# Patient Record
Sex: FEMALE | Race: WHITE | Employment: OTHER | URBAN - METROPOLITAN AREA
[De-identification: names, ages, dates, MRNs, and addresses within clinical notes are randomized per-mention and may not be internally consistent; named-entity substitution may affect disease eponyms.]

---

## 2017-02-28 PROBLEM — J10.00 PNEUMONIA OF BOTH LUNGS DUE TO INFLUENZA A VIRUS: Status: ACTIVE | Noted: 2017-02-28

## 2017-06-15 ENCOUNTER — HOSPITAL ENCOUNTER (OUTPATIENT)
Dept: GENERAL RADIOLOGY | Age: 52
Discharge: HOME | End: 2017-06-15
Attending: FAMILY MEDICINE | Admitting: FAMILY MEDICINE

## 2017-06-15 DIAGNOSIS — M79.632 LEFT FOREARM PAIN: ICD-10-CM

## 2017-10-05 PROBLEM — J43.2 CENTRILOBULAR EMPHYSEMA (HCC): Status: ACTIVE | Noted: 2017-10-05

## 2018-03-16 ENCOUNTER — HOSPITAL ENCOUNTER (OUTPATIENT)
Dept: GENERAL RADIOLOGY | Age: 53
Discharge: HOME | End: 2018-03-16
Attending: FAMILY MEDICINE | Admitting: FAMILY MEDICINE

## 2018-03-16 DIAGNOSIS — M19.90 ACUTE ARTHRITIS: ICD-10-CM

## 2018-03-16 DIAGNOSIS — M54.5 LOW BACK PAIN, UNSPECIFIED BACK PAIN LATERALITY, UNSPECIFIED CHRONICITY, WITH SCIATICA PRESENCE UNSPECIFIED: ICD-10-CM

## 2018-03-16 DIAGNOSIS — R05.9 COUGH: ICD-10-CM

## 2018-03-16 LAB
ALBUMIN SERPL-MCNC: 4.6 GM/DL (ref 3.4–5)
ALP BLD-CCNC: 91 IU/L (ref 40–128)
ALT SERPL-CCNC: 9 U/L (ref 10–40)
ANION GAP SERPL CALCULATED.3IONS-SCNC: 12 MMOL/L (ref 4–16)
AST SERPL-CCNC: 15 IU/L (ref 15–37)
BILIRUB SERPL-MCNC: 0.5 MG/DL (ref 0–1)
BUN BLDV-MCNC: 13 MG/DL (ref 6–23)
CALCIUM SERPL-MCNC: 9.8 MG/DL (ref 8.3–10.6)
CHLORIDE BLD-SCNC: 101 MMOL/L (ref 99–110)
CHOLESTEROL: 259 MG/DL
CO2: 31 MMOL/L (ref 21–32)
CREAT SERPL-MCNC: 0.8 MG/DL (ref 0.6–1.1)
ERYTHROCYTE SEDIMENTATION RATE: 3 MM/HR (ref 0–30)
ESTIMATED AVERAGE GLUCOSE: 111 MG/DL
GFR AFRICAN AMERICAN: >60 ML/MIN/1.73M2
GFR NON-AFRICAN AMERICAN: >60 ML/MIN/1.73M2
GLUCOSE BLD-MCNC: 93 MG/DL (ref 70–99)
HBA1C MFR BLD: 5.5 % (ref 4.2–6.3)
HCT VFR BLD CALC: 53.5 % (ref 37–47)
HDLC SERPL-MCNC: 62 MG/DL
HEMOGLOBIN: 17.5 GM/DL (ref 12.5–16)
LDL CHOLESTEROL CALCULATED: 181 MG/DL
MCH RBC QN AUTO: 30.7 PG (ref 27–31)
MCHC RBC AUTO-ENTMCNC: 32.7 % (ref 32–36)
MCV RBC AUTO: 93.9 FL (ref 78–100)
PDW BLD-RTO: 12.6 % (ref 11.7–14.9)
PLATELET # BLD: 203 K/CU MM (ref 140–440)
PMV BLD AUTO: 12 FL (ref 7.5–11.1)
POTASSIUM SERPL-SCNC: 4.4 MMOL/L (ref 3.5–5.1)
RBC # BLD: 5.7 M/CU MM (ref 4.2–5.4)
SODIUM BLD-SCNC: 144 MMOL/L (ref 135–145)
T4 FREE: 1.42 NG/DL (ref 0.9–1.8)
TOTAL PROTEIN: 7 GM/DL (ref 6.4–8.2)
TRIGL SERPL-MCNC: 79 MG/DL
TSH HIGH SENSITIVITY: 2.56 UIU/ML (ref 0.27–4.2)
URIC ACID: 4 MG/DL (ref 2.6–6)
VITAMIN D 25-HYDROXY: 23.7 NG/ML
WBC # BLD: 6.1 K/CU MM (ref 4–10.5)

## 2018-03-26 ENCOUNTER — HOSPITAL ENCOUNTER (OUTPATIENT)
Dept: WOMENS IMAGING | Age: 53
Discharge: HOME | End: 2018-03-26
Attending: FAMILY MEDICINE | Admitting: FAMILY MEDICINE

## 2018-03-26 DIAGNOSIS — Z12.31 ENCOUNTER FOR SCREENING MAMMOGRAM FOR MALIGNANT NEOPLASM OF BREAST: ICD-10-CM

## 2018-03-26 DIAGNOSIS — M19.011 PRIMARY OSTEOARTHRITIS OF RIGHT SHOULDER: ICD-10-CM

## 2018-03-26 DIAGNOSIS — Z82.62 FAMILY HISTORY OF OSTEOPOROSIS: ICD-10-CM

## 2018-03-26 DIAGNOSIS — Z12.31 VISIT FOR SCREENING MAMMOGRAM: ICD-10-CM

## 2018-03-26 DIAGNOSIS — M54.50 LOW BACK PAIN: ICD-10-CM

## 2018-03-27 ENCOUNTER — HOSPITAL ENCOUNTER (OUTPATIENT)
Dept: GENERAL RADIOLOGY | Age: 53
Discharge: HOME | End: 2018-03-27
Admitting: FAMILY MEDICINE

## 2018-03-27 DIAGNOSIS — R05.9 COUGH: ICD-10-CM

## 2018-03-27 DIAGNOSIS — M54.5 LOW BACK PAIN, UNSPECIFIED BACK PAIN LATERALITY, UNSPECIFIED CHRONICITY, WITH SCIATICA PRESENCE UNSPECIFIED: ICD-10-CM

## 2018-03-27 DIAGNOSIS — I73.9 VASCULAR DISEASE, PERIPHERAL (HCC): ICD-10-CM

## 2018-03-27 DIAGNOSIS — M19.90 ARTHRITIS: ICD-10-CM

## 2018-03-28 ENCOUNTER — HOSPITAL ENCOUNTER (OUTPATIENT)
Dept: WOMENS IMAGING | Age: 53
Discharge: HOME | End: 2018-03-28
Attending: FAMILY MEDICINE | Admitting: FAMILY MEDICINE

## 2018-03-28 DIAGNOSIS — N64.89 BREAST ASYMMETRY: ICD-10-CM

## 2018-04-24 ENCOUNTER — HOSPITAL ENCOUNTER (OUTPATIENT)
Dept: CARDIOLOGY | Age: 53
Discharge: HOME | End: 2018-04-24
Attending: FAMILY MEDICINE | Admitting: FAMILY MEDICINE

## 2018-04-24 DIAGNOSIS — I73.9 PERIPHERAL VASCULAR DISEASE (HCC): ICD-10-CM

## 2018-04-24 LAB
LV EF: 53 %
LVEF MODALITY: NORMAL

## 2018-04-26 ENCOUNTER — HOSPITAL ENCOUNTER (OUTPATIENT)
Dept: CT IMAGING | Age: 53
Discharge: HOME | End: 2018-04-26
Attending: FAMILY MEDICINE | Admitting: FAMILY MEDICINE

## 2018-04-26 DIAGNOSIS — I65.21 OCCLUSION OF RIGHT CAROTID ARTERY: ICD-10-CM

## 2018-04-26 DIAGNOSIS — I73.9 PERIPHERAL VASCULAR DISEASE (HCC): ICD-10-CM

## 2018-04-26 DIAGNOSIS — I73.9 INTERMITTENT CLAUDICATION (HCC): ICD-10-CM

## 2018-04-26 RX ORDER — 0.9 % SODIUM CHLORIDE 0.9 %
10 VIAL (ML) INJECTION
Status: COMPLETED | OUTPATIENT
Start: 2018-04-26 | End: 2018-04-26

## 2018-04-26 RX ADMIN — Medication 10 ML: at 09:07

## 2018-04-30 ENCOUNTER — HOSPITAL ENCOUNTER (OUTPATIENT)
Dept: WOMENS IMAGING | Age: 53
Discharge: HOME | End: 2018-04-30
Attending: FAMILY MEDICINE | Admitting: FAMILY MEDICINE

## 2018-04-30 DIAGNOSIS — M54.50 LOW BACK PAIN: ICD-10-CM

## 2018-04-30 DIAGNOSIS — Z12.31 ENCOUNTER FOR SCREENING MAMMOGRAM FOR MALIGNANT NEOPLASM OF BREAST: ICD-10-CM

## 2018-04-30 DIAGNOSIS — M19.90 SENILE ARTHRITIS: ICD-10-CM

## 2018-04-30 DIAGNOSIS — Z82.62 FAM HX-OSTEOPOROSIS: ICD-10-CM

## 2018-04-30 DIAGNOSIS — Z82.62 FAMILY HISTORY OF OSTEOPOROSIS: ICD-10-CM

## 2018-04-30 DIAGNOSIS — M19.011 PRIMARY OSTEOARTHRITIS OF RIGHT SHOULDER: ICD-10-CM

## 2018-05-14 ENCOUNTER — HOSPITAL ENCOUNTER (OUTPATIENT)
Dept: CARDIOLOGY | Age: 53
Discharge: HOME | End: 2018-05-14
Attending: FAMILY MEDICINE | Admitting: FAMILY MEDICINE

## 2018-05-14 DIAGNOSIS — Z82.41 FAMILY HISTORY OF SUDDEN CARDIAC DEATH (SCD): ICD-10-CM

## 2018-05-14 LAB
LV EF: 60 %
LVEF MODALITY: NORMAL

## 2018-05-14 RX ORDER — SODIUM CHLORIDE 0.9 % (FLUSH) 0.9 %
10 SYRINGE (ML) INJECTION 2 TIMES DAILY
Status: DISCONTINUED | OUTPATIENT
Start: 2018-05-14 | End: 2018-05-15 | Stop reason: HOSPADM

## 2018-05-14 RX ADMIN — Medication 30 MILLICURIE: at 10:58

## 2018-05-14 RX ADMIN — Medication 10 MILLICURIE: at 10:57

## 2018-05-14 RX ADMIN — Medication 10 ML: at 09:52

## 2018-05-15 PROBLEM — I65.23 BILATERAL CAROTID ARTERY STENOSIS: Status: ACTIVE | Noted: 2018-05-15

## 2018-06-15 ENCOUNTER — HOSPITAL ENCOUNTER (OUTPATIENT)
Dept: ULTRASOUND IMAGING | Age: 53
Discharge: HOME | End: 2018-06-15
Attending: FAMILY MEDICINE | Admitting: FAMILY MEDICINE

## 2018-06-15 DIAGNOSIS — I70.213 ATHEROSCLEROSIS OF NATIVE ARTERY OF BOTH LOWER EXTREMITIES WITH INTERMITTENT CLAUDICATION (HCC): ICD-10-CM

## 2018-06-15 DIAGNOSIS — I73.9 INTERMITTENT CLAUDICATION (HCC): ICD-10-CM

## 2018-07-11 PROBLEM — J10.00 PNEUMONIA OF BOTH LUNGS DUE TO INFLUENZA A VIRUS: Status: RESOLVED | Noted: 2017-02-28 | Resolved: 2018-07-11

## 2018-07-26 ENCOUNTER — HOSPITAL ENCOUNTER (OUTPATIENT)
Dept: GENERAL RADIOLOGY | Age: 53
Discharge: HOME | End: 2018-07-26
Attending: FAMILY MEDICINE | Admitting: FAMILY MEDICINE

## 2018-07-26 LAB
FERRITIN: 89 NG/ML (ref 15–150)
FOLATE: 6.4 NG/ML (ref 3.1–17.5)
IRON: 87 UG/DL (ref 37–145)
MAGNESIUM: 2.3 MG/DL (ref 1.8–2.4)
PCT TRANSFERRIN: 36 % (ref 10–44)
TOTAL IRON BINDING CAPACITY: 245 UG/DL (ref 250–450)
UNSATURATED IRON BINDING CAPACITY: 158 UG/DL (ref 110–370)
VITAMIN B-12: 407.4 PG/ML (ref 211–911)

## 2019-03-26 ENCOUNTER — HOSPITAL ENCOUNTER (OUTPATIENT)
Age: 54
Discharge: HOME | End: 2019-03-26
Payer: COMMERCIAL

## 2019-03-26 LAB
ALBUMIN SERPL-MCNC: 4.1 GM/DL (ref 3.4–5)
ALBUMIN SERPL-MCNC: 4.1 GM/DL (ref 3.4–5)
ALP BLD-CCNC: 93 IU/L (ref 40–129)
ALP BLD-CCNC: 93 IU/L (ref 40–129)
ALT SERPL-CCNC: 9 U/L (ref 10–40)
ALT SERPL-CCNC: 9 U/L (ref 10–40)
ANION GAP SERPL CALCULATED.3IONS-SCNC: 10 MMOL/L (ref 4–16)
AST SERPL-CCNC: 13 IU/L (ref 15–37)
AST SERPL-CCNC: 13 IU/L (ref 15–37)
BACTERIA: ABNORMAL /HPF
BILIRUB SERPL-MCNC: 0.3 MG/DL (ref 0–1)
BILIRUB SERPL-MCNC: 0.3 MG/DL (ref 0–1)
BILIRUBIN DIRECT: 0.2 MG/DL (ref 0–0.3)
BILIRUBIN URINE: NEGATIVE MG/DL
BILIRUBIN, INDIRECT: 0.1 MG/DL (ref 0–0.7)
BLOOD, URINE: NEGATIVE
BUN BLDV-MCNC: 9 MG/DL (ref 6–23)
CALCIUM SERPL-MCNC: 8.9 MG/DL (ref 8.3–10.6)
CHLORIDE BLD-SCNC: 97 MMOL/L (ref 99–110)
CHOLESTEROL: 198 MG/DL
CLARITY: CLEAR
CO2: 30 MMOL/L (ref 21–32)
COLOR: YELLOW
CREAT SERPL-MCNC: 0.7 MG/DL (ref 0.6–1.1)
ERYTHROCYTE SEDIMENTATION RATE: 4 MM/HR (ref 0–30)
ESTIMATED AVERAGE GLUCOSE: 111 MG/DL
FOLATE: 5 NG/ML (ref 3.1–17.5)
GFR AFRICAN AMERICAN: >60 ML/MIN/1.73M2
GFR NON-AFRICAN AMERICAN: >60 ML/MIN/1.73M2
GLUCOSE BLD-MCNC: 71 MG/DL (ref 70–99)
GLUCOSE, URINE: NEGATIVE MG/DL
HBA1C MFR BLD: 5.5 % (ref 4.2–6.3)
HDLC SERPL-MCNC: 54 MG/DL
HYALINE CASTS: 0 /LPF
KETONES, URINE: NEGATIVE MG/DL
LDL CHOLESTEROL DIRECT: 147 MG/DL
LEUKOCYTE ESTERASE, URINE: ABNORMAL
NITRITE URINE, QUANTITATIVE: NEGATIVE
PH, URINE: 6 (ref 5–8)
POTASSIUM SERPL-SCNC: 4.2 MMOL/L (ref 3.5–5.1)
PROTEIN UA: NEGATIVE MG/DL
RBC URINE: ABNORMAL /HPF (ref 0–6)
SODIUM BLD-SCNC: 137 MMOL/L (ref 135–145)
SPECIFIC GRAVITY UA: 1.01 (ref 1–1.03)
SQUAMOUS EPITHELIAL: 3 /HPF
T4 FREE: 1.36 NG/DL (ref 0.9–1.8)
TOTAL PROTEIN: 6.3 GM/DL (ref 6.4–8.2)
TOTAL PROTEIN: 6.3 GM/DL (ref 6.4–8.2)
TRANSITIONAL EPITHELIAL: <1 /HPF
TRICHOMONAS: ABNORMAL /HPF
TRIGL SERPL-MCNC: 67 MG/DL
TSH HIGH SENSITIVITY: 1.85 UIU/ML (ref 0.27–4.2)
URIC ACID: 3.7 MG/DL (ref 2.6–6)
UROBILINOGEN, URINE: NORMAL MG/DL (ref 0.2–1)
VITAMIN B-12: 300.2 PG/ML (ref 211–911)
VITAMIN D 25-HYDROXY: 21.02 NG/ML
WBC UA: 5 /HPF (ref 0–5)

## 2019-03-26 PROCEDURE — 82248 BILIRUBIN DIRECT: CPT

## 2019-03-26 PROCEDURE — 81001 URINALYSIS AUTO W/SCOPE: CPT

## 2019-03-26 PROCEDURE — 82306 VITAMIN D 25 HYDROXY: CPT

## 2019-03-26 PROCEDURE — 84439 ASSAY OF FREE THYROXINE: CPT

## 2019-03-26 PROCEDURE — 36415 COLL VENOUS BLD VENIPUNCTURE: CPT

## 2019-03-26 PROCEDURE — 85652 RBC SED RATE AUTOMATED: CPT

## 2019-03-26 PROCEDURE — 80053 COMPREHEN METABOLIC PANEL: CPT

## 2019-03-26 PROCEDURE — 80061 LIPID PANEL: CPT

## 2019-03-26 PROCEDURE — 86430 RHEUMATOID FACTOR TEST QUAL: CPT

## 2019-03-26 PROCEDURE — 83036 HEMOGLOBIN GLYCOSYLATED A1C: CPT

## 2019-03-26 PROCEDURE — 84443 ASSAY THYROID STIM HORMONE: CPT

## 2019-03-26 PROCEDURE — 83721 ASSAY OF BLOOD LIPOPROTEIN: CPT

## 2019-03-26 PROCEDURE — 84550 ASSAY OF BLOOD/URIC ACID: CPT

## 2019-03-26 PROCEDURE — 82746 ASSAY OF FOLIC ACID SERUM: CPT

## 2019-03-26 PROCEDURE — 86038 ANTINUCLEAR ANTIBODIES: CPT

## 2019-03-26 PROCEDURE — 82607 VITAMIN B-12: CPT

## 2019-03-29 LAB
ANTI-NUCLEAR ANTIBODY (ANA): NORMAL
ANTI-NUCLEAR ANTIBODY (ANA): NORMAL
RHEUMATOID FACTOR: <10 IU/ML (ref 0–14)
RHEUMATOID FACTOR: NORMAL IU/ML (ref 0–14)

## 2019-07-02 ENCOUNTER — HOSPITAL ENCOUNTER (OUTPATIENT)
Age: 54
Discharge: HOME | End: 2019-07-02
Payer: COMMERCIAL

## 2019-07-02 ENCOUNTER — HOSPITAL ENCOUNTER (OUTPATIENT)
Dept: GENERAL RADIOLOGY | Age: 54
Discharge: HOME | End: 2019-07-02
Payer: COMMERCIAL

## 2019-07-02 DIAGNOSIS — J43.2 CENTRILOBULAR EMPHYSEMA (HCC): ICD-10-CM

## 2019-07-02 PROCEDURE — 71046 X-RAY EXAM CHEST 2 VIEWS: CPT

## 2021-12-28 ENCOUNTER — APPOINTMENT (OUTPATIENT)
Dept: GENERAL RADIOLOGY | Age: 56
DRG: 194 | End: 2021-12-28
Payer: COMMERCIAL

## 2021-12-28 ENCOUNTER — HOSPITAL ENCOUNTER (INPATIENT)
Age: 56
LOS: 5 days | Discharge: HOME | DRG: 194 | End: 2022-01-03
Attending: FAMILY MEDICINE | Admitting: FAMILY MEDICINE
Payer: COMMERCIAL

## 2021-12-28 DIAGNOSIS — R60.0 PERIORBITAL EDEMA: ICD-10-CM

## 2021-12-28 DIAGNOSIS — R09.02 HYPOXIA: ICD-10-CM

## 2021-12-28 DIAGNOSIS — R79.89 ELEVATED BRAIN NATRIURETIC PEPTIDE (BNP) LEVEL: ICD-10-CM

## 2021-12-28 DIAGNOSIS — M79.89 LEG SWELLING: ICD-10-CM

## 2021-12-28 DIAGNOSIS — J18.9 PNEUMONIA DUE TO INFECTIOUS ORGANISM, UNSPECIFIED LATERALITY, UNSPECIFIED PART OF LUNG: Primary | ICD-10-CM

## 2021-12-28 LAB
ALBUMIN SERPL-MCNC: 3.3 GM/DL (ref 3.4–5)
ALP BLD-CCNC: 91 IU/L (ref 40–129)
ALT SERPL-CCNC: 11 U/L (ref 10–40)
ANION GAP SERPL CALCULATED.3IONS-SCNC: 5 MMOL/L (ref 4–16)
AST SERPL-CCNC: 14 IU/L (ref 15–37)
BASE EXCESS MIXED: 3.2 (ref 0–2.3)
BASOPHILS ABSOLUTE: 0 K/CU MM
BASOPHILS RELATIVE PERCENT: 0.5 % (ref 0–1)
BILIRUB SERPL-MCNC: 0.5 MG/DL (ref 0–1)
BUN BLDV-MCNC: 24 MG/DL (ref 6–23)
CALCIUM SERPL-MCNC: 8.6 MG/DL (ref 8.3–10.6)
CHLORIDE BLD-SCNC: 99 MMOL/L (ref 99–110)
CO2: 31 MMOL/L (ref 21–32)
COMMENT: ABNORMAL
CREAT SERPL-MCNC: 0.7 MG/DL (ref 0.6–1.1)
DIFFERENTIAL TYPE: ABNORMAL
EOSINOPHILS ABSOLUTE: 0 K/CU MM
EOSINOPHILS RELATIVE PERCENT: 0.5 % (ref 0–3)
GFR AFRICAN AMERICAN: >60 ML/MIN/1.73M2
GFR NON-AFRICAN AMERICAN: >60 ML/MIN/1.73M2
GLUCOSE BLD-MCNC: 112 MG/DL (ref 70–99)
HCO3 VENOUS: 31.5 MMOL/L (ref 19–25)
HCT VFR BLD CALC: 55.9 % (ref 37–47)
HEMOGLOBIN: 16.6 GM/DL (ref 12.5–16)
IMMATURE NEUTROPHIL %: 0.2 % (ref 0–0.43)
LACTATE: 1 MMOL/L (ref 0.4–2)
LYMPHOCYTES ABSOLUTE: 1 K/CU MM
LYMPHOCYTES RELATIVE PERCENT: 16.7 % (ref 24–44)
MCH RBC QN AUTO: 26.6 PG (ref 27–31)
MCHC RBC AUTO-ENTMCNC: 29.7 % (ref 32–36)
MCV RBC AUTO: 89.6 FL (ref 78–100)
MONOCYTES ABSOLUTE: 0.9 K/CU MM
MONOCYTES RELATIVE PERCENT: 15.8 % (ref 0–4)
NUCLEATED RBC %: 0.9 %
O2 SAT, VEN: 85.6 % (ref 50–70)
PCO2, VEN: 64 MMHG (ref 38–52)
PDW BLD-RTO: 14.8 % (ref 11.7–14.9)
PH VENOUS: 7.3 (ref 7.32–7.42)
PLATELET # BLD: 186 K/CU MM (ref 140–440)
PMV BLD AUTO: 10.7 FL (ref 7.5–11.1)
PO2, VEN: 69 MMHG (ref 28–48)
POTASSIUM SERPL-SCNC: 4.5 MMOL/L (ref 3.5–5.1)
PRO-BNP: 3098 PG/ML
RBC # BLD: 6.24 M/CU MM (ref 4.2–5.4)
SARS-COV-2, NAAT: NOT DETECTED
SEGMENTED NEUTROPHILS ABSOLUTE COUNT: 3.9 K/CU MM
SEGMENTED NEUTROPHILS RELATIVE PERCENT: 66.3 % (ref 36–66)
SODIUM BLD-SCNC: 135 MMOL/L (ref 135–145)
SOURCE: NORMAL
TOTAL IMMATURE NEUTOROPHIL: 0.01 K/CU MM
TOTAL NUCLEATED RBC: 0.1 K/CU MM
TOTAL PROTEIN: 6 GM/DL (ref 6.4–8.2)
TROPONIN T: 0.01 NG/ML
WBC # BLD: 5.9 K/CU MM (ref 4–10.5)

## 2021-12-28 PROCEDURE — 93005 ELECTROCARDIOGRAM TRACING: CPT | Performed by: PHYSICIAN ASSISTANT

## 2021-12-28 PROCEDURE — 99285 EMERGENCY DEPT VISIT HI MDM: CPT

## 2021-12-28 PROCEDURE — 6370000000 HC RX 637 (ALT 250 FOR IP): Performed by: PHYSICIAN ASSISTANT

## 2021-12-28 PROCEDURE — 84484 ASSAY OF TROPONIN QUANT: CPT

## 2021-12-28 PROCEDURE — 87635 SARS-COV-2 COVID-19 AMP PRB: CPT

## 2021-12-28 PROCEDURE — 83605 ASSAY OF LACTIC ACID: CPT

## 2021-12-28 PROCEDURE — 80053 COMPREHEN METABOLIC PANEL: CPT

## 2021-12-28 PROCEDURE — 94640 AIRWAY INHALATION TREATMENT: CPT

## 2021-12-28 PROCEDURE — 6360000002 HC RX W HCPCS: Performed by: PHYSICIAN ASSISTANT

## 2021-12-28 PROCEDURE — 2580000003 HC RX 258: Performed by: PHYSICIAN ASSISTANT

## 2021-12-28 PROCEDURE — 96365 THER/PROPH/DIAG IV INF INIT: CPT

## 2021-12-28 PROCEDURE — 96375 TX/PRO/DX INJ NEW DRUG ADDON: CPT

## 2021-12-28 PROCEDURE — 87040 BLOOD CULTURE FOR BACTERIA: CPT

## 2021-12-28 PROCEDURE — 87150 DNA/RNA AMPLIFIED PROBE: CPT

## 2021-12-28 PROCEDURE — 82805 BLOOD GASES W/O2 SATURATION: CPT

## 2021-12-28 PROCEDURE — 84145 PROCALCITONIN (PCT): CPT

## 2021-12-28 PROCEDURE — 85025 COMPLETE CBC W/AUTO DIFF WBC: CPT

## 2021-12-28 PROCEDURE — 71045 X-RAY EXAM CHEST 1 VIEW: CPT

## 2021-12-28 PROCEDURE — 83880 ASSAY OF NATRIURETIC PEPTIDE: CPT

## 2021-12-28 PROCEDURE — 0202U NFCT DS 22 TRGT SARS-COV-2: CPT

## 2021-12-28 RX ORDER — ALBUTEROL SULFATE 90 UG/1
6 AEROSOL, METERED RESPIRATORY (INHALATION) ONCE
Status: COMPLETED | OUTPATIENT
Start: 2021-12-28 | End: 2021-12-28

## 2021-12-28 RX ORDER — METHYLPREDNISOLONE SODIUM SUCCINATE 125 MG/2ML
125 INJECTION, POWDER, LYOPHILIZED, FOR SOLUTION INTRAMUSCULAR; INTRAVENOUS ONCE
Status: COMPLETED | OUTPATIENT
Start: 2021-12-28 | End: 2021-12-28

## 2021-12-28 RX ORDER — FUROSEMIDE 10 MG/ML
40 INJECTION INTRAMUSCULAR; INTRAVENOUS ONCE
Status: COMPLETED | OUTPATIENT
Start: 2021-12-28 | End: 2021-12-28

## 2021-12-28 RX ORDER — IPRATROPIUM BROMIDE AND ALBUTEROL SULFATE 2.5; .5 MG/3ML; MG/3ML
1 SOLUTION RESPIRATORY (INHALATION)
Status: DISCONTINUED | OUTPATIENT
Start: 2021-12-29 | End: 2021-12-28

## 2021-12-28 RX ADMIN — METHYLPREDNISOLONE SODIUM SUCCINATE 125 MG: 125 INJECTION, POWDER, FOR SOLUTION INTRAMUSCULAR; INTRAVENOUS at 21:31

## 2021-12-28 RX ADMIN — ALBUTEROL SULFATE 6 PUFF: 90 AEROSOL, METERED RESPIRATORY (INHALATION) at 20:30

## 2021-12-28 RX ADMIN — Medication 2 PUFF: at 20:35

## 2021-12-28 RX ADMIN — FUROSEMIDE 40 MG: 10 INJECTION, SOLUTION INTRAMUSCULAR; INTRAVENOUS at 21:31

## 2021-12-28 RX ADMIN — CEFTRIAXONE SODIUM 1000 MG: 1 INJECTION, POWDER, FOR SOLUTION INTRAMUSCULAR; INTRAVENOUS at 22:45

## 2021-12-29 ENCOUNTER — APPOINTMENT (OUTPATIENT)
Dept: ULTRASOUND IMAGING | Age: 56
DRG: 194 | End: 2021-12-29
Payer: COMMERCIAL

## 2021-12-29 ENCOUNTER — APPOINTMENT (OUTPATIENT)
Dept: NUCLEAR MEDICINE | Age: 56
DRG: 194 | End: 2021-12-29
Payer: COMMERCIAL

## 2021-12-29 ENCOUNTER — APPOINTMENT (OUTPATIENT)
Dept: CT IMAGING | Age: 56
DRG: 194 | End: 2021-12-29
Payer: COMMERCIAL

## 2021-12-29 PROBLEM — J18.9 PNEUMONIA: Status: ACTIVE | Noted: 2021-12-29

## 2021-12-29 LAB
ADENOVIRUS DETECTION BY PCR: NOT DETECTED
BORDETELLA PARAPERTUSSIS BY PCR: NOT DETECTED
BORDETELLA PERTUSSIS PCR: NOT DETECTED
CHLAMYDOPHILA PNEUMONIA PCR: NOT DETECTED
CORONAVIRUS 229E PCR: NOT DETECTED
CORONAVIRUS HKU1 PCR: NOT DETECTED
CORONAVIRUS NL63 PCR: NOT DETECTED
CORONAVIRUS OC43 PCR: NOT DETECTED
EKG ATRIAL RATE: 114 BPM
EKG DIAGNOSIS: NORMAL
EKG P AXIS: 116 DEGREES
EKG P-R INTERVAL: 126 MS
EKG Q-T INTERVAL: 346 MS
EKG QRS DURATION: 74 MS
EKG QTC CALCULATION (BAZETT): 476 MS
EKG R AXIS: 151 DEGREES
EKG T AXIS: 58 DEGREES
EKG VENTRICULAR RATE: 114 BPM
HUMAN METAPNEUMOVIRUS PCR: NOT DETECTED
INFLUENZA A BY PCR: NOT DETECTED
INFLUENZA A H1 (2009) PCR: NOT DETECTED
INFLUENZA A H1 PANDEMIC PCR: NOT DETECTED
INFLUENZA A H3 PCR: NOT DETECTED
INFLUENZA B BY PCR: NOT DETECTED
LV EF: 55 %
LV EF: 55 %
LVEF MODALITY: NORMAL
LVEF MODALITY: NORMAL
MYCOPLASMA PNEUMONIAE PCR: NOT DETECTED
PARAINFLUENZA 1 PCR: NOT DETECTED
PARAINFLUENZA 2 PCR: NOT DETECTED
PARAINFLUENZA 3 PCR: NOT DETECTED
PARAINFLUENZA 4 PCR: NOT DETECTED
PROCALCITONIN: 0.04
RHINOVIRUS ENTEROVIRUS PCR: NOT DETECTED
RSV PCR: NOT DETECTED
SARS-COV-2: NOT DETECTED

## 2021-12-29 PROCEDURE — 99221 1ST HOSP IP/OBS SF/LOW 40: CPT | Performed by: INTERNAL MEDICINE

## 2021-12-29 PROCEDURE — 96367 TX/PROPH/DG ADDL SEQ IV INF: CPT

## 2021-12-29 PROCEDURE — 93970 EXTREMITY STUDY: CPT

## 2021-12-29 PROCEDURE — 6360000002 HC RX W HCPCS

## 2021-12-29 PROCEDURE — 6360000002 HC RX W HCPCS: Performed by: INTERNAL MEDICINE

## 2021-12-29 PROCEDURE — A9500 TC99M SESTAMIBI: HCPCS | Performed by: INTERNAL MEDICINE

## 2021-12-29 PROCEDURE — 94761 N-INVAS EAR/PLS OXIMETRY MLT: CPT

## 2021-12-29 PROCEDURE — 3430000000 HC RX DIAGNOSTIC RADIOPHARMACEUTICAL: Performed by: INTERNAL MEDICINE

## 2021-12-29 PROCEDURE — 6360000002 HC RX W HCPCS: Performed by: PHYSICIAN ASSISTANT

## 2021-12-29 PROCEDURE — 93306 TTE W/DOPPLER COMPLETE: CPT

## 2021-12-29 PROCEDURE — 6360000004 HC RX CONTRAST MEDICATION: Performed by: INTERNAL MEDICINE

## 2021-12-29 PROCEDURE — 6370000000 HC RX 637 (ALT 250 FOR IP): Performed by: FAMILY MEDICINE

## 2021-12-29 PROCEDURE — 78452 HT MUSCLE IMAGE SPECT MULT: CPT

## 2021-12-29 PROCEDURE — 2700000000 HC OXYGEN THERAPY PER DAY

## 2021-12-29 PROCEDURE — 2580000003 HC RX 258: Performed by: PHYSICIAN ASSISTANT

## 2021-12-29 PROCEDURE — 2580000003 HC RX 258: Performed by: FAMILY MEDICINE

## 2021-12-29 PROCEDURE — 93017 CV STRESS TEST TRACING ONLY: CPT

## 2021-12-29 PROCEDURE — 93010 ELECTROCARDIOGRAM REPORT: CPT | Performed by: INTERNAL MEDICINE

## 2021-12-29 PROCEDURE — 71275 CT ANGIOGRAPHY CHEST: CPT

## 2021-12-29 PROCEDURE — 1200000000 HC SEMI PRIVATE

## 2021-12-29 RX ORDER — SODIUM CHLORIDE 9 MG/ML
25 INJECTION, SOLUTION INTRAVENOUS PRN
Status: DISCONTINUED | OUTPATIENT
Start: 2021-12-29 | End: 2022-01-03 | Stop reason: HOSPADM

## 2021-12-29 RX ORDER — ONDANSETRON 2 MG/ML
4 INJECTION INTRAMUSCULAR; INTRAVENOUS EVERY 6 HOURS PRN
Status: DISCONTINUED | OUTPATIENT
Start: 2021-12-29 | End: 2022-01-03 | Stop reason: HOSPADM

## 2021-12-29 RX ORDER — SODIUM CHLORIDE 0.9 % (FLUSH) 0.9 %
5-40 SYRINGE (ML) INJECTION EVERY 12 HOURS SCHEDULED
Status: DISCONTINUED | OUTPATIENT
Start: 2021-12-29 | End: 2022-01-03 | Stop reason: HOSPADM

## 2021-12-29 RX ORDER — AMINOPHYLLINE DIHYDRATE 25 MG/ML
75 INJECTION, SOLUTION INTRAVENOUS ONCE
Status: COMPLETED | OUTPATIENT
Start: 2021-12-29 | End: 2021-12-29

## 2021-12-29 RX ORDER — SODIUM CHLORIDE 0.9 % (FLUSH) 0.9 %
5-40 SYRINGE (ML) INJECTION PRN
Status: DISCONTINUED | OUTPATIENT
Start: 2021-12-29 | End: 2022-01-03 | Stop reason: HOSPADM

## 2021-12-29 RX ORDER — BENZONATATE 100 MG/1
200 CAPSULE ORAL 3 TIMES DAILY PRN
Status: DISCONTINUED | OUTPATIENT
Start: 2021-12-29 | End: 2022-01-03 | Stop reason: HOSPADM

## 2021-12-29 RX ORDER — FUROSEMIDE 10 MG/ML
20 INJECTION INTRAMUSCULAR; INTRAVENOUS 2 TIMES DAILY
Status: DISCONTINUED | OUTPATIENT
Start: 2021-12-29 | End: 2022-01-03 | Stop reason: HOSPADM

## 2021-12-29 RX ORDER — SODIUM PHOSPHATE, DIBASIC AND SODIUM PHOSPHATE, MONOBASIC 7; 19 G/133ML; G/133ML
1 ENEMA RECTAL DAILY PRN
Status: DISCONTINUED | OUTPATIENT
Start: 2021-12-29 | End: 2022-01-03 | Stop reason: HOSPADM

## 2021-12-29 RX ORDER — GUAIFENESIN 600 MG/1
1200 TABLET, EXTENDED RELEASE ORAL 2 TIMES DAILY
Status: DISCONTINUED | OUTPATIENT
Start: 2021-12-29 | End: 2022-01-03 | Stop reason: HOSPADM

## 2021-12-29 RX ORDER — POLYETHYLENE GLYCOL 3350 17 G/17G
17 POWDER, FOR SOLUTION ORAL DAILY PRN
Status: DISCONTINUED | OUTPATIENT
Start: 2021-12-29 | End: 2022-01-03 | Stop reason: HOSPADM

## 2021-12-29 RX ORDER — LACTULOSE 10 G/15ML
20 SOLUTION ORAL 2 TIMES DAILY PRN
Status: DISCONTINUED | OUTPATIENT
Start: 2021-12-29 | End: 2022-01-03 | Stop reason: HOSPADM

## 2021-12-29 RX ORDER — SODIUM CHLORIDE 0.9 % (FLUSH) 0.9 %
5-40 SYRINGE (ML) INJECTION EVERY 12 HOURS SCHEDULED
Status: DISCONTINUED | OUTPATIENT
Start: 2021-12-29 | End: 2022-01-02 | Stop reason: SDUPTHER

## 2021-12-29 RX ORDER — MAGNESIUM SULFATE IN WATER 40 MG/ML
2000 INJECTION, SOLUTION INTRAVENOUS PRN
Status: DISCONTINUED | OUTPATIENT
Start: 2021-12-29 | End: 2022-01-03 | Stop reason: HOSPADM

## 2021-12-29 RX ORDER — MAGNESIUM OXIDE 400 MG/1
400 TABLET ORAL 2 TIMES DAILY
Status: DISCONTINUED | OUTPATIENT
Start: 2021-12-29 | End: 2022-01-03 | Stop reason: HOSPADM

## 2021-12-29 RX ORDER — ACETAMINOPHEN 325 MG/1
650 TABLET ORAL EVERY 6 HOURS PRN
Status: DISCONTINUED | OUTPATIENT
Start: 2021-12-29 | End: 2022-01-03 | Stop reason: HOSPADM

## 2021-12-29 RX ORDER — POTASSIUM CHLORIDE 20 MEQ/1
40 TABLET, EXTENDED RELEASE ORAL PRN
Status: DISCONTINUED | OUTPATIENT
Start: 2021-12-29 | End: 2022-01-03 | Stop reason: HOSPADM

## 2021-12-29 RX ORDER — POTASSIUM CHLORIDE 7.45 MG/ML
10 INJECTION INTRAVENOUS PRN
Status: DISCONTINUED | OUTPATIENT
Start: 2021-12-29 | End: 2022-01-03 | Stop reason: HOSPADM

## 2021-12-29 RX ORDER — ONDANSETRON 4 MG/1
4 TABLET, ORALLY DISINTEGRATING ORAL EVERY 8 HOURS PRN
Status: DISCONTINUED | OUTPATIENT
Start: 2021-12-29 | End: 2022-01-03 | Stop reason: HOSPADM

## 2021-12-29 RX ORDER — ACETAMINOPHEN 325 MG/1
650 TABLET ORAL EVERY 4 HOURS PRN
Status: DISCONTINUED | OUTPATIENT
Start: 2021-12-29 | End: 2022-01-03 | Stop reason: HOSPADM

## 2021-12-29 RX ORDER — SODIUM CHLORIDE 9 MG/ML
INJECTION, SOLUTION INTRAVENOUS CONTINUOUS
Status: DISCONTINUED | OUTPATIENT
Start: 2021-12-29 | End: 2022-01-03 | Stop reason: HOSPADM

## 2021-12-29 RX ORDER — GUAIFENESIN/DEXTROMETHORPHAN 100-10MG/5
10 SYRUP ORAL EVERY 6 HOURS PRN
Status: DISCONTINUED | OUTPATIENT
Start: 2021-12-29 | End: 2022-01-03 | Stop reason: HOSPADM

## 2021-12-29 RX ORDER — CALCIUM CARBONATE 200(500)MG
750 TABLET,CHEWABLE ORAL 3 TIMES DAILY PRN
Status: DISCONTINUED | OUTPATIENT
Start: 2021-12-29 | End: 2022-01-03 | Stop reason: HOSPADM

## 2021-12-29 RX ORDER — LOPERAMIDE HYDROCHLORIDE 2 MG/1
2 CAPSULE ORAL 4 TIMES DAILY PRN
Status: DISCONTINUED | OUTPATIENT
Start: 2021-12-29 | End: 2022-01-03 | Stop reason: HOSPADM

## 2021-12-29 RX ORDER — ZOLPIDEM TARTRATE 5 MG/1
5 TABLET ORAL NIGHTLY PRN
Status: DISCONTINUED | OUTPATIENT
Start: 2021-12-29 | End: 2022-01-03 | Stop reason: HOSPADM

## 2021-12-29 RX ORDER — PROMETHAZINE HYDROCHLORIDE 25 MG/1
12.5 TABLET ORAL EVERY 6 HOURS PRN
Status: DISCONTINUED | OUTPATIENT
Start: 2021-12-29 | End: 2022-01-03 | Stop reason: HOSPADM

## 2021-12-29 RX ORDER — ACETAMINOPHEN 650 MG/1
650 SUPPOSITORY RECTAL EVERY 6 HOURS PRN
Status: DISCONTINUED | OUTPATIENT
Start: 2021-12-29 | End: 2022-01-03 | Stop reason: HOSPADM

## 2021-12-29 RX ADMIN — KIT FOR THE PREPARATION OF TECHNETIUM TC99M SESTAMIBI 10 MILLICURIE: 1 INJECTION, POWDER, LYOPHILIZED, FOR SOLUTION PARENTERAL at 10:40

## 2021-12-29 RX ADMIN — SODIUM CHLORIDE, PRESERVATIVE FREE 5 ML: 5 INJECTION INTRAVENOUS at 22:09

## 2021-12-29 RX ADMIN — SODIUM CHLORIDE: 9 INJECTION, SOLUTION INTRAVENOUS at 17:41

## 2021-12-29 RX ADMIN — KIT FOR THE PREPARATION OF TECHNETIUM TC99M SESTAMIBI 30 MILLICURIE: 1 INJECTION, POWDER, LYOPHILIZED, FOR SOLUTION PARENTERAL at 10:40

## 2021-12-29 RX ADMIN — MAGNESIUM OXIDE 400 MG: 400 TABLET ORAL at 21:49

## 2021-12-29 RX ADMIN — GUAIFENESIN 1200 MG: 600 TABLET, EXTENDED RELEASE ORAL at 21:49

## 2021-12-29 RX ADMIN — SODIUM CHLORIDE, PRESERVATIVE FREE 5 ML: 5 INJECTION INTRAVENOUS at 22:08

## 2021-12-29 RX ADMIN — SODIUM CHLORIDE, PRESERVATIVE FREE 10 ML: 5 INJECTION INTRAVENOUS at 11:14

## 2021-12-29 RX ADMIN — REGADENOSON 0.4 MG: 0.08 INJECTION, SOLUTION INTRAVENOUS at 09:04

## 2021-12-29 RX ADMIN — ENOXAPARIN SODIUM 50 MG: 100 INJECTION SUBCUTANEOUS at 21:49

## 2021-12-29 RX ADMIN — AMINOPHYLLINE 75 MG: 25 INJECTION, SOLUTION INTRAVENOUS at 09:09

## 2021-12-29 RX ADMIN — IOPAMIDOL 80 ML: 755 INJECTION, SOLUTION INTRAVENOUS at 10:35

## 2021-12-29 RX ADMIN — ENOXAPARIN SODIUM 50 MG: 100 INJECTION SUBCUTANEOUS at 11:12

## 2021-12-29 RX ADMIN — AZITHROMYCIN DIHYDRATE 500 MG: 500 INJECTION, POWDER, LYOPHILIZED, FOR SOLUTION INTRAVENOUS at 00:25

## 2021-12-29 RX ADMIN — FUROSEMIDE 20 MG: 10 INJECTION, SOLUTION INTRAMUSCULAR; INTRAVENOUS at 17:38

## 2021-12-29 ASSESSMENT — PAIN SCALES - GENERAL
PAINLEVEL_OUTOF10: 0
PAINLEVEL_OUTOF10: 0

## 2021-12-29 NOTE — ED PROVIDER NOTES
Emergency [address] EMERGENCY DEPARTMENT    Patient: Cecilia Wasserman  MRN: [id number]  : 1965  Date of Evaluation: 2021  ED Provider: Jena Huston PA-C    Chief Complaint       Chief Complaint   Patient presents with    Shortness of Breath    Leg Swelling       ESTHER Wasserman is a 60 y.o. female who presents to the emergency department for shortness of breath and edema. Onset was about a week ago. Constant worsening. Edema is to the face, extremities, torso. She states she took a water pill yesterday but does not typically take them. She denies chest pain. She reports a cough that is occasionally productive of green sputum. Denies n/v/d. No known sick contacts. ROS     CONSTITUTIONAL:  Denies fever. EYES:  Denies visual changes. HEAD:  Denies headache. ENT:  Denies earache, nasal congestion, sore throat. NECK:  Denies neck pain. RESPIRATORY:  + cough, shortness of breath. CARDIOVASCULAR:  Denies chest pain. GI:  Denies nausea or vomiting. :  Denies urinary symptoms. MUSCULOSKELETAL:  + edema. BACK:  Denies back pain. INTEGUMENT:  Denies skin changes. LYMPHATIC:  Denies lymphadenopathy. NEUROLOGIC:  Denies any numbness/tingling. PSYCHIATRIC:  Denies SI/HI. Past History     Past Medical History:   Diagnosis Date    COPD (chronic obstructive pulmonary disease) ([facility] [address]      No past surgical history on file.   Social History     Socioeconomic History    Marital status: Single     Spouse name: Not on file    Number of children: Not on file    Years of education: Not on file    Highest education level: Not on file   Occupational History    Not on file   Tobacco Use    Smoking status: Former Smoker     Packs/day: 1.00     Years: 45.00     Pack years: 45.00     Types: Cigarettes    Smokeless tobacco: Never Used   Substance and Sexual Activity    Alcohol use: No    Drug use: Yes     Types: Methamphetamines (Crystal Meth)    Sexual activity: Not on file   Other Topics Concern    Not on file   Social History Narrative    Not on file     Social Determinants of Health     Financial Resource Strain:     Difficulty of Paying Living Expenses: Not on file   Food Insecurity:     Worried About Running Out of Food in the Last Year: Not on file    Sj of Food in the Last Year: Not on file   Transportation Needs:     Lack of Transportation (Medical): Not on file    Lack of Transportation (Non-Medical): Not on file   Physical Activity:     Days of Exercise per Week: Not on file    Minutes of Exercise per Session: Not on file   Stress:     Feeling of Stress : Not on file   Social Connections:     Frequency of Communication with Friends and Family: Not on file    Frequency of Social Gatherings with Friends and Family: Not on file    Attends Mu-ism Services: Not on file    Active Member of [address] [place] [address] dateIITians or Organizations: Not on file    Attends Club or Organization Meetings: Not on file    Marital Status: Not on file   Intimate Partner Violence:     Fear of Current or Ex-Partner: Not on file    Emotionally Abused: Not on file    Physically Abused: Not on file    Sexually Abused: Not on file   Housing Stability:     Unable to Pay for Housing in the Last Year: Not on file    Number of [place] in the Last Year: Not on file    Unstable Housing in the Last Year: Not on file       Medications/Allergies     Previous Medications    ALBUTEROL SULFATE IN    Inhale into the lungs    ASPIRIN 81 MG TABLET    Take 81 mg by mouth daily    ATORVASTATIN (LIPITOR) 40 MG TABLET    take 1 tablet by mouth once daily    BREO ELLIPTA 100-25 MCG/INH AEPB INHALER        CETIRIZINE (ZYRTEC ALLERGY) 10 MG TABLET    Take 1 tablet by mouth daily    IBUPROFEN (IBU) 800 MG TABLET    Take 1 tablet by mouth every 6 hours as needed for Pain. LIDOCAINE (LIDODERM) 5 %    apply 1 patch to the affected area daily.  Leave on for 12 hours and then off for 12 hours. MUCINEX 600 MG EXTENDED RELEASE TABLET    take 1 tablet by mouth twice a day    NAPROXEN (NAPROSYN) 250 MG TABLET    take 1 tablet by mouth twice a day    OMEPRAZOLE (PRILOSEC) 10 MG DELAYED RELEASE CAPSULE    Take 10 mg by mouth daily    SPIRIVA RESPIMAT 1.25 MCG/ACT AERS INHALER    inhale 2 puffs INTO THE LUNGS once daily    VENTOLIN  (90 BASE) MCG/ACT INHALER    Inhale 2 puffs into the lungs 4 times daily     Allergies   Allergen Reactions    Darvocet [Propoxyphene N-Acetaminophen] Hives        Physical Exam       ED Triage Vitals   BP Temp Temp Source Pulse Resp SpO2 Height Weight   12/28/21 2000 12/28/21 2052 12/28/21 2052 12/28/21 2000 12/28/21 2000 12/28/21 2000 -- --   114/67 98.9 °F (37.2 °C) Oral 96 16 93 %       GENERAL APPEARANCE:  Well-developed, well-nourished, no acute distress. HEAD:  NC/AT. EYES:  Sclera anicteric. Bilateral periorbital edema. ENT:  Ears, nose, mouth normal.     NECK:  Supple. CARDIO:  RRR. LUNGS:   CTAB. Respirations unlabored. ABDOMEN:  Soft, non-distended, non-tender. BS active. EXTREMITIES:  No acute deformities. 1+ pitting edema of bilateral LE. SKIN:  Warm and dry. NEUROLOGICAL:  Alert and oriented. PSYCHIATRIC:  Normal mood.      Diagnostics     Labs:  Results for orders placed or performed during the hospital encounter of 12/28/21   CBC auto diff   Result Value Ref Range    WBC 5.9 4.0 - 10.5 K/CU MM    RBC 6.24 (H) 4.2 - 5.4 M/CU MM    Hemoglobin 16.6 (H) 12.5 - 16.0 GM/DL    Hematocrit 55.9 (H) 37 - 47 %    MCV 89.6 78 - 100 FL    MCH 26.6 (L) 27 - 31 PG    MCHC 29.7 (L) 32.0 - 36.0 %    RDW 14.8 11.7 - 14.9 %    Platelets 365 197 - 804 K/CU MM    MPV 10.7 7.5 - 11.1 FL    Differential Type AUTOMATED DIFFERENTIAL     Segs Relative 66.3 (H) 36 - 66 %    Lymphocytes % 16.7 (L) 24 - 44 %    Monocytes % 15.8 (H) 0 - 4 %    Eosinophils % 0.5 0 - 3 %    Basophils % 0.5 0 - 1 %    Segs Absolute 3.9 K/CU MM Lymphocytes Absolute 1.0 K/CU MM    Monocytes Absolute 0.9 K/CU MM    Eosinophils Absolute 0.0 K/CU MM    Basophils Absolute 0.0 K/CU MM    Nucleated RBC % 0.9 %    Total Nucleated RBC 0.1 K/CU MM    Total Immature Neutrophil 0.01 K/CU MM    Immature Neutrophil % 0.2 0 - 0.43 %   Blood Gas, Venous   Result Value Ref Range    pH, Brayden 7.30 (L) 7.32 - 7.42    pCO2, Brayden 64 (H) 38 - 52 mmHG    pO2, Brayden 69 (H) 28 - 48 mmHG    Base Exc, Mixed 3.2 (H) 0 - 2.3    HCO3, Venous 31.5 (H) 19 - 25 MMOL/L    O2 Sat, Brayden 85.6 (H) 50 - 70 %    Comment VBG    Lactic Acid, Plasma   Result Value Ref Range    Lactate 1.0 0.4 - 2.0 mMOL/L   EKG 12 Lead   Result Value Ref Range    Ventricular Rate 114 BPM    Atrial Rate 114 BPM    P-R Interval 126 ms    QRS Duration 74 ms    Q-T Interval 346 ms    QTc Calculation (Bazett) 476 ms    P Axis 116 degrees    R Axis 151 degrees    T Axis 58 degrees    Diagnosis        Suspect arm lead reversal, interpretation assumes no reversal  Sinus tachycardia with premature supraventricular complexes and with occasional and consecutive premature ventricular complexes  Right axis deviation  Pulmonary disease pattern  Right ventricular hypertrophy  Abnormal ECG  When compared with ECG of 27-FEB-2017 17:19,  premature ventricular complexes are now present  premature supraventricular complexes are now present  QRS axis shifted right  Nonspecific T wave abnormality now evident in Anterior leads         Radiographs:  XR CHEST PORTABLE    Result Date: 12/28/2021  EXAMINATION: ONE XRAY VIEW OF THE CHEST 12/28/2021 8:28 pm COMPARISON: Chest radiograph 07/02/2019. HISTORY: ORDERING SYSTEM PROVIDED HISTORY: SOB TECHNOLOGIST PROVIDED HISTORY: Reason for exam:->SOB Reason for Exam: SOB FINDINGS: The cardiomediastinal silhouette is within normal limits. Small right pleural effusion. Bibasilar airspace opacities right worse than left. No pneumothorax or vascular congestion. No acute osseous abnormality.      Bibasilar airspace opacities right worse than left suspicious for multifocal pneumonia. Small right pleural effusion. Procedures/EKG:   Please see attending physician's note for interpretation. ED Course and MDM   -  Patient seen and evaluated in the emergency department. -  Triage and nursing notes reviewed and incorporated. -  Old chart records reviewed and incorporated. -  Supervising physician was Dr. Juanita Hill. Patient was seen independently. -  Work-up included:  See above  -  Results discussed with patient. I suspect new onset CHF--she has a BNP of 3098, troponin detectable at 0.011. CXR shows bibasilar opacities, right > left, small right pleural effusion. Negative respiratory panel. Will cover for pneumonia as well. I spoke with patient's PCP, Dr. Yesy Brownlee, who will admit. CRITICAL CARE NOTE:  There was a high probability of clinically significant life-threatening deterioration of the patient's condition requiring my urgent intervention due to hypoxia. Telemetry monitoring, review of labs and imaging, oxygen therapy, IV lasix, steroids,  and ABX, consult PCP was performed to address this. Total critical care time is at least  40 minutes. This includes vital sign monitoring, pulse oximetry monitoring, telemetry monitoring, clinical response to the IV medications, reviewing the nursing notes, consultation time, dictation/documentation time, and interpretation of the lab work. This time excludes time spent performing procedures and separately billable procedures and family discussion time. In light of current events, I did utilize appropriate PPE (including N95 and surgical face mask, safety glasses, and gloves, as recommended by the health facility/national standard best practice, during my bedside interactions with the patient. Final Impression      1. Pneumonia due to infectious organism, unspecified laterality, unspecified part of lung    2. Leg swelling    3. Periorbital edema    4. Hypoxia    5.  Elevated brain natriuretic peptide (BNP) level            DISPOSITION        Cecilia Banks PA-C  [address] [facility], [place]  12/29/21 0113

## 2021-12-29 NOTE — CONSULTS
CARDIOLOGY CONSULT NOTE      Reason for consultation:  Eval patient     Referring physician:  No admitting provider for patient encounter.      Primary care physician: Kristin Mcfarland MD        Thank you for the consult.     Chief Complaints :      Chief Complaint   Patient presents with    Shortness of Breath    Leg Swelling         History of present illness:Eloina is a 60 y. o.year old female with a past medical history of COPD. Patient presents with shortness of breath and leg swelling. Patient is in the room with her son. She stated that she fell approximately 2 weeks ago and following that fall she began to develop swelling, predominately, on the right side of her body. The swelling on the right side of her body began in her feet and progressed up to her abdomen, right breast and to her face. The swelling was painful and she decided to take a \"water pill\" from her neighbor. The water pill helped reduce her swelling and took some of the pain away. However, she also began to experience some increased shortness of breath. Patient has some shortness of breath at her baseline, secondary to COPD, but she noticed it worsening, especially last night. Her oxygen had dropped to the 70s and was needed to be placed on oxygen in the ED. She normally uses 2 L of oxygen at night, but stated that she typically goes without it during the day.      Patient has a history of smoking and continues to smoke a 0.5 ppd, which is less than previously. 45 pack year history.      She endorses dyspnea on exertion and , but denied chest pain, palpitations, headache, lightheadedness, and illicit drug use.         Past medical history:    has a past medical history of COPD (chronic obstructive pulmonary disease) ([address] Past surgical history:   has no past surgical history on file. Social History:   reports that she has quit smoking. Her smoking use included cigarettes. She has a 45.00 pack-year smoking history.  She has never used smokeless tobacco. She reports current drug use. Drug: Methamphetamines (Crystal Meth). She reports that she does not drink alcohol.   Family history:   no known family history of CAD, STROKE of DM at early age          Allergies   Allergen Reactions    Darvocet [Propoxyphene N-Acetaminophen] Hives           Current Meds Link used for Sign Out Report   sodium chloride flush 0.9 % injection 5-40 mL, 2 times per day  sodium chloride flush 0.9 % injection 5-40 mL, PRN  0.9 % sodium chloride infusion, PRN  acetaminophen (TYLENOL) tablet 650 mg, Q4H PRN  ondansetron (ZOFRAN-ODT) disintegrating tablet 4 mg, Q8H PRN   Or  ondansetron (ZOFRAN) injection 4 mg, Q6H PRN  technetium sestamibi (CARDIOLITE) injection 10 millicurie, ONCE PRN  technetium sestamibi (CARDIOLITE) injection 30 millicurie, ONCE PRN         Current Facility-Administered Medications             Current Facility-Administered Medications   Medication Dose Route Frequency Provider Last Rate Last Admin    sodium chloride flush 0.9 % injection 5-40 mL  5-40 mL IntraVENous 2 times per day Leny Aguillon MD        sodium chloride flush 0.9 % injection 5-40 mL  5-40 mL IntraVENous PRN Leny Aguillon MD        0.9 % sodium chloride infusion  25 mL IntraVENous PRN Leny Aguillon MD        acetaminophen (TYLENOL) tablet 650 mg  650 mg Oral Q4H PRN Leny Aguillon MD        ondansetron (ZOFRAN-ODT) disintegrating tablet 4 mg  4 mg Oral Q8H PRN Leny Aguillon MD         Or    ondansetron (ZOFRAN) injection 4 mg  4 mg IntraVENous Q6H PRN Leny Aguillon MD        technetium sestamibi (CARDIOLITE) injection 10 millicurie  10 millicurie IntraVENous ONCE PRN Rickey Ponce MD        technetium sestamibi (CARDIOLITE) injection 30 millicurie  30 millicurie IntraVENous ONCE PRN Rickey Ponce MD                 Current Outpatient Medications   Medication Sig Dispense Refill    VENTOLIN  (90 Base) MCG/ACT inhaler Inhale 2 puffs into the lungs 4 times daily 1 Inhaler 3    BREO ELLIPTA 100-25 MCG/INH AEPB inhaler     0    omeprazole (PRILOSEC) 10 MG delayed release capsule Take 10 mg by mouth daily        SPIRIVA RESPIMAT 1.25 MCG/ACT AERS inhaler inhale 2 puffs INTO THE LUNGS once daily 4 g 5    aspirin 81 MG tablet Take 81 mg by mouth daily        lidocaine (LIDODERM) 5 % apply 1 patch to the affected area daily. Leave on for 12 hours and then off for 12 hours.   0    naproxen (NAPROSYN) 250 MG tablet take 1 tablet by mouth twice a day   0    atorvastatin (LIPITOR) 40 MG tablet take 1 tablet by mouth once daily   0    MUCINEX 600 MG extended release tablet take 1 tablet by mouth twice a day 60 tablet 5    cetirizine (ZYRTEC ALLERGY) 10 MG tablet Take 1 tablet by mouth daily 30 tablet 5    ALBUTEROL SULFATE IN Inhale into the lungs        ibuprofen (IBU) 800 MG tablet Take 1 tablet by mouth every 6 hours as needed for Pain. 20 tablet 0            Review of Systems   Constitutional: Negative for diaphoresis and fatigue. HENT: Positive for facial swelling. Respiratory: Positive for cough and shortness of breath. Cardiovascular: Positive for leg swelling. Negative for chest pain and palpitations. Gastrointestinal: Positive for abdominal distention and abdominal pain. Endocrine: Positive for cold intolerance. Negative for heat intolerance. Musculoskeletal: Positive for arthralgias. Skin: Negative for color change. Neurological: Negative for light-headedness and headaches. Facial asymmetry:  facial swelling last night, but has resolved. Psychiatric/Behavioral: Negative for dysphoric mood.  The patient is not nervous/anxious.             Physical Examination:    Vitals          Vitals:     12/28/21 2230 12/28/21 2355 12/29/21 0342 12/29/21 0443   BP: (!) 130/109     117/75   Pulse: 103   93 93   Resp: 20   25 23   Temp:           TempSrc:           SpO2: 92%   95% 92%   Weight:   110 lb (49.9 kg)       Height:   5' 8\" (1.727 m)           Physical Exam  Constitutional:       General: She is not in acute distress. Appearance: She is cachectic. She is not diaphoretic. HENT:      Head: Normocephalic and atraumatic. Right Ear: External ear normal.      Left Ear: External ear normal.      Nose: Nose normal.   Eyes:      Extraocular Movements: Extraocular movements intact. Pupils: Pupils are equal, round, and reactive to light. Cardiovascular:      Rate and Rhythm: Normal rate and regular rhythm. Pulses:           Carotid pulses are 2+ on the right side and 2+ on the left side. Radial pulses are 2+ on the right side and 2+ on the left side. Dorsalis pedis pulses are 2+ on the right side and 2+ on the left side. Posterior tibial pulses are 2+ on the right side and 2+ on the left side. Heart sounds: S1 normal and S2 normal. No S3 or S4 sounds. Comments: Patient stated that the swelling has been worse on the right side of her body, but she had significantly more edema on her left foot and leg in comparison to the right. Pulmonary:      Breath sounds: Decreased air movement present. Wheezing and rhonchi present. Comments: Some secondary muscle use. Abdominal:      General: There is no distension. Palpations: There is no mass. Comments: Diffuse tenderness to palpation   Musculoskeletal:      Right lower le+ Edema present. Left lower le+ Edema present. Skin:     General: Skin is warm and dry. Capillary Refill: Capillary refill takes less than 2 seconds. Neurological:      Mental Status: She is alert and oriented to person, place, and time. Mental status is at baseline. Psychiatric:         Mood and Affect: Mood normal.         Behavior: Behavior is cooperative. Thought Content:  Thought content normal.         Judgment: Judgment normal.            Medical decision making and Data review:     Lab Review       Recent Labs     21   WBC 5.9 HGB 16.6*   HCT 55.9*         Recent Labs     21      K 4.5   CL 99   CO2 31   BUN 24*   CREATININE 0.7          Recent Labs     21   AST 14*   ALT 11   BILITOT 0.5   ALKPHOS 91          Recent Labs     21   TROPONINT 0.011*           Recent Labs     21   PROBNP 3,098*      No results found for: INR, PROTIME     EK2021  Sinus tachycardia with premature supraventricular complexes and with occasional and consecutive premature ventricular complexes      ECHO: 2021   Summary   Left ventricular systolic function is normal.   Ejection fraction is visually estimated at 55%.   D-shaped septum indicative of right ventricular volume overload.   Severely dilated and hypokinetic right ventricle.   No evidence of any pericardial effusion.     Chest Xray:  XR CHEST PORTABLE     Result Date: 2021  EXAMINATION: ONE XRAY VIEW OF THE CHEST 2021 8:28 pm COMPARISON: Chest radiograph 2019. HISTORY: ORDERING SYSTEM PROVIDED HISTORY: SOB TECHNOLOGIST PROVIDED HISTORY: Reason for exam:->SOB Reason for Exam: SOB FINDINGS: The cardiomediastinal silhouette is within normal limits. Small right pleural effusion. Bibasilar airspace opacities right worse than left. No pneumothorax or vascular congestion. No acute osseous abnormality.       Bibasilar airspace opacities right worse than left suspicious for multifocal pneumonia. Small right pleural effusion.         All labs, medications and tests reviewed by myself including data  from outside source , patient and available family . Continue all other medications of all above medical condition listed as is.      Impression:  Active Problems:    Pneumonia  Resolved Problems:    * No resolved hospital problems. *        Assessment: 60 y. o.year old female with:  1. Cor Pulmonale  2. COPD  3. Pneumonia           Plan and Recommendations:     1. Cor pulmonale  1.  -BNP of 3,098  2. -Echo completed Ejection fraction is visually estimated at 55%. D-shaped septum indicative of right ventricular volume overload. Severely dilated and hypokinetic right ventricle. No evidence of any pericardial effusion. 1. Dilated hypokinetic RV. No evidence of left sided decompensation. 3.  -The patient appears to be in decompensated heart failure. Significant lower extremity edema and increased difficulty of breathing. 4. -CTA with contrast completed   1. No evidence of pulmonary embolism  2. Moderate right sided and trace left sided pleural effusion note              -No evidence of of CAD on stress test.               -Anticoagulate with Lovenox  2. COPD              -On oxygen.              -Advised smoking cessation  3.  Pneumonia

## 2021-12-29 NOTE — ED NOTES
Bed: ED-39  Expected date:   Expected time:   Means of arrival:   Comments:  [address], RN  12/28/21 9882

## 2021-12-29 NOTE — ED TRIAGE NOTES
Patient presents to the ER with c/o of shortness of breath. Patient was 70% on room air. Patient wears 2L O2 at night occassionaly. Patient also states fluid overload in legs & abdomen. Patient denies any pain. Patient is alert & oriented. Patient 80% on 6L nasal cannula, patient placed on 15L nonrebreather at this time at 97%.

## 2021-12-29 NOTE — ED PROVIDER NOTES
As provider-in-triage, I performed a medical screening history and physical exam on this patient. HISTORY OF PRESENT ILLNESS  Oneal Nelson is a 60 y.o. female who presents to the emergency department today with shortness of breath. Has been progressive over the last 2 weeks. For that she has a history of COPD, she continues to smoke, states that she has had some fluid retention. Patient is talking in full sentences but does have some sensory muscle use. She denies any chest pain. Feels like her legs are swollen. Denies cough, fever. No Covid exposures. PHYSICAL EXAM  /66   Pulse 83   Temp 97.8 °F (36.6 °C) (Oral)   Resp 20   Ht 5' 8\" (1.727 m)   Wt 110 lb (49.9 kg)   SpO2 95%   BMI 16.73 kg/m²     On exam, the patient appears in no acute distress. Speech is clear. Breathing is unlabored. Moves all extremities    Comment: Please note this report has been produced using speech recognition software and may contain errors related to that system including errors in grammar, punctuation, and spelling, as well as words and phrases that may be inappropriate. If there are any questions or concerns please feel free to contact the dictating provider for clarification.         Cheikh Kelsey [address], PA  12/30/21 1050

## 2021-12-29 NOTE — ED PROVIDER NOTES
EKG shows sinus rhythm rate of 106 normal CT and QRS intervals no ST elevation no old EKG available for comparison     Rebecca Garcia MD  12/29/21 3622

## 2021-12-29 NOTE — PROGRESS NOTES
CHART REVIEWED  PT EXAMINED  ADMITTED WITH SOB AND FACE SWELLING  HAS ELEV BNP AND TROP  HAD ?  STRESS IN PAST  WILL RECHECK ECHO AND STRESS

## 2021-12-29 NOTE — H&P
HISTORY AND PHYSICAL    2021     Patient Information:    Patient: Bright Lopez     Gender: female  : 1965   Age: 60 y.o. MRN: [id number]  Room : ED39/ED-39      Pt`s preferred phone number :[phone]  [facility]  Extended Emergency Contact Information  Primary Emergency Contact: Carter Candelaria [address] of [address] [place] [address] Phone: [phone]  Relation: Child  Secondary Emergency Contact: Slava  Sonja Foster Phone: [phone]  Relation: Child        PCP:  Bobbi Osler, MD (Tel: [phone] )    Chief complaint:    Chief Complaint   Patient presents with    Shortness of Breath    Leg Swelling      Lab Results   Component Value Date    LABA1C 5.5 2019       Lab Results   Component Value Date    LVEF 60 2018       Body mass index is 16.73 kg/m². History of Present Illness:  Bright Lopez is a 60 y.o. female with   has a past medical history of COPD (chronic obstructive pulmonary disease) ([address] who presented to ER with cough and SOB and leg swlleing over last week got worse lately and she took a lasix from at friend . Pt is non complaint with PCP office and she is not taking her meds and still smoking heavy . Pt smoker for almost 40 years and she is on O2 2 L  On and off . Pt denied any fever, chills. In ER lab data revealed elevated BNP   And C XR .revealed   Bibasilar airspace opacities right worse than left suspicious for multifocal   pneumonia.  Small right pleural effusion. And chest   CTA revealed   No convincing evidence of discrete intraluminal filling defect is seen to   suggest underlying acute pulmonary emboli.       Imaging features suggestive of underlying congestive heart failure/pulmonary   edema.  Moderate size right and trace left pleural effusion.    Atelectasis/opacity of the right lower lobe.  Correlate clinically with signs   of pneumonia.  Follow-up to imaging resolution is recommended.       4 mm nodule in the right upper lobe.  If the patient is low risk, this does   not meet the criteria for imaging follow-up.  If the patient is high risk,   optional follow-up chest CT in 12 months is recommended.  Mild emphysematous   changes of the lungs. History obtained from patient . REVIEW OF SYSTEMS:   Constitutional: Negative for fever,chills . ENT: Negative for rhinorrhea,  sore throat. Respiratory: + for cough, shortness of breath,wheezing  Cardiovascular: Negative for chest pain, palpitations , + leg swelling   Gastrointestinal: Negative for Abdominal pain, nausea, vomiting, diarrhea  Genitourinary: Negative for polyuria, dysuria   Hematologic/Lymphatic: Negative for bleeding tendency, easy bruising  Musculoskeletal: Negative for myalgias and arthralgias  Neurologic: Negative for confusion,dysarthria. Skin: Negative for itching,rash  Psychiatric: Negative for depression,anxiety, agitation. Endocrine: Negative for polydipsia,polyuria,heat /cold intolerance. Past Medical History:   has a past medical history of COPD (chronic obstructive pulmonary disease) ([facility] [address] Past Surgical History:   has no past surgical history on file. Medications:  No current facility-administered medications on file prior to encounter. Current Outpatient Medications on File Prior to Encounter   Medication Sig Dispense Refill    VENTOLIN  (90 Base) MCG/ACT inhaler Inhale 2 puffs into the lungs 4 times daily 1 Inhaler 3    BREO ELLIPTA 100-25 MCG/INH AEPB inhaler   0    omeprazole (PRILOSEC) 10 MG delayed release capsule Take 10 mg by mouth daily      SPIRIVA RESPIMAT 1.25 MCG/ACT AERS inhaler inhale 2 puffs INTO THE LUNGS once daily 4 g 5    aspirin 81 MG tablet Take 81 mg by mouth daily      lidocaine (LIDODERM) 5 % apply 1 patch to the affected area daily. Leave on for 12 hours and then off for 12 hours.   0    naproxen (NAPROSYN) 250 MG tablet take 1 tablet by mouth twice a day  0    atorvastatin (LIPITOR) 40 MG tablet take 1 tablet by mouth once daily  0    MUCINEX 600 MG extended release tablet take 1 tablet by mouth twice a day 60 tablet 5    cetirizine (ZYRTEC ALLERGY) 10 MG tablet Take 1 tablet by mouth daily 30 tablet 5    ALBUTEROL SULFATE IN Inhale into the lungs      ibuprofen (IBU) 800 MG tablet Take 1 tablet by mouth every 6 hours as needed for Pain. 20 tablet 0       Allergies: Allergies   Allergen Reactions    Darvocet [Propoxyphene N-Acetaminophen] Hives        Social History:   reports that she has quit smoking. Her smoking use included cigarettes. She has a 45.00 pack-year smoking history. She uses smokeless tobacco. She reports current drug use. Drug: Methamphetamines (Crystal Meth). She reports that she does not drink alcohol. Family History:  family history is not on file. ,       There is no immunization history on file for this patient. Physical Exam:  /75   Pulse 93   Temp 98.9 °F (37.2 °C) (Oral)   Resp 23   Ht 5' 8\" (1.727 m)   Wt 110 lb (49.9 kg)   SpO2 92%   BMI 16.73 kg/m²     General appearance:  + distress . Well nourished  Eyes: Sclera clear, pupils equal  Cardiovascular: Regular rhythm, normal S1, S2.   ++ edema in lower extremities  Respiratory: tight chest bilaterally, + wheeze, good inspiratory effort  Gastrointestinal: Abdomen soft, non-tender, not distended, normal bowel sounds  Musculoskeletal: No cyanosis in digits, neck supple  Neurology: Cranial nerves grossly intact. Alert and oriented . No speech or motor deficits  Psychiatry: Appropriate affect.  Not agitated  Skin: Warm, dry, normal turgor, no rash    Labs:  CBC:   Lab Results   Component Value Date    WBC 5.9 12/28/2021    RBC 6.24 12/28/2021    HGB 16.6 12/28/2021    HCT 55.9 12/28/2021    MCV 89.6 12/28/2021    MCH 26.6 12/28/2021    MCHC 29.7 12/28/2021    RDW 14.8 12/28/2021     12/28/2021    MPV 10.7 12/28/2021     BMP:    Lab Results   Component Value Date     12/28/2021    K 4.5 12/28/2021    CL 99 12/28/2021    CO2 31 12/28/2021    BUN 24 12/28/2021    CREATININE 0.7 12/28/2021    CALCIUM 8.6 12/28/2021    GFRAA >60 12/28/2021    LABGLOM >60 12/28/2021    GLUCOSE 112 12/28/2021       Chest Xray:   EKG:    I visualized CXR images and EKG strips      Patient Active Problem List   Diagnosis Code    Centrilobular emphysema ([facility] [address] J43.2    Bilateral carotid artery stenosis I65.23    Pneumonia J18.9         Active Hospital Problems    Diagnosis     Pneumonia [J18.9]    acute  CHF   Cardiomyopathy   COPD  Chronic resp failure with hypoxia   Tobacco abuse   Bibasilar airspace opacities right worse than left suspicious for multifocal pneumonia. Small right pleural effusion.   4 mm nodule in the right upper lobe        Assessment/Plan:   Tele   IV lasix, lytes balance   IV ABX  resp therapy   Cardiac markers, stress test , cardiology consult   Home meds , reviewed and resumed as appropriate   Symptoms releif/Pain control  DVT proph           Jayda Becerril MD    12/29/2021 5:16 PM

## 2021-12-30 PROBLEM — E43 SEVERE MALNUTRITION (HCC): Chronic | Status: ACTIVE | Noted: 2021-12-30

## 2021-12-30 LAB
ANION GAP SERPL CALCULATED.3IONS-SCNC: 5 MMOL/L (ref 4–16)
BASOPHILS ABSOLUTE: 0.1 K/CU MM
BASOPHILS RELATIVE PERCENT: 0.9 % (ref 0–1)
BUN BLDV-MCNC: 18 MG/DL (ref 6–23)
CALCIUM SERPL-MCNC: 8.3 MG/DL (ref 8.3–10.6)
CHLORIDE BLD-SCNC: 97 MMOL/L (ref 99–110)
CO2: 37 MMOL/L (ref 21–32)
CREAT SERPL-MCNC: 0.8 MG/DL (ref 0.6–1.1)
DIFFERENTIAL TYPE: ABNORMAL
EOSINOPHILS ABSOLUTE: 0 K/CU MM
EOSINOPHILS RELATIVE PERCENT: 0.7 % (ref 0–3)
GFR AFRICAN AMERICAN: >60 ML/MIN/1.73M2
GFR NON-AFRICAN AMERICAN: >60 ML/MIN/1.73M2
GLUCOSE BLD-MCNC: 95 MG/DL (ref 70–99)
HCT VFR BLD CALC: 53 % (ref 37–47)
HEMOGLOBIN: 15.4 GM/DL (ref 12.5–16)
IMMATURE NEUTROPHIL %: 0.2 % (ref 0–0.43)
LYMPHOCYTES ABSOLUTE: 1.5 K/CU MM
LYMPHOCYTES RELATIVE PERCENT: 26.3 % (ref 24–44)
MCH RBC QN AUTO: 26.1 PG (ref 27–31)
MCHC RBC AUTO-ENTMCNC: 29.1 % (ref 32–36)
MCV RBC AUTO: 90 FL (ref 78–100)
MONOCYTES ABSOLUTE: 0.9 K/CU MM
MONOCYTES RELATIVE PERCENT: 15.5 % (ref 0–4)
NUCLEATED RBC %: 0.3 %
PDW BLD-RTO: 14.7 % (ref 11.7–14.9)
PLATELET # BLD: 180 K/CU MM (ref 140–440)
PMV BLD AUTO: 11.2 FL (ref 7.5–11.1)
POTASSIUM SERPL-SCNC: 4.5 MMOL/L (ref 3.5–5.1)
RBC # BLD: 5.89 M/CU MM (ref 4.2–5.4)
SEGMENTED NEUTROPHILS ABSOLUTE COUNT: 3.3 K/CU MM
SEGMENTED NEUTROPHILS RELATIVE PERCENT: 56.4 % (ref 36–66)
SODIUM BLD-SCNC: 139 MMOL/L (ref 135–145)
TOTAL IMMATURE NEUTOROPHIL: 0.01 K/CU MM
TOTAL NUCLEATED RBC: 0 K/CU MM
WBC # BLD: 5.8 K/CU MM (ref 4–10.5)

## 2021-12-30 PROCEDURE — 80048 BASIC METABOLIC PNL TOTAL CA: CPT

## 2021-12-30 PROCEDURE — 99232 SBSQ HOSP IP/OBS MODERATE 35: CPT | Performed by: INTERNAL MEDICINE

## 2021-12-30 PROCEDURE — 2700000000 HC OXYGEN THERAPY PER DAY

## 2021-12-30 PROCEDURE — 6360000002 HC RX W HCPCS

## 2021-12-30 PROCEDURE — 1200000000 HC SEMI PRIVATE

## 2021-12-30 PROCEDURE — 85027 COMPLETE CBC AUTOMATED: CPT

## 2021-12-30 PROCEDURE — 6370000000 HC RX 637 (ALT 250 FOR IP): Performed by: FAMILY MEDICINE

## 2021-12-30 PROCEDURE — 6360000002 HC RX W HCPCS: Performed by: FAMILY MEDICINE

## 2021-12-30 PROCEDURE — 87040 BLOOD CULTURE FOR BACTERIA: CPT

## 2021-12-30 PROCEDURE — 2500000003 HC RX 250 WO HCPCS: Performed by: FAMILY MEDICINE

## 2021-12-30 PROCEDURE — 94640 AIRWAY INHALATION TREATMENT: CPT

## 2021-12-30 PROCEDURE — 6360000002 HC RX W HCPCS: Performed by: INTERNAL MEDICINE

## 2021-12-30 PROCEDURE — 94761 N-INVAS EAR/PLS OXIMETRY MLT: CPT

## 2021-12-30 PROCEDURE — 36415 COLL VENOUS BLD VENIPUNCTURE: CPT

## 2021-12-30 PROCEDURE — 2580000003 HC RX 258: Performed by: FAMILY MEDICINE

## 2021-12-30 PROCEDURE — 85025 COMPLETE CBC W/AUTO DIFF WBC: CPT

## 2021-12-30 PROCEDURE — 87081 CULTURE SCREEN ONLY: CPT

## 2021-12-30 PROCEDURE — APPSS60 APP SPLIT SHARED TIME 46-60 MINUTES: Performed by: NURSE PRACTITIONER

## 2021-12-30 RX ORDER — IPRATROPIUM BROMIDE AND ALBUTEROL SULFATE 2.5; .5 MG/3ML; MG/3ML
1 SOLUTION RESPIRATORY (INHALATION)
Status: DISCONTINUED | OUTPATIENT
Start: 2021-12-30 | End: 2021-12-30

## 2021-12-30 RX ORDER — LEVOFLOXACIN 5 MG/ML
500 INJECTION, SOLUTION INTRAVENOUS EVERY 24 HOURS
Status: DISCONTINUED | OUTPATIENT
Start: 2021-12-30 | End: 2022-01-03 | Stop reason: HOSPADM

## 2021-12-30 RX ORDER — IPRATROPIUM BROMIDE AND ALBUTEROL SULFATE 2.5; .5 MG/3ML; MG/3ML
1 SOLUTION RESPIRATORY (INHALATION) EVERY 4 HOURS PRN
Status: DISCONTINUED | OUTPATIENT
Start: 2021-12-30 | End: 2022-01-03 | Stop reason: HOSPADM

## 2021-12-30 RX ORDER — ALBUTEROL SULFATE 90 UG/1
2 AEROSOL, METERED RESPIRATORY (INHALATION) 4 TIMES DAILY
Status: DISCONTINUED | OUTPATIENT
Start: 2021-12-30 | End: 2022-01-03 | Stop reason: HOSPADM

## 2021-12-30 RX ADMIN — FUROSEMIDE 20 MG: 10 INJECTION, SOLUTION INTRAMUSCULAR; INTRAVENOUS at 17:54

## 2021-12-30 RX ADMIN — VANCOMYCIN 750 MG: 750 INJECTION, SOLUTION INTRAVENOUS at 17:55

## 2021-12-30 RX ADMIN — GUAIFENESIN 1200 MG: 600 TABLET, EXTENDED RELEASE ORAL at 19:58

## 2021-12-30 RX ADMIN — ENOXAPARIN SODIUM 50 MG: 100 INJECTION SUBCUTANEOUS at 19:57

## 2021-12-30 RX ADMIN — MAGNESIUM OXIDE 400 MG: 400 TABLET ORAL at 19:58

## 2021-12-30 RX ADMIN — ENOXAPARIN SODIUM 50 MG: 100 INJECTION SUBCUTANEOUS at 08:42

## 2021-12-30 RX ADMIN — GUAIFENESIN 1200 MG: 600 TABLET, EXTENDED RELEASE ORAL at 08:43

## 2021-12-30 RX ADMIN — ALBUTEROL SULFATE 2 PUFF: 90 AEROSOL, METERED RESPIRATORY (INHALATION) at 18:13

## 2021-12-30 RX ADMIN — SODIUM CHLORIDE, PRESERVATIVE FREE 10 ML: 5 INJECTION INTRAVENOUS at 21:43

## 2021-12-30 RX ADMIN — MAGNESIUM OXIDE 400 MG: 400 TABLET ORAL at 08:43

## 2021-12-30 RX ADMIN — LEVOFLOXACIN 500 MG: 5 INJECTION, SOLUTION INTRAVENOUS at 17:55

## 2021-12-30 RX ADMIN — SODIUM CHLORIDE: 9 INJECTION, SOLUTION INTRAVENOUS at 16:41

## 2021-12-30 RX ADMIN — FUROSEMIDE 20 MG: 10 INJECTION, SOLUTION INTRAMUSCULAR; INTRAVENOUS at 08:43

## 2021-12-30 ASSESSMENT — PAIN SCALES - GENERAL
PAINLEVEL_OUTOF10: 0
PAINLEVEL_OUTOF10: 0

## 2021-12-30 ASSESSMENT — ENCOUNTER SYMPTOMS: SHORTNESS OF BREATH: 0

## 2021-12-30 NOTE — PLAN OF CARE
Nutrition Problem #1: In context of chronic illness,Severe malnutrition  Intervention: Food and/or Nutrient Delivery: Continue Current Diet,Snacks (Comment)  Nutritional Goals: Patient will consume greater than half of meals and snacks

## 2021-12-30 NOTE — PROGRESS NOTES
Cardiology Progress Note     Today's Plan: sign off     Admit Date:  12/28/2021    Consult reason/ Seen today for: SOB    Subjective and  Overnight Events:  Patient reports SOB has improved. Assessment / Plan / Recommendation:     1. Cor pulmonale  1. -BNP of 3,098  2. -Echo completed                      Ejection fraction is visually estimated at 55%.                     D-shaped septum indicative of right ventricular volume overload.                      Severely dilated and hypokinetic right ventricle.                      No evidence of any pericardial effusion. 1. Dilated hypokinetic RV. No evidence of left sided decompensation. 3.  -The patient appears compensated. 4. -CTA with contrast completed   1. No evidence of pulmonary embolism  2. Moderate right sided and trace left sided pleural effusion note              -No evidence of of CAD on stress test.               -Anticoagulate with Lovenox  2. COPD              -On oxygen.              -Advised smoking cessation  3. Pneumonia      Will sign off, please re-consult for any new cardiac complaints or concerns. History of Presenting Illness:    Chief complain on admission : 60 y. o.year old who is admitted for  Chief Complaint   Patient presents with    Shortness of Breath    Leg Swelling        Past medical history:    has a past medical history of COPD (chronic obstructive pulmonary disease) ([facility] [address] Past surgical history:   has no past surgical history on file. Social History:   reports that she has quit smoking. Her smoking use included cigarettes. She has a 45.00 pack-year smoking history. She uses smokeless tobacco. She reports current drug use. Drug: Methamphetamines (Crystal Meth). She reports that she does not drink alcohol. Family history:  family history is not on file.     Allergies   Allergen Reactions    Darvocet [Propoxyphene N-Acetaminophen] Hives Review of Systems:  Review of Systems   Respiratory: Negative for shortness of breath. Cardiovascular: Negative for chest pain, palpitations and leg swelling. Musculoskeletal: Negative. Skin: Negative. Neurological: Negative for dizziness and weakness. All other systems reviewed and are negative. /66   Pulse 83   Temp 97.8 °F (36.6 °C) (Oral)   Resp 20   Ht 5' 8\" (1.727 m)   Wt 110 lb (49.9 kg)   SpO2 95%   BMI 16.73 kg/m²   No intake or output data in the 24 hours ending 12/30/21 1434    Physical Exam:  Physical Exam  Constitutional:       Appearance: She is well-developed. Cardiovascular:      Rate and Rhythm: Normal rate and regular rhythm. Pulses: Intact distal pulses. Dorsalis pedis pulses are 2+ on the right side and 2+ on the left side. Posterior tibial pulses are 2+ on the right side and 2+ on the left side. Heart sounds: Normal heart sounds, S1 normal and S2 normal.   Pulmonary:      Effort: Pulmonary effort is normal.      Breath sounds: Examination of the right-lower field reveals decreased breath sounds. Examination of the left-lower field reveals decreased breath sounds. Decreased breath sounds present. Musculoskeletal:         General: Normal range of motion. Skin:     General: Skin is warm and dry. Neurological:      Mental Status: She is alert and oriented to person, place, and time.           Medications:    sodium chloride flush  5-40 mL IntraVENous 2 times per day    enoxaparin  1 mg/kg SubCUTAneous BID    furosemide  20 mg IntraVENous BID    sodium chloride flush  5-40 mL IntraVENous 2 times per day    guaiFENesin  1,200 mg Oral BID    magnesium oxide  400 mg Oral BID      sodium chloride      sodium chloride 75 mL/hr at 12/29/21 1741    sodium chloride       sodium chloride flush, sodium chloride, acetaminophen, ondansetron **OR** ondansetron, sodium chloride flush, sodium chloride, potassium chloride **OR** potassium alternative oral replacement **OR** potassium chloride, magnesium sulfate, promethazine **OR** ondansetron, polyethylene glycol, fleet, acetaminophen **OR** acetaminophen, zolpidem, guaiFENesin-dextromethorphan, benzonatate, calcium carbonate, lactulose, loperamide    Lab Data:  CBC:   Recent Labs     12/28/21 2031 12/30/21  0632   WBC 5.9 5.8   HGB 16.6* 15.4   HCT 55.9* 53.0*   MCV 89.6 90.0    180     BMP:   Recent Labs     12/28/21 2031 12/30/21  0632    139   K 4.5 4.5   CL 99 97*   CO2 31 37*   BUN 24* 18   CREATININE 0.7 0.8     PT/INR: No results for input(s): PROTIME, INR in the last 72 hours. BNP:    Recent Labs     12/28/21 2031   PROBNP 3,098*     TROPONIN:   Recent Labs     12/28/21 2031   TROPONINT 0.011*        Impression:  Active Problems:    Pneumonia    Severe malnutrition ([facility] [address]  Resolved Problems:    * No resolved hospital problems. *       All labs, medications and tests reviewed by myself, continue all other medications of all above medical condition listed as is except for changes mentioned above. Thank you   Please call with questions. Electronically signed by Yris Rodriguez. ENZO Perales CNP on 12/30/2021 at 2:34 PM     I have seen ,spoken to  and examined this patient personally, independently of the nurse practitioner. I have reviewed the hospital care given to date and reviewed all pertinent labs and imaging. The plan was developed mutually at the time of the visit with the patient,  NP  and myself. I have spoken with patient, nursing staff and provided written and verbal instructions . The above note has been reviewed and I agree with the assessment, diagnosis, and treatment plan with changes made by me as follows     CARDIOLOGY ATTENDING ADDENDUM    HPI:  I have reviewed the above HPI  And agree with above   Michael Churchill is a 60 y. o.year old who and presents with had concerns including Shortness of Breath and Leg Swelling.   Chief Complaint   Patient presents with  Shortness of Breath    Leg Swelling     Interval history:  Mild sob    Physical Exam:  General:  Awake, alert, NAD  Head:normal  Eye:normal  Neck:  No JVD   Chest:  Clear to auscultation, respiration easy  Cardiovascular:  RRR S1S2  Abdomen:   nontender  Extremities:  tr edema  Pulses; palpable  Neuro: grossly normal      MEDICAL DECISION MAKING;    I agree with the above plan, which was planned by myself and discussed with NP.   Cad aguilar neg  Has Rt sided failure  Prn diuresis         Berenice Escalante MD [facility]

## 2021-12-30 NOTE — PROGRESS NOTES
99 97*   CO2 31 37*   BUN 24* 18   CREATININE 0.7 0.8   CALCIUM 8.6 8.3     Recent Labs     12/28/21 2031   AST 14*   ALT 11   BILITOT 0.5   ALKPHOS 91     No results for input(s): INR in the last 72 hours. No results for input(s): Erle Keepers in the last 72 hours. Assessment/Plan:  Active Hospital Problems    Diagnosis Date Noted    Severe malnutrition ([facility] [address] [E43] 12/30/2021    Pneumonia [J18.9] 12/29/2021   acute CHF   Bacteremia ? COPD / tobacco abuse     Plan:  Tele : no event - no fever - on room air   Cont IV ABX . Jeanne Katia blood c/s 1/4 positive for staph . . repeat blood C/S x2   Switch ABX to levaquin and Vanco .. pharmacy to dose vanco   resp therapy  Monitor BP . Jeanne Katia tends to be below 100   resp therapy   Cont lasix . . lytes balance   DVT Prophylaxis   Diet: ADULT DIET; Regular; Low Fat/Low Chol/High Fiber/YAJAIRA  Code Status: Full Code          Treatment progress and plan was d/w pt/family .     Camilo Harrison MD

## 2021-12-30 NOTE — PROGRESS NOTES
Comprehensive Nutrition Assessment    Type and Reason for Visit:  Initial (Low BMI for age)    Nutrition Recommendations/Plan:   · Encourage small more frequent meals with high calorie and protein, nutrient-rich foods. · Will send planned snacks per request   · Please document all PO intakes in I/O   · Will monitor labs, GI status, nutrition status and poc   · Obtain measured bed weight for more accurate nutritional assessment    Nutrition Assessment:  Admitted with SOB and swelling 2/2 PNA, COPD, Cor pulmonale. Pt on cardiac, YAJAIRA diet. Consumed % of more reduced meal. Pt verbalizes understanding of protein-calorie intake for healing but dislikes any oral nutrition supplements at this time. Reports being a chronic small eater but desires to get stronger and ability to be mobile. Noted tenderness of all quadrants of abdomen. Performed NFPE. Pt meets criteria for severe malnutrition. Moderate nutrition risk. Malnutrition Assessment:  Malnutrition Status:  Severe malnutrition    Context:  Chronic Illness     Findings of the 6 clinical characteristics of malnutrition:  Energy Intake:  7 - 75% or less estimated energy requirements for 1 month or longer  Weight Loss:  Unable to assess     Body Fat Loss:   (Moderate fat loss) Orbital,Triceps   Muscle Mass Loss:  7 - Severe muscle mass loss Temples (temporalis),Clavicles (pectoralis & deltoids),Thigh (quadraceps),Calf (gastrocnemius),Scapula (trapezius)  Fluid Accumulation:  1 - Mild Extremities,Generalized   Strength:  Not Performed    Estimated Daily Nutrient Needs:  Energy (kcal):  6530-5731 (30-35 kcals/kg); Weight Used for Energy Requirements:  Current     Protein (g):  64-77 (1-1.2 g/kg); Weight Used for Protein Requirements:  Ideal        Fluid (ml/day):  0190-5620 or per physician; Method Used for Fluid Requirements:  1 ml/kcal      Nutrition Related Findings: On HFNC. Denies any N/V/P. States having regular BMs. Rx: lasic, mag-ox.       Wounds: None       Current Nutrition Therapies:    ADULT DIET; Regular; Low Fat/Low Chol/High Fiber/YAJAIRA    Anthropometric Measures:  · Height: 5' 8\" (172.7 cm)  · Current Body Weight: 110 lb 0.2 oz (49.9 kg)   · Admission Body Weight:  (n/a)    · Usual Body Weight:  (limited wt hx per chart, pt reports UBW of 105-110#)     · Ideal Body Weight: 140 lbs; % Ideal Body Weight 78.6 %   · BMI: 16.7  · BMI Categories: Underweight (BMI less than 22) age over 72       Nutrition Diagnosis:   · In context of chronic illness,Severe malnutrition related to inadequate protein-energy intake,catabolic illness,increase demand for energy/nutrients,early satiety as evidenced by poor intake prior to admission,moderate loss of subcutaneous fat,severe muscle loss    Nutrition Interventions:   Food and/or Nutrient Delivery:  Continue Current Diet,Snacks (Comment)  Nutrition Education/Counseling:  Education not indicated   Coordination of Nutrition Care:  Continue to monitor while inpatient,Feeding Assistance/Environment Change,Coordination of Community Care    Goals:  Patient will consume greater than half of meals and snacks       Nutrition Monitoring and Evaluation:   Behavioral-Environmental Outcomes:  Beliefs and Attitutes   Food/Nutrient Intake Outcomes:  Food and Nutrient Intake  Physical Signs/Symptoms Outcomes:  Biochemical Data,GI Status,Meal Time Behavior,Fluid Status or Edema,Nutrition Focused Physical Findings,Weight     Discharge Planning:     Too soon to determine     Electronically signed by Filemon Jarvis RD, CHRISTIANO on 12/30/21 at 11:36 AM EST    Contact: [phone]

## 2021-12-30 NOTE — CARE COORDINATION
Reviewed chart and called pt in room to discuss discharge needs/plans. Pt lives alone, PTA she was totally independent, [facility] can assist with transportation if needed. She has a PCP and insurance that covers medications. Denies any needs at this time. CM available if any needs arise.

## 2021-12-30 NOTE — PROGRESS NOTES
Pt reporting redness and itchiness around IV site where antibiotics running. Paused at this time.  Dr. Pierre Price notified waiting on response

## 2021-12-31 LAB
ANION GAP SERPL CALCULATED.3IONS-SCNC: 2 MMOL/L (ref 4–16)
BUN BLDV-MCNC: 9 MG/DL (ref 6–23)
CALCIUM SERPL-MCNC: 7.9 MG/DL (ref 8.3–10.6)
CHLORIDE BLD-SCNC: 96 MMOL/L (ref 99–110)
CO2: 41 MMOL/L (ref 21–32)
CREAT SERPL-MCNC: 0.5 MG/DL (ref 0.6–1.1)
GFR AFRICAN AMERICAN: >60 ML/MIN/1.73M2
GFR NON-AFRICAN AMERICAN: >60 ML/MIN/1.73M2
GLUCOSE BLD-MCNC: 77 MG/DL (ref 70–99)
HCT VFR BLD CALC: 53.3 % (ref 37–47)
HEMOGLOBIN: 15.3 GM/DL (ref 12.5–16)
MAGNESIUM: 1.8 MG/DL (ref 1.8–2.4)
MCH RBC QN AUTO: 25.5 PG (ref 27–31)
MCHC RBC AUTO-ENTMCNC: 28.7 % (ref 32–36)
MCV RBC AUTO: 89 FL (ref 78–100)
PDW BLD-RTO: 14.6 % (ref 11.7–14.9)
PLATELET # BLD: 182 K/CU MM (ref 140–440)
PMV BLD AUTO: 10.5 FL (ref 7.5–11.1)
POTASSIUM SERPL-SCNC: 3.8 MMOL/L (ref 3.5–5.1)
PRO-BNP: 1001 PG/ML
RBC # BLD: 5.99 M/CU MM (ref 4.2–5.4)
SODIUM BLD-SCNC: 139 MMOL/L (ref 135–145)
WBC # BLD: 5.8 K/CU MM (ref 4–10.5)

## 2021-12-31 PROCEDURE — 6360000002 HC RX W HCPCS

## 2021-12-31 PROCEDURE — 2700000000 HC OXYGEN THERAPY PER DAY

## 2021-12-31 PROCEDURE — 83880 ASSAY OF NATRIURETIC PEPTIDE: CPT

## 2021-12-31 PROCEDURE — 83735 ASSAY OF MAGNESIUM: CPT

## 2021-12-31 PROCEDURE — 94761 N-INVAS EAR/PLS OXIMETRY MLT: CPT

## 2021-12-31 PROCEDURE — 6360000002 HC RX W HCPCS: Performed by: FAMILY MEDICINE

## 2021-12-31 PROCEDURE — 36415 COLL VENOUS BLD VENIPUNCTURE: CPT

## 2021-12-31 PROCEDURE — 94640 AIRWAY INHALATION TREATMENT: CPT

## 2021-12-31 PROCEDURE — 80048 BASIC METABOLIC PNL TOTAL CA: CPT

## 2021-12-31 PROCEDURE — 1200000000 HC SEMI PRIVATE

## 2021-12-31 PROCEDURE — 85027 COMPLETE CBC AUTOMATED: CPT

## 2021-12-31 PROCEDURE — 6370000000 HC RX 637 (ALT 250 FOR IP): Performed by: FAMILY MEDICINE

## 2021-12-31 PROCEDURE — 2580000003 HC RX 258: Performed by: FAMILY MEDICINE

## 2021-12-31 PROCEDURE — 6360000002 HC RX W HCPCS: Performed by: INTERNAL MEDICINE

## 2021-12-31 RX ORDER — PREDNISONE 20 MG/1
40 TABLET ORAL DAILY
Status: DISCONTINUED | OUTPATIENT
Start: 2021-12-31 | End: 2022-01-03 | Stop reason: HOSPADM

## 2021-12-31 RX ORDER — PREDNISONE 20 MG/1
40 TABLET ORAL DAILY
Status: DISCONTINUED | OUTPATIENT
Start: 2021-12-31 | End: 2021-12-31

## 2021-12-31 RX ADMIN — FUROSEMIDE 20 MG: 10 INJECTION, SOLUTION INTRAMUSCULAR; INTRAVENOUS at 18:46

## 2021-12-31 RX ADMIN — SODIUM CHLORIDE, PRESERVATIVE FREE 10 ML: 5 INJECTION INTRAVENOUS at 09:08

## 2021-12-31 RX ADMIN — SODIUM CHLORIDE, PRESERVATIVE FREE 10 ML: 5 INJECTION INTRAVENOUS at 22:26

## 2021-12-31 RX ADMIN — ALBUTEROL SULFATE 2 PUFF: 90 AEROSOL, METERED RESPIRATORY (INHALATION) at 11:17

## 2021-12-31 RX ADMIN — GUAIFENESIN 1200 MG: 600 TABLET, EXTENDED RELEASE ORAL at 22:24

## 2021-12-31 RX ADMIN — ENOXAPARIN SODIUM 50 MG: 100 INJECTION SUBCUTANEOUS at 09:03

## 2021-12-31 RX ADMIN — SODIUM CHLORIDE 25 ML: 9 INJECTION, SOLUTION INTRAVENOUS at 16:49

## 2021-12-31 RX ADMIN — ENOXAPARIN SODIUM 50 MG: 100 INJECTION SUBCUTANEOUS at 22:25

## 2021-12-31 RX ADMIN — SODIUM CHLORIDE: 9 INJECTION, SOLUTION INTRAVENOUS at 09:15

## 2021-12-31 RX ADMIN — MAGNESIUM OXIDE 400 MG: 400 TABLET ORAL at 22:25

## 2021-12-31 RX ADMIN — LEVOFLOXACIN 500 MG: 5 INJECTION, SOLUTION INTRAVENOUS at 16:50

## 2021-12-31 RX ADMIN — ALBUTEROL SULFATE 2 PUFF: 90 AEROSOL, METERED RESPIRATORY (INHALATION) at 22:51

## 2021-12-31 RX ADMIN — FUROSEMIDE 20 MG: 10 INJECTION, SOLUTION INTRAMUSCULAR; INTRAVENOUS at 09:03

## 2021-12-31 RX ADMIN — PREDNISONE 40 MG: 20 TABLET ORAL at 22:24

## 2021-12-31 RX ADMIN — VANCOMYCIN 750 MG: 750 INJECTION, SOLUTION INTRAVENOUS at 09:19

## 2021-12-31 RX ADMIN — SODIUM CHLORIDE, PRESERVATIVE FREE 10 ML: 5 INJECTION INTRAVENOUS at 09:04

## 2021-12-31 RX ADMIN — MAGNESIUM OXIDE 400 MG: 400 TABLET ORAL at 09:03

## 2021-12-31 RX ADMIN — GUAIFENESIN 1200 MG: 600 TABLET, EXTENDED RELEASE ORAL at 09:03

## 2021-12-31 RX ADMIN — ALBUTEROL SULFATE 2 PUFF: 90 AEROSOL, METERED RESPIRATORY (INHALATION) at 15:16

## 2021-12-31 RX ADMIN — ALBUTEROL SULFATE 2 PUFF: 90 AEROSOL, METERED RESPIRATORY (INHALATION) at 08:40

## 2021-12-31 ASSESSMENT — PAIN SCALES - GENERAL
PAINLEVEL_OUTOF10: 0

## 2021-12-31 NOTE — PROGRESS NOTES
Report given by Mercedes Mendoza RN. Pt is resting comfortably with no signs of distress at this time. Pt A/Ox4, speaking in full complete sentences. Skin warm, RR easy. Pt as on 5L [place] 97% and placed on 4L NC and now is 94%. Pt states she feels comfortable. Pt updated on current plan of care and verbalizes understanding. Bed placed at lowest position, locked, and call light in place.

## 2021-12-31 NOTE — PROGRESS NOTES
Attending Progress Note      PCP: Aldon Hashimoto, MD      Patient: Yahir Ling   Gender: female  : 1965   Age: 60 y.o. MRN: [id number]  Room  : [address] [place] [address]      Date of Admission: 2021    Chief Complaint   Patient presents with    Shortness of Breath    Leg Swelling           Subjective: breathing better . . tired         Medications:  Reviewed  Infusion Medications    sodium chloride      sodium chloride 75 mL/hr at 21 0915    sodium chloride 25 mL (21 1649)     Scheduled Medications    levofloxacin  500 mg IntraVENous Q24H    vancomycin  750 mg IntraVENous Q18H    albuterol sulfate HFA  2 puff Inhalation 4x daily    ipratropium  2 puff Inhalation 4x daily    sodium chloride flush  5-40 mL IntraVENous 2 times per day    enoxaparin  1 mg/kg SubCUTAneous BID    furosemide  20 mg IntraVENous BID    sodium chloride flush  5-40 mL IntraVENous 2 times per day    guaiFENesin  1,200 mg Oral BID    magnesium oxide  400 mg Oral BID     PRN Meds: ipratropium-albuterol, sodium chloride flush, sodium chloride, acetaminophen, ondansetron **OR** ondansetron, sodium chloride flush, sodium chloride, potassium chloride **OR** potassium alternative oral replacement **OR** potassium chloride, magnesium sulfate, promethazine **OR** ondansetron, polyethylene glycol, fleet, acetaminophen **OR** acetaminophen, zolpidem, guaiFENesin-dextromethorphan, benzonatate, calcium carbonate, lactulose, loperamide    No intake or output data in the 24 hours ending 21    Exam:  /74   Pulse 78   Temp 98.6 °F (37 °C) (Oral)   Resp 22   Ht 5' 8\" (1.727 m)   Wt 110 lb (49.9 kg)   SpO2 (!) 89%   BMI 16.73 kg/m²   General appearance: No distress,   Respiratory:  good air entry , no Rales , No wheezing, or rhonchi,  Cardiovascular: RRR, with normal S1/S2 . Abdomen : Soft, non-tender, non-distended  , normal bowel sounds. Legs : No edema bilaterally.  No DVT signs ,    Neurologic: Alert and oriented ,        Labs:   Recent Labs     12/28/21 2031 12/30/21  0632 12/31/21  1612   WBC 5.9 5.8 5.8   HGB 16.6* 15.4 15.3   HCT 55.9* 53.0* 53.3*    180 182     Recent Labs     12/28/21 2031 12/30/21  0632 12/31/21  1612    139 139   K 4.5 4.5 3.8   CL 99 97* 96*   CO2 31 37* 41*   BUN 24* 18 9   CREATININE 0.7 0.8 0.5*   CALCIUM 8.6 8.3 7.9*     Recent Labs     12/28/21 2031   AST 14*   ALT 11   BILITOT 0.5   ALKPHOS 91     No results for input(s): INR in the last 72 hours. No results for input(s): Burnice Damascus in the last 72 hours. Assessment/Plan:  Active Hospital Problems    Diagnosis Date Noted    Severe malnutrition ([facility] [address] [E43] 12/30/2021    Pneumonia [J18.9] 12/29/2021   acute CHF   Bacteremia ? COPD / tobacco abuse     Plan:  Tele : no event - no fever - on 2-4 L    Cont IV ABX . Mat Gonzalez blood c/s 1/4 positive for staph . . repeat blood C/S x2 : pending   Cont  levaquin and Vanco .. pharmacy to dose vanco   resp therapy   prednisone 40 mg P daily  Monitor BP . Mat Gonzalez tends to be below 100   resp therapy   Cont lasix . . lytes balance   DVT Prophylaxis   Diet: ADULT DIET; Regular; Low Fat/Low Chol/High Fiber/YAJAIRA  Code Status: Full Code          Treatment progress and plan was d/w pt/family .     Hannah Crocker MD

## 2021-12-31 NOTE — PROGRESS NOTES
BP 87/58 (68). Pt A/Ox4, speaking in full, clear sentences. Pt asymptomatic to hypotension, denies SOB, dizziness, and does not appear to be lethargic. Pt responsive to verbal stimuli.  MD perfect served and awaiting orders per MD.

## 2022-01-01 LAB
ANION GAP SERPL CALCULATED.3IONS-SCNC: 7 MMOL/L (ref 4–16)
BUN BLDV-MCNC: 8 MG/DL (ref 6–23)
CALCIUM SERPL-MCNC: 8.1 MG/DL (ref 8.3–10.6)
CHLORIDE BLD-SCNC: 98 MMOL/L (ref 99–110)
CO2: 35 MMOL/L (ref 21–32)
CREAT SERPL-MCNC: 0.5 MG/DL (ref 0.6–1.1)
CULTURE: ABNORMAL
CULTURE: ABNORMAL
CULTURE: NORMAL
DOSE AMOUNT: NORMAL
DOSE TIME: NORMAL
GFR AFRICAN AMERICAN: >60 ML/MIN/1.73M2
GFR NON-AFRICAN AMERICAN: >60 ML/MIN/1.73M2
GLUCOSE BLD-MCNC: 124 MG/DL (ref 70–99)
HCT VFR BLD CALC: 56.9 % (ref 37–47)
HEMOGLOBIN: 16.1 GM/DL (ref 12.5–16)
Lab: ABNORMAL
Lab: NORMAL
MCH RBC QN AUTO: 25 PG (ref 27–31)
MCHC RBC AUTO-ENTMCNC: 28.3 % (ref 32–36)
MCV RBC AUTO: 88.2 FL (ref 78–100)
PDW BLD-RTO: 14.7 % (ref 11.7–14.9)
PLATELET # BLD: 175 K/CU MM (ref 140–440)
PMV BLD AUTO: 10.5 FL (ref 7.5–11.1)
POTASSIUM SERPL-SCNC: 4.2 MMOL/L (ref 3.5–5.1)
RBC # BLD: 6.45 M/CU MM (ref 4.2–5.4)
SODIUM BLD-SCNC: 140 MMOL/L (ref 135–145)
SPECIMEN: ABNORMAL
SPECIMEN: NORMAL
VANCOMYCIN RANDOM: 5.8 UG/ML
WBC # BLD: 5.5 K/CU MM (ref 4–10.5)

## 2022-01-01 PROCEDURE — 6360000002 HC RX W HCPCS: Performed by: FAMILY MEDICINE

## 2022-01-01 PROCEDURE — 6360000002 HC RX W HCPCS: Performed by: INTERNAL MEDICINE

## 2022-01-01 PROCEDURE — 6370000000 HC RX 637 (ALT 250 FOR IP): Performed by: FAMILY MEDICINE

## 2022-01-01 PROCEDURE — 2500000003 HC RX 250 WO HCPCS: Performed by: FAMILY MEDICINE

## 2022-01-01 PROCEDURE — 80048 BASIC METABOLIC PNL TOTAL CA: CPT

## 2022-01-01 PROCEDURE — 6360000002 HC RX W HCPCS

## 2022-01-01 PROCEDURE — 2580000003 HC RX 258: Performed by: FAMILY MEDICINE

## 2022-01-01 PROCEDURE — 94664 DEMO&/EVAL PT USE INHALER: CPT

## 2022-01-01 PROCEDURE — 2700000000 HC OXYGEN THERAPY PER DAY

## 2022-01-01 PROCEDURE — 94640 AIRWAY INHALATION TREATMENT: CPT

## 2022-01-01 PROCEDURE — 80202 ASSAY OF VANCOMYCIN: CPT

## 2022-01-01 PROCEDURE — 1200000000 HC SEMI PRIVATE

## 2022-01-01 PROCEDURE — 94761 N-INVAS EAR/PLS OXIMETRY MLT: CPT

## 2022-01-01 PROCEDURE — 36415 COLL VENOUS BLD VENIPUNCTURE: CPT

## 2022-01-01 PROCEDURE — 85027 COMPLETE CBC AUTOMATED: CPT

## 2022-01-01 RX ADMIN — GUAIFENESIN 1200 MG: 600 TABLET, EXTENDED RELEASE ORAL at 11:50

## 2022-01-01 RX ADMIN — ZOLPIDEM TARTRATE 5 MG: 5 TABLET ORAL at 22:37

## 2022-01-01 RX ADMIN — PREDNISONE 40 MG: 20 TABLET ORAL at 11:50

## 2022-01-01 RX ADMIN — SODIUM CHLORIDE, PRESERVATIVE FREE 10 ML: 5 INJECTION INTRAVENOUS at 09:00

## 2022-01-01 RX ADMIN — ENOXAPARIN SODIUM 50 MG: 100 INJECTION SUBCUTANEOUS at 22:36

## 2022-01-01 RX ADMIN — MAGNESIUM OXIDE 400 MG: 400 TABLET ORAL at 22:36

## 2022-01-01 RX ADMIN — SODIUM CHLORIDE: 9 INJECTION, SOLUTION INTRAVENOUS at 22:43

## 2022-01-01 RX ADMIN — ALBUTEROL SULFATE 2 PUFF: 90 AEROSOL, METERED RESPIRATORY (INHALATION) at 07:54

## 2022-01-01 RX ADMIN — LEVOFLOXACIN 500 MG: 5 INJECTION, SOLUTION INTRAVENOUS at 17:39

## 2022-01-01 RX ADMIN — FUROSEMIDE 20 MG: 10 INJECTION, SOLUTION INTRAMUSCULAR; INTRAVENOUS at 11:50

## 2022-01-01 RX ADMIN — VANCOMYCIN 750 MG: 750 INJECTION, SOLUTION INTRAVENOUS at 04:05

## 2022-01-01 RX ADMIN — ENOXAPARIN SODIUM 50 MG: 100 INJECTION SUBCUTANEOUS at 11:50

## 2022-01-01 RX ADMIN — MAGNESIUM OXIDE 400 MG: 400 TABLET ORAL at 11:50

## 2022-01-01 RX ADMIN — ALBUTEROL SULFATE 2 PUFF: 90 AEROSOL, METERED RESPIRATORY (INHALATION) at 13:48

## 2022-01-01 RX ADMIN — GUAIFENESIN 1200 MG: 600 TABLET, EXTENDED RELEASE ORAL at 22:35

## 2022-01-01 RX ADMIN — SODIUM CHLORIDE: 9 INJECTION, SOLUTION INTRAVENOUS at 06:56

## 2022-01-01 RX ADMIN — FUROSEMIDE 20 MG: 10 INJECTION, SOLUTION INTRAMUSCULAR; INTRAVENOUS at 17:38

## 2022-01-01 ASSESSMENT — PAIN SCALES - GENERAL
PAINLEVEL_OUTOF10: 0
PAINLEVEL_OUTOF10: 0

## 2022-01-01 NOTE — PROGRESS NOTES
01/01/22 0755   Oxygen Therapy/Pulse Ox   O2 Therapy Oxygen   $Oxygen $Daily Charge   O2 Device Nasal cannula   O2 Flow Rate (L/min) 5 L/min   Resp 19   SpO2 94 %   $Pulse Oximeter $Spot check (multiple/continuous)   Blood Gas  Performed?  No   decreased to 4L

## 2022-01-02 LAB
ANION GAP SERPL CALCULATED.3IONS-SCNC: 4 MMOL/L (ref 4–16)
BUN BLDV-MCNC: 10 MG/DL (ref 6–23)
CALCIUM SERPL-MCNC: 8.1 MG/DL (ref 8.3–10.6)
CHLORIDE BLD-SCNC: 102 MMOL/L (ref 99–110)
CO2: 35 MMOL/L (ref 21–32)
CREAT SERPL-MCNC: 0.5 MG/DL (ref 0.6–1.1)
CULTURE: NORMAL
GFR AFRICAN AMERICAN: >60 ML/MIN/1.73M2
GFR NON-AFRICAN AMERICAN: >60 ML/MIN/1.73M2
GLUCOSE BLD-MCNC: 93 MG/DL (ref 70–99)
HCT VFR BLD CALC: 53 % (ref 37–47)
HEMOGLOBIN: 15.6 GM/DL (ref 12.5–16)
Lab: NORMAL
MCH RBC QN AUTO: 25.7 PG (ref 27–31)
MCHC RBC AUTO-ENTMCNC: 29.4 % (ref 32–36)
MCV RBC AUTO: 87.5 FL (ref 78–100)
PDW BLD-RTO: 14.6 % (ref 11.7–14.9)
PLATELET # BLD: 173 K/CU MM (ref 140–440)
PMV BLD AUTO: 11.2 FL (ref 7.5–11.1)
POTASSIUM SERPL-SCNC: 3.8 MMOL/L (ref 3.5–5.1)
RBC # BLD: 6.06 M/CU MM (ref 4.2–5.4)
SODIUM BLD-SCNC: 141 MMOL/L (ref 135–145)
SPECIMEN: NORMAL
WBC # BLD: 6.5 K/CU MM (ref 4–10.5)

## 2022-01-02 PROCEDURE — 2700000000 HC OXYGEN THERAPY PER DAY

## 2022-01-02 PROCEDURE — 2580000003 HC RX 258: Performed by: FAMILY MEDICINE

## 2022-01-02 PROCEDURE — 1200000000 HC SEMI PRIVATE

## 2022-01-02 PROCEDURE — 94761 N-INVAS EAR/PLS OXIMETRY MLT: CPT

## 2022-01-02 PROCEDURE — 80048 BASIC METABOLIC PNL TOTAL CA: CPT

## 2022-01-02 PROCEDURE — 85027 COMPLETE CBC AUTOMATED: CPT

## 2022-01-02 PROCEDURE — 94640 AIRWAY INHALATION TREATMENT: CPT

## 2022-01-02 PROCEDURE — 6370000000 HC RX 637 (ALT 250 FOR IP): Performed by: FAMILY MEDICINE

## 2022-01-02 PROCEDURE — 6360000002 HC RX W HCPCS: Performed by: INTERNAL MEDICINE

## 2022-01-02 PROCEDURE — 6360000002 HC RX W HCPCS

## 2022-01-02 PROCEDURE — 6360000002 HC RX W HCPCS: Performed by: FAMILY MEDICINE

## 2022-01-02 PROCEDURE — 36415 COLL VENOUS BLD VENIPUNCTURE: CPT

## 2022-01-02 RX ORDER — METHYLPREDNISOLONE 4 MG/1
TABLET ORAL
Qty: 1 KIT | Refills: 0 | Status: SHIPPED | OUTPATIENT
Start: 2022-01-02 | End: 2022-01-08

## 2022-01-02 RX ORDER — POTASSIUM CHLORIDE 20 MEQ/1
10 TABLET, EXTENDED RELEASE ORAL DAILY
Qty: 30 TABLET | Refills: 1 | Status: SHIPPED | OUTPATIENT
Start: 2022-01-02

## 2022-01-02 RX ORDER — ALBUTEROL SULFATE 90 UG/1
2 AEROSOL, METERED RESPIRATORY (INHALATION) EVERY 4 HOURS PRN
Qty: 18 G | Refills: 5 | Status: SHIPPED | OUTPATIENT
Start: 2022-01-02

## 2022-01-02 RX ORDER — LEVOFLOXACIN 500 MG/1
500 TABLET, FILM COATED ORAL DAILY
Qty: 7 TABLET | Refills: 0 | Status: SHIPPED | OUTPATIENT
Start: 2022-01-02 | End: 2022-01-09

## 2022-01-02 RX ORDER — FUROSEMIDE 20 MG/1
20 TABLET ORAL DAILY
Qty: 60 TABLET | Refills: 3 | Status: SHIPPED | OUTPATIENT
Start: 2022-01-02

## 2022-01-02 RX ADMIN — MAGNESIUM OXIDE 400 MG: 400 TABLET ORAL at 09:29

## 2022-01-02 RX ADMIN — ALBUTEROL SULFATE 2 PUFF: 90 AEROSOL, METERED RESPIRATORY (INHALATION) at 09:06

## 2022-01-02 RX ADMIN — VANCOMYCIN HYDROCHLORIDE 1000 MG: 1 INJECTION, POWDER, LYOPHILIZED, FOR SOLUTION INTRAVENOUS at 00:11

## 2022-01-02 RX ADMIN — ALBUTEROL SULFATE 2 PUFF: 90 AEROSOL, METERED RESPIRATORY (INHALATION) at 17:36

## 2022-01-02 RX ADMIN — LEVOFLOXACIN 500 MG: 5 INJECTION, SOLUTION INTRAVENOUS at 17:11

## 2022-01-02 RX ADMIN — ENOXAPARIN SODIUM 50 MG: 100 INJECTION SUBCUTANEOUS at 20:40

## 2022-01-02 RX ADMIN — SODIUM CHLORIDE: 9 INJECTION, SOLUTION INTRAVENOUS at 13:26

## 2022-01-02 RX ADMIN — ALBUTEROL SULFATE 2 PUFF: 90 AEROSOL, METERED RESPIRATORY (INHALATION) at 21:25

## 2022-01-02 RX ADMIN — VANCOMYCIN HYDROCHLORIDE 1000 MG: 1 INJECTION, POWDER, LYOPHILIZED, FOR SOLUTION INTRAVENOUS at 19:09

## 2022-01-02 RX ADMIN — PREDNISONE 40 MG: 20 TABLET ORAL at 09:29

## 2022-01-02 RX ADMIN — GUAIFENESIN 1200 MG: 600 TABLET, EXTENDED RELEASE ORAL at 20:40

## 2022-01-02 RX ADMIN — FUROSEMIDE 20 MG: 10 INJECTION, SOLUTION INTRAMUSCULAR; INTRAVENOUS at 09:28

## 2022-01-02 RX ADMIN — ENOXAPARIN SODIUM 50 MG: 100 INJECTION SUBCUTANEOUS at 09:28

## 2022-01-02 RX ADMIN — ALBUTEROL SULFATE 2 PUFF: 90 AEROSOL, METERED RESPIRATORY (INHALATION) at 12:51

## 2022-01-02 RX ADMIN — MAGNESIUM OXIDE 400 MG: 400 TABLET ORAL at 20:40

## 2022-01-02 RX ADMIN — GUAIFENESIN 1200 MG: 600 TABLET, EXTENDED RELEASE ORAL at 09:29

## 2022-01-02 RX ADMIN — FUROSEMIDE 20 MG: 10 INJECTION, SOLUTION INTRAMUSCULAR; INTRAVENOUS at 17:12

## 2022-01-02 RX ADMIN — SODIUM CHLORIDE, PRESERVATIVE FREE 10 ML: 5 INJECTION INTRAVENOUS at 09:29

## 2022-01-02 ASSESSMENT — PAIN SCALES - GENERAL
PAINLEVEL_OUTOF10: 0

## 2022-01-02 NOTE — PROGRESS NOTES
Pt reports baseline O2 being 88-92% at home due to COPD history. Pt states continuous 02 @ 4L takes her breath away and irritates nostrils, 02 removed per request, pt 02 reading 92% on room air, pt states she feels much better. To notify this RN if any issues with shortness of breath. Call light in reach.

## 2022-01-02 NOTE — PROGRESS NOTES
Pt often removes o2 and states she does not wear it at home. sats range from 80-89  Education provided and o2 placed back on pt at this time.  Dr. Lio Carty per secured message

## 2022-01-02 NOTE — PROGRESS NOTES
1949 [facility]  consulted by Dr. Jayne Yeager for monitoring and adjustment. Indication for treatment: Pneumonia  Goal trough: 15-20 mcg/mL  AUC/JOSE: 400-600    Pertinent Laboratory Values:   Temp Readings from Last 3 Encounters:   01/01/22 97.9 °F (36.6 °C) (Oral)   03/03/17 98.4 °F (36.9 °C) (Oral)     Recent Labs     12/30/21  0632 12/31/21  1612 01/01/22  0334   WBC 5.8 5.8 5.5     Recent Labs     12/30/21  0632 12/31/21  1612 01/01/22  0334   BUN 18 9 8   CREATININE 0.8 0.5* 0.5*     Estimated Creatinine Clearance: 97 mL/min (A) (based on SCr of 0.5 mg/dL (L)). Intake/Output Summary (Last 24 hours) at 1/1/2022 1902  Last data filed at 1/1/2022 0900  Gross per 24 hour   Intake 10 ml   Output --   Net 10 ml       Pertinent Cultures:  Date    Source    Results  12/30   MRSA Screen   Ordered  12/28   Blood    Staph species (1 of 4)   12/30   Blood    Sent     Vancomycin level:   TROUGH:  No results for input(s): VANCOTROUGH in the last 72 hours.   RANDOM:    Recent Labs     01/01/22  0334   VANCORANDOM 5.8       Assessment:  · WBC and temperature: WBC WNL/Afebrile  · SCr, BUN, and urine output: Stable   · Day(s) of therapy: 2  · Vancomycin concentration: 5.8, sub-therapeutic    Plan:  · Increase dose to 1000 mg IVPB q18h  · Predicted trough: 12.9,   · Repeat level in 72h  · Pharmacy will continue to monitor patient and adjust therapy as indicated    Linette 3 01/03/22 @0600    Thank you for the consult,  Sandhya Pablo, [facility]  1/1/2022 7:02 PM

## 2022-01-02 NOTE — PROGRESS NOTES
Dr notified this RN that pt cannot discharge until tomorrow due to home o2 orders.  Pt notified by dr. Yovanny Scott

## 2022-01-02 NOTE — PROGRESS NOTES
Attending Progress Note      PCP: Eva Moya MD      Patient: Gypsy Douglas   Gender: female  : 1965   Age: 60 y.o. MRN: [id number]  Room  : [address] [place] [address]      Date of Admission: 2021    Chief Complaint   Patient presents with    Shortness of Breath    Leg Swelling           Subjective: breathing better . .weak . Medications:  Reviewed  Infusion Medications    sodium chloride      sodium chloride 75 mL/hr at 22 1326    sodium chloride 25 mL (21 1649)     Scheduled Medications    vancomycin  1,000 mg IntraVENous Q18H    predniSONE  40 mg Oral Daily    levofloxacin  500 mg IntraVENous Q24H    albuterol sulfate HFA  2 puff Inhalation 4x daily    ipratropium  2 puff Inhalation 4x daily    sodium chloride flush  5-40 mL IntraVENous 2 times per day    enoxaparin  1 mg/kg SubCUTAneous BID    furosemide  20 mg IntraVENous BID    guaiFENesin  1,200 mg Oral BID    magnesium oxide  400 mg Oral BID     PRN Meds: ipratropium-albuterol, sodium chloride flush, sodium chloride, acetaminophen, ondansetron **OR** ondansetron, sodium chloride flush, sodium chloride, potassium chloride **OR** potassium alternative oral replacement **OR** potassium chloride, magnesium sulfate, promethazine **OR** ondansetron, polyethylene glycol, fleet, acetaminophen **OR** acetaminophen, zolpidem, guaiFENesin-dextromethorphan, benzonatate, calcium carbonate, lactulose, loperamide    No intake or output data in the 24 hours ending 22 1617    Exam:  /62   Pulse 80   Temp 98.7 °F (37.1 °C) (Oral)   Resp 14   Ht 5' 8\" (1.727 m)   Wt 110 lb (49.9 kg)   SpO2 (!) 89%   BMI 16.73 kg/m²   General appearance: No distress,   Respiratory:  good air entry , no Rales , No wheezing, or rhonchi,  Cardiovascular: RRR, with normal S1/S2 . Abdomen : Soft, non-tender, non-distended  , normal bowel sounds. Legs : No edema bilaterally.  No DVT signs ,    Neurologic:  Alert and oriented ,        Labs:   Recent Labs     12/31/21  1612 01/01/22  0334 01/02/22  0554   WBC 5.8 5.5 6.5   HGB 15.3 16.1* 15.6   HCT 53.3* 56.9* 53.0*    175 173     Recent Labs     12/31/21  1612 01/01/22  0334 01/02/22  0554    140 141   K 3.8 4.2 3.8   CL 96* 98* 102   CO2 41* 35* 35*   BUN 9 8 10   CREATININE 0.5* 0.5* 0.5*   CALCIUM 7.9* 8.1* 8.1*     No results for input(s): AST, ALT, BILIDIR, BILITOT, ALKPHOS in the last 72 hours. No results for input(s): INR in the last 72 hours. No results for input(s): Daniel Whiteer in the last 72 hours. Assessment/Plan:  Active Hospital Problems    Diagnosis Date Noted    Severe malnutrition ([facility] [address] [E43] 12/30/2021    Pneumonia [J18.9] 12/29/2021   acute CHF   Bacteremia ? COPD / tobacco abuse     Plan:  [facility]   Tele : no event - no fever - on 2-4 l o2    Cont IV ABX . Angela Kid blood c/s 1/4 positive for staph . . repeat blood C/S x2    ABX to levaquin and Vanco .. pharmacy to dose vanco   resp therapy   prednisone 40 mg P daily  Monitor BP . Angela Kid tends to be below 100   resp therapy   Cont lasix . . lytes balance   DVT Prophylaxis   Diet: ADULT DIET; Regular; Low Fat/Low Chol/High Fiber/YAJAIRA  Code Status: Full Code          Treatment progress and plan was d/w pt/family .     Bobbi Osler, MD

## 2022-01-03 VITALS
TEMPERATURE: 98.1 F | SYSTOLIC BLOOD PRESSURE: 124 MMHG | WEIGHT: 110 LBS | RESPIRATION RATE: 22 BRPM | HEIGHT: 68 IN | OXYGEN SATURATION: 90 % | DIASTOLIC BLOOD PRESSURE: 80 MMHG | BODY MASS INDEX: 16.67 KG/M2 | HEART RATE: 83 BPM

## 2022-01-03 LAB
ANION GAP SERPL CALCULATED.3IONS-SCNC: 7 MMOL/L (ref 4–16)
BUN BLDV-MCNC: 11 MG/DL (ref 6–23)
CALCIUM SERPL-MCNC: 8.2 MG/DL (ref 8.3–10.6)
CHLORIDE BLD-SCNC: 101 MMOL/L (ref 99–110)
CO2: 33 MMOL/L (ref 21–32)
CREAT SERPL-MCNC: 0.5 MG/DL (ref 0.6–1.1)
DOSE AMOUNT: NORMAL
DOSE TIME: NORMAL
GFR AFRICAN AMERICAN: >60 ML/MIN/1.73M2
GFR NON-AFRICAN AMERICAN: >60 ML/MIN/1.73M2
GLUCOSE BLD-MCNC: 85 MG/DL (ref 70–99)
HCT VFR BLD CALC: 54.7 % (ref 37–47)
HEMOGLOBIN: 16.2 GM/DL (ref 12.5–16)
MCH RBC QN AUTO: 25.8 PG (ref 27–31)
MCHC RBC AUTO-ENTMCNC: 29.6 % (ref 32–36)
MCV RBC AUTO: 87.2 FL (ref 78–100)
PDW BLD-RTO: 14.8 % (ref 11.7–14.9)
PLATELET # BLD: 170 K/CU MM (ref 140–440)
PMV BLD AUTO: 11.2 FL (ref 7.5–11.1)
POTASSIUM SERPL-SCNC: 3.7 MMOL/L (ref 3.5–5.1)
RBC # BLD: 6.27 M/CU MM (ref 4.2–5.4)
SODIUM BLD-SCNC: 141 MMOL/L (ref 135–145)
VANCOMYCIN RANDOM: 10 UG/ML
WBC # BLD: 6.1 K/CU MM (ref 4–10.5)

## 2022-01-03 PROCEDURE — 80048 BASIC METABOLIC PNL TOTAL CA: CPT

## 2022-01-03 PROCEDURE — 80202 ASSAY OF VANCOMYCIN: CPT

## 2022-01-03 PROCEDURE — 6370000000 HC RX 637 (ALT 250 FOR IP): Performed by: FAMILY MEDICINE

## 2022-01-03 PROCEDURE — 6360000002 HC RX W HCPCS: Performed by: INTERNAL MEDICINE

## 2022-01-03 PROCEDURE — 94618 PULMONARY STRESS TESTING: CPT

## 2022-01-03 PROCEDURE — 6360000002 HC RX W HCPCS

## 2022-01-03 PROCEDURE — 36415 COLL VENOUS BLD VENIPUNCTURE: CPT

## 2022-01-03 PROCEDURE — 2580000003 HC RX 258: Performed by: FAMILY MEDICINE

## 2022-01-03 PROCEDURE — 85027 COMPLETE CBC AUTOMATED: CPT

## 2022-01-03 RX ADMIN — PREDNISONE 40 MG: 20 TABLET ORAL at 10:24

## 2022-01-03 RX ADMIN — GUAIFENESIN 1200 MG: 600 TABLET, EXTENDED RELEASE ORAL at 10:22

## 2022-01-03 RX ADMIN — FUROSEMIDE 20 MG: 10 INJECTION, SOLUTION INTRAMUSCULAR; INTRAVENOUS at 10:59

## 2022-01-03 RX ADMIN — ALBUTEROL SULFATE 2 PUFF: 90 AEROSOL, METERED RESPIRATORY (INHALATION) at 10:57

## 2022-01-03 RX ADMIN — ENOXAPARIN SODIUM 50 MG: 100 INJECTION SUBCUTANEOUS at 10:20

## 2022-01-03 RX ADMIN — SODIUM CHLORIDE, PRESERVATIVE FREE 10 ML: 5 INJECTION INTRAVENOUS at 10:27

## 2022-01-03 RX ADMIN — MAGNESIUM OXIDE 400 MG: 400 TABLET ORAL at 10:23

## 2022-01-03 RX ADMIN — SODIUM CHLORIDE: 9 INJECTION, SOLUTION INTRAVENOUS at 05:38

## 2022-01-03 ASSESSMENT — PAIN SCALES - GENERAL: PAINLEVEL_OUTOF10: 0

## 2022-01-03 NOTE — PROGRESS NOTES
Contact respiratory on status of home O2. [place] was delivered to room. Respiratory went over equipment. Removed IV. Went over AVS with Hai Fontenot, she verbalized understanding. She wants to set up her follow up appointment. Pt. Connected to her portable O2. Taken to front door via wheel chair.

## 2022-01-03 NOTE — PROGRESS NOTES
1/3/2022 10:46 AM  Patient Room #: 0537/7032-T  Patient Name: Nancy Trevino    (Step 1 Done by RN if possible otherwise call Pulmonary Diagnostics)  1. Place patient on room air at rest for at least 30 minutes. If patient falls below 88% before 30 minutes then you can record the level and stop. Record room air saturation level _87_ %. If patient is at 88% or below, they will qualify for home oxygen and you can stop. If level does not fall below 88%, fill in level above. If indicated continue to Step 2. Signature:_Patricia Guzman, RRT__ Date: _01/03/0021__  (Step 2&3 Done by P)  2. Ambulate patient on room air until saturation falls below 89%. Record level of room air saturation with ambulation___ %. Next, place patient back on ___lpm oxygen and ambulate, record level __%. (Note:  this level must show improvement from room air level done with ambulation.)  If patients saturation on room air with ambulation is 88% or below AND patient shows improvement with oxygen during ambulation, they will qualify for home oxygen and you can stop. If patient does not drop below 89%, then patient should have an overnight oximetry trending on room air to see if level falls below 88%. Complete level in Step 3 below. 3. Room air overnight oximetry level 88 % for___  cumulative minutes. If patients room air oxygen level is < 89% for at least 5 cumulative minutes, patient will qualify for home oxygen and you can stop. (Attach Night Trending Report)    Complete order below: Diagnosis:_Centrilobular Emphysema___  Home oxygen at:  Length of Need: X Lifetime ?  3 Months     _2__lpm or __%   via  [x] nasal cannula  []mask  [] other: Conserving Device         [x]continuous [x]  with activity  [x]  Nocturnal   [x] Portable Tanks [x]  Concentrator  [x] Conserving Device        Therapist Signature:_Patricia Guzman, RRT__     Date:  __01/03/2021_  Physician Signature:  __Electronically Signed in EMR_ Date:___  Ordering User: Corbin Vidales MD  Provider ID: [id number]  NPI:  [id number]  [x] Patient Qualifies      [] Patient Does NOT qualify

## 2022-01-03 NOTE — PROGRESS NOTES
Doctor Please copy and paste below in your progress note per DME requirements. Patient was seen in hospital for   Centrilobular Emphysema   . I am prescribing oxygen because the diagnosis and testing requires the patient to have oxygen in the home. Conditions will improve or be benefited by oxygen use. The patient is able to perform good mobility and therefore requires the use of a portable oxygen system for ambulation.

## 2022-01-03 NOTE — PROGRESS NOTES
,        Labs:   Recent Labs     12/31/21  1612 01/01/22  0334 01/02/22  0554   WBC 5.8 5.5 6.5   HGB 15.3 16.1* 15.6   HCT 53.3* 56.9* 53.0*    175 173     Recent Labs     12/31/21  1612 01/01/22  0334 01/02/22  0554    140 141   K 3.8 4.2 3.8   CL 96* 98* 102   CO2 41* 35* 35*   BUN 9 8 10   CREATININE 0.5* 0.5* 0.5*   CALCIUM 7.9* 8.1* 8.1*     No results for input(s): AST, ALT, BILIDIR, BILITOT, ALKPHOS in the last 72 hours. No results for input(s): INR in the last 72 hours. No results for input(s): Bennetta Downy in the last 72 hours. Assessment/Plan:  Active Hospital Problems    Diagnosis Date Noted    Severe malnutrition ([facility] [address] [E43] 12/30/2021    Pneumonia [J18.9] 12/29/2021   acute CHF   Bacteremia ? COPD / tobacco abuse     Plan:  Plan for discharge today . Manuel Rodriguez but canceled as no O2 delivery  today   Tele : no event - no fever - on 2-4 l o2    Cont IV ABX . Manuel Rodriguez blood c/s 1/4 positive for staph . . repeat blood C/S x2    ABX to levaquin and Vanco .. pharmacy to dose vanco   resp therapy   prednisone 40 mg P daily  Monitor BP . Manuel Rodriguez tends to be below 100   resp therapy   Cont lasix . . lytes balance   DVT Prophylaxis   Diet: ADULT DIET; Regular; Low Fat/Low Chol/High Fiber/YAJAIRA  Code Status: Full Code          Treatment progress and plan was d/w pt/family .     Angelita Arevalo MD

## 2022-01-03 NOTE — PROGRESS NOTES
Patient is here for pneumonia. SpO2 91% on 2 lpm.  0935 removed O2. While sitting up in bed on room air SpO2 87% at 0938. Oxygen placed back on patient. SpO2 back up to 91% with 2 lpm.  Dr. Angie Ortez notified via PS. Patient updated on what steps will happen next. She verbalized understanding.

## 2022-01-03 NOTE — DISCHARGE SUMMARY
Patient: Segundo Murphy MD      Gender: female  : 1965   Age: 60 y.o. MRN: [id number]    Admitting Physician: Olegario Agarwal MD  Discharge Physician: Olegario Agarwal MD     Code Status: Full Code     Admit Date: 2021   Discharge Date: 22      Disposition:  Home       Condition at Discharge:  stable . Follow-up appointments:  f/u one week with PCP , and with consultants as recommended . Outpatient to do list: f/u 4 mm nodule in the right upper lobe/ CT in 12 months     Pt`s preferred phone number :[phone]  [facility]  Extended Emergency Contact Information  Primary Emergency Contact: Major Shea, [address] of [address] [place] [address] Phone: [phone]  Relation: Child  Secondary Emergency Contact: [address] [facility], [address] Phone: [phone]  Relation: Child      Discharge Diagnoses: Active Hospital Problems    Diagnosis     Severe malnutrition ([address] [E43]     Pneumonia [J18.9]    acute  CHF   Acute COPD exacerbation   Acute on chronic respiratory failure with hypoxia    Bacteremia ? Cor pulmonal  D-shaped septum indicative of right ventricular volume overload. Severely dilated and hypokinetic right ventricle  Dilated hypokinetic RV.   Cardiomyopathy   COPD  Chronic resp failure with hypoxia   Tobacco abuse   Bibasilar airspace opacities right worse than left suspicious for multifocal pneumonia. Small right pleural effusion. 4 mm nodule in the right upper lobe    History of Present Illness:    Nazanin Solares is a 60 y.o. female with   has a past medical history of COPD (chronic obstructive pulmonary disease) ([address] who presented to ER with cough and SOB and leg swelling . Hospital Course:   Tele  : no event - no fever - on 2-4 l o2   [facility] . [facility]  positive for staph . . repeat blood C/S x2 : negative   ABX to levaquin and Vanco .. pharmacy to dose vanco   resp therapy   prednisone 40 mg P daily Monitor BP . Gweneth Latch tends to be below 100   Cardiac markers, stress test , cardiology consult   Home meds , reviewed and resumed as appropriate   Symptoms releif/Pain control  DVT proph           Consults. IP CONSULT TO PRIMARY CARE PROVIDER  IP CONSULT TO CARDIOLOGY  IP CONSULT TO PHARMACY        Discharge Medications:   Current Discharge Medication List      START taking these medications    Details   !! albuterol sulfate  (90 Base) MCG/ACT inhaler Inhale 2 puffs into the lungs every 4 hours as needed for Wheezing or Shortness of Breath  Qty: 18 g, Refills: 5      furosemide (LASIX) 20 MG tablet Take 1 tablet by mouth daily  Qty: 60 tablet, Refills: 3      methylPREDNISolone (MEDROL DOSEPACK) 4 MG tablet Take by mouth. Qty: 1 kit, Refills: 0      potassium chloride (KLOR-CON M) 20 MEQ extended release tablet Take 0.5 tablets by mouth daily  Qty: 30 tablet, Refills: 1      levoFLOXacin (LEVAQUIN) 500 MG tablet Take 1 tablet by mouth daily for 7 days  Qty: 7 tablet, Refills: 0       !! - Potential duplicate medications found. Please discuss with provider. Current Discharge Medication List        Current Discharge Medication List      CONTINUE these medications which have NOT CHANGED    Details   !! VENTOLIN  (90 Base) MCG/ACT inhaler Inhale 2 puffs into the lungs 4 times daily  Qty: 1 Inhaler, Refills: 3      BREO ELLIPTA 100-25 MCG/INH AEPB inhaler Refills: 0      omeprazole (PRILOSEC) 10 MG delayed release capsule Take 10 mg by mouth daily      SPIRIVA RESPIMAT 1.25 MCG/ACT AERS inhaler inhale 2 puffs INTO THE LUNGS once daily  Qty: 4 g, Refills: 5      aspirin 81 MG tablet Take 81 mg by mouth daily      lidocaine (LIDODERM) 5 % apply 1 patch to the affected area daily. Leave on for 12 hours and then off for 12 hours.   Refills: 0      atorvastatin (LIPITOR) 40 MG tablet take 1 tablet by mouth once daily  Refills: 0      MUCINEX 600 MG extended release tablet take 1 tablet by mouth twice a day  Qty: 60 tablet, Refills: 5      cetirizine (ZYRTEC ALLERGY) 10 MG tablet Take 1 tablet by mouth daily  Qty: 30 tablet, Refills: 5       !! - Potential duplicate medications found. Please discuss with provider. Current Discharge Medication List      STOP taking these medications       naproxen (NAPROSYN) 250 MG tablet Comments:   Reason for Stopping:         ALBUTEROL SULFATE IN Comments:   Reason for Stopping:         ibuprofen (IBU) 800 MG tablet Comments:   Reason for Stopping:               Discharge ROS:  A complete review of systems was asked and negative . Discharge Exam:  Estimated body mass index is 16.73 kg/m² as calculated from the following:    Height as of this encounter: 5' 8\" (1.727 m). Weight as of this encounter: 110 lb (49.9 kg). /80   Pulse 83   Temp 98.1 °F (36.7 °C) (Oral)   Resp 22   Ht 5' 8\" (1.727 m)   Wt 110 lb (49.9 kg)   SpO2 90%   BMI 16.73 kg/m²   General appearance:  NAD  Heart[de-identified] Normal s1/s2, RRR, no murmurs, gallops, or rubs. No leg edema  Lungs:  Clear to auscultation, bilaterally without Rales/Wheezes/Rhonchi. Abdomen: Soft, non-tender, non-distended, bowel sounds present  Musculoskeletal:   no cyanosis, no edema  Neurologic:  Cranial nerves: II-XII intact, grossly non-focal.  Psychiatric:  A & O x3      Labs:  For convenience and continuity at follow-up the following most recent labs are provided:    Lab Results   Component Value Date    WBC 6.1 01/03/2022    HGB 16.2 01/03/2022    HCT 54.7 01/03/2022    MCV 87.2 01/03/2022     01/03/2022     01/03/2022    K 3.7 01/03/2022     01/03/2022    CO2 33 01/03/2022    BUN 11 01/03/2022    CREATININE 0.5 01/03/2022    CALCIUM 8.2 01/03/2022    ALKPHOS 91 12/28/2021    ALT 11 12/28/2021    AST 14 12/28/2021    BILITOT 0.5 12/28/2021    BILIDIR 0.2 03/26/2019    LABALBU 3.3 12/28/2021    LDLCALC 181 03/16/2018    TRIG 67 03/26/2019     No results found for: INR    Results for orders placed or performed during the hospital encounter of 12/28/21   Culture, Blood 1    Specimen: Blood   Result Value Ref Range    Specimen BLOOD     Special Requests       [facility],ONE OF FOUR BOTTLES POSITIVE,CALLED TO E4 CATHI DONALD ON [phone] BY MARISOL MLT  ID BY PCR      Culture Final Report     Culture (A)      STAPHYLOCOCCUS COAGULASE-NEGATIVE POSITIVE for This organism was isolated in one set. Susceptibility testing is not routinely done as this organism frequently represents skin contamination. Additional testing can be ordered by calling the Microbiology Department.    Culture, Blood 2    Specimen: Blood   Result Value Ref Range    Specimen BLOOD     Special Requests NONE     Culture NO GROWTH AT 5 DAYS    COVID-19, Rapid    Specimen: Nasopharyngeal   Result Value Ref Range    Source THROAT     SARS-CoV-2, NAAT NOT DETECTED NOT DETECTED   Respiratory Panel, Molecular, with COVID-19 (Restricted: peds pts or suitable admitted adults)    Specimen: Nasopharyngeal   Result Value Ref Range    Adenovirus Detection by PCR NOT DETECTED NOT DETECTED    Coronavirus 229E PCR NOT DETECTED NOT DETECTED    Coronavirus HKU1 PCR NOT DETECTED NOT DETECTED    Coronavirus NL63 PCR NOT DETECTED NOT DETECTED    Coronavirus OC43 PCR NOT DETECTED NOT DETECTED    SARS-CoV-2 NOT DETECTED NOT DETECTED    Human Metapneumovirus PCR NOT DETECTED NOT DETECTED    Rhinovirus Enterovirus PCR NOT DETECTED NOT DETECTED    Influenza A by PCR NOT DETECTED NOT DETECTED    Influenza A H1 Pandemic PCR NOT DETECTED NOT DETECTED    Influenza A H1 (2009) PCR NOT DETECTED NOT DETECTED    Influenza A H3 PCR NOT DETECTED NOT DETECTED    Influenza B by PCR NOT DETECTED NOT DETECTED    Parainfluenza 1 PCR NOT DETECTED NOT DETECTED    Parainfluenza 2 PCR NOT DETECTED NOT DETECTED    Parainfluenza 3 PCR NOT DETECTED NOT DETECTED    Parainfluenza 4 PCR NOT DETECTED NOT DETECTED    RSV PCR NOT DETECTED NOT DETECTED    Bordetella parapertussis by PCR NOT DETECTED NOT DETECTED    B Pertussis by PCR NOT DETECTED NOT DETECTED    Chlamydophila Pneumonia PCR NOT DETECTED NOT DETECTED    Mycoplasma pneumo by PCR NOT DETECTED NOT DETECTED   Culture, Blood 1    Specimen: Blood   Result Value Ref Range    Specimen BLOOD     Special Requests NONE     Culture NO GROWTH AT 4 DAYS    Culture, Blood 2    Specimen: Blood   Result Value Ref Range    Specimen BLOOD     Special Requests NONE     Culture NO GROWTH AT 4 DAYS    Culture, MRSA, Screening    Specimen: Nasal   Result Value Ref Range    Specimen NASAL     Special Requests NONE     Culture       Final Report No Staph aureus MRSA isolated by culture. No Staph aureus MSSA isolated by culture.    CBC auto diff   Result Value Ref Range    WBC 5.9 4.0 - 10.5 K/CU MM    RBC 6.24 (H) 4.2 - 5.4 M/CU MM    Hemoglobin 16.6 (H) 12.5 - 16.0 GM/DL    Hematocrit 55.9 (H) 37 - 47 %    MCV 89.6 78 - 100 FL    MCH 26.6 (L) 27 - 31 PG    MCHC 29.7 (L) 32.0 - 36.0 %    RDW 14.8 11.7 - 14.9 %    Platelets 705 764 - 084 K/CU MM    MPV 10.7 7.5 - 11.1 FL    Differential Type AUTOMATED DIFFERENTIAL     Segs Relative 66.3 (H) 36 - 66 %    Lymphocytes % 16.7 (L) 24 - 44 %    Monocytes % 15.8 (H) 0 - 4 %    Eosinophils % 0.5 0 - 3 %    Basophils % 0.5 0 - 1 %    Segs Absolute 3.9 K/CU MM    Lymphocytes Absolute 1.0 K/CU MM    Monocytes Absolute 0.9 K/CU MM    Eosinophils Absolute 0.0 K/CU MM    Basophils Absolute 0.0 K/CU MM    Nucleated RBC % 0.9 %    Total Nucleated RBC 0.1 K/CU MM    Total Immature Neutrophil 0.01 K/CU MM    Immature Neutrophil % 0.2 0 - 0.43 %   CMP   Result Value Ref Range    Sodium 135 135 - 145 MMOL/L    Potassium 4.5 3.5 - 5.1 MMOL/L    Chloride 99 99 - 110 mMol/L    CO2 31 21 - 32 MMOL/L    BUN 24 (H) 6 - 23 MG/DL    CREATININE 0.7 0.6 - 1.1 MG/DL    Glucose 112 (H) 70 - 99 MG/DL    Calcium 8.6 8.3 - 10.6 MG/DL    Albumin 3.3 (L) 3.4 - 5.0 GM/DL    Total Protein 6.0 (L) 6.4 - 8.2 GM/DL    Total Bilirubin 0.5 0.0 - 1.0 MG/DL ALT 11 10 - 40 U/L    AST 14 (L) 15 - 37 IU/L    Alkaline Phosphatase 91 40 - 129 IU/L    GFR Non-African American >60 >60 mL/min/1.73m2    GFR African American >60 >60 mL/min/1.73m2    Anion Gap 5 4 - 16   Troponin   Result Value Ref Range    Troponin T 0.011 (H) <0.01 NG/ML   Brain Natriuretic Peptide   Result Value Ref Range    Pro-BNP 3,098 (H) <300 PG/ML   Blood Gas, Venous   Result Value Ref Range    pH, Brayden 7.30 (L) 7.32 - 7.42    pCO2, Brayden 64 (H) 38 - 52 mmHG    pO2, Brayden 69 (H) 28 - 48 mmHG    Base Exc, Mixed 3.2 (H) 0 - 2.3    HCO3, Venous 31.5 (H) 19 - 25 MMOL/L    O2 Sat, Brayden 85.6 (H) 50 - 70 %    Comment VBG    Lactic Acid, Plasma   Result Value Ref Range    Lactate 1.0 0.4 - 2.0 mMOL/L   Procalcitonin   Result Value Ref Range    Procalcitonin 0.629    Basic Metabolic Panel w/ Reflex to MG   Result Value Ref Range    Sodium 139 135 - 145 MMOL/L    Potassium 4.5 3.5 - 5.1 MMOL/L    Chloride 97 (L) 99 - 110 mMol/L    CO2 37 (H) 21 - 32 MMOL/L    Anion Gap 5 4 - 16    BUN 18 6 - 23 MG/DL    CREATININE 0.8 0.6 - 1.1 MG/DL    Glucose 95 70 - 99 MG/DL    Calcium 8.3 8.3 - 10.6 MG/DL    GFR Non-African American >60 >60 mL/min/1.73m2    GFR African American >60 >60 mL/min/1.73m2   CBC auto differential   Result Value Ref Range    WBC 5.8 4.0 - 10.5 K/CU MM    RBC 5.89 (H) 4.2 - 5.4 M/CU MM    Hemoglobin 15.4 12.5 - 16.0 GM/DL    Hematocrit 53.0 (H) 37 - 47 %    MCV 90.0 78 - 100 FL    MCH 26.1 (L) 27 - 31 PG    MCHC 29.1 (L) 32.0 - 36.0 %    RDW 14.7 11.7 - 14.9 %    Platelets 421 610 - 576 K/CU MM    MPV 11.2 (H) 7.5 - 11.1 FL    Differential Type AUTOMATED DIFFERENTIAL     Segs Relative 56.4 36 - 66 %    Lymphocytes % 26.3 24 - 44 %    Monocytes % 15.5 (H) 0 - 4 %    Eosinophils % 0.7 0 - 3 %    Basophils % 0.9 0 - 1 %    Segs Absolute 3.3 K/CU MM    Lymphocytes Absolute 1.5 K/CU MM    Monocytes Absolute 0.9 K/CU MM    Eosinophils Absolute 0.0 K/CU MM    Basophils Absolute 0.1 K/CU MM    Nucleated RBC % 0.3 %    Total Nucleated RBC 0.0 K/CU MM    Total Immature Neutrophil 0.01 K/CU MM    Immature Neutrophil % 0.2 0 - 0.43 %   Basic Metabolic Panel w/ Reflex to MG   Result Value Ref Range    Sodium 139 135 - 145 MMOL/L    Potassium 3.8 3.5 - 5.1 MMOL/L    Chloride 96 (L) 99 - 110 mMol/L    CO2 41 (H) 21 - 32 MMOL/L    Anion Gap 2 (L) 4 - 16    BUN 9 6 - 23 MG/DL    CREATININE 0.5 (L) 0.6 - 1.1 MG/DL    Glucose 77 70 - 99 MG/DL    Calcium 7.9 (L) 8.3 - 10.6 MG/DL    GFR Non-African American >60 >60 mL/min/1.73m2    GFR African American >60 >60 mL/min/1.73m2   CBC   Result Value Ref Range    WBC 5.8 4.0 - 10.5 K/CU MM    RBC 5.99 (H) 4.2 - 5.4 M/CU MM    Hemoglobin 15.3 12.5 - 16.0 GM/DL    Hematocrit 53.3 (H) 37 - 47 %    MCV 89.0 78 - 100 FL    MCH 25.5 (L) 27 - 31 PG    MCHC 28.7 (L) 32.0 - 36.0 %    RDW 14.6 11.7 - 14.9 %    Platelets 725 501 - 970 K/CU MM    MPV 10.5 7.5 - 11.1 FL   Magnesium   Result Value Ref Range    Magnesium 1.8 1.8 - 2.4 mg/dl   Brain Natriuretic Peptide   Result Value Ref Range    Pro-BNP 1,001 (H) <300 PG/ML   Basic Metabolic Panel w/ Reflex to MG   Result Value Ref Range    Sodium 140 135 - 145 MMOL/L    Potassium 4.2 3.5 - 5.1 MMOL/L    Chloride 98 (L) 99 - 110 mMol/L    CO2 35 (H) 21 - 32 MMOL/L    Anion Gap 7 4 - 16    BUN 8 6 - 23 MG/DL    CREATININE 0.5 (L) 0.6 - 1.1 MG/DL    Glucose 124 (H) 70 - 99 MG/DL    Calcium 8.1 (L) 8.3 - 10.6 MG/DL    GFR Non-African American >60 >60 mL/min/1.73m2    GFR African American >60 >60 mL/min/1.73m2   CBC   Result Value Ref Range    WBC 5.5 4.0 - 10.5 K/CU MM    RBC 6.45 (H) 4.2 - 5.4 M/CU MM    Hemoglobin 16.1 (H) 12.5 - 16.0 GM/DL    Hematocrit 56.9 (H) 37 - 47 %    MCV 88.2 78 - 100 FL    MCH 25.0 (L) 27 - 31 PG    MCHC 28.3 (L) 32.0 - 36.0 %    RDW 14.7 11.7 - 14.9 %    Platelets 954 411 - 131 K/CU MM    MPV 10.5 7.5 - 11.1 FL   Vancomycin, random   Result Value Ref Range    Vancomycin Rm 5.8 UG/ML    DOSE AMOUNT DOSE AMT.  GIVEN - 750     DOSE TIME DOSE TIME GIVEN - every 18 hours    Basic Metabolic Panel w/ Reflex to MG   Result Value Ref Range    Sodium 141 135 - 145 MMOL/L    Potassium 3.8 3.5 - 5.1 MMOL/L    Chloride 102 99 - 110 mMol/L    CO2 35 (H) 21 - 32 MMOL/L    Anion Gap 4 4 - 16    BUN 10 6 - 23 MG/DL    CREATININE 0.5 (L) 0.6 - 1.1 MG/DL    Glucose 93 70 - 99 MG/DL    Calcium 8.1 (L) 8.3 - 10.6 MG/DL    GFR Non-African American >60 >60 mL/min/1.73m2    GFR African American >60 >60 mL/min/1.73m2   CBC   Result Value Ref Range    WBC 6.5 4.0 - 10.5 K/CU MM    RBC 6.06 (H) 4.2 - 5.4 M/CU MM    Hemoglobin 15.6 12.5 - 16.0 GM/DL    Hematocrit 53.0 (H) 37 - 47 %    MCV 87.5 78 - 100 FL    MCH 25.7 (L) 27 - 31 PG    MCHC 29.4 (L) 32.0 - 36.0 %    RDW 14.6 11.7 - 14.9 %    Platelets 872 652 - 815 K/CU MM    MPV 11.2 (H) 7.5 - 11.1 FL   Basic Metabolic Panel w/ Reflex to MG   Result Value Ref Range    Sodium 141 135 - 145 MMOL/L    Potassium 3.7 3.5 - 5.1 MMOL/L    Chloride 101 99 - 110 mMol/L    CO2 33 (H) 21 - 32 MMOL/L    Anion Gap 7 4 - 16    BUN 11 6 - 23 MG/DL    CREATININE 0.5 (L) 0.6 - 1.1 MG/DL    Glucose 85 70 - 99 MG/DL    Calcium 8.2 (L) 8.3 - 10.6 MG/DL    GFR Non-African American >60 >60 mL/min/1.73m2    GFR African American >60 >60 mL/min/1.73m2   CBC   Result Value Ref Range    WBC 6.1 4.0 - 10.5 K/CU MM    RBC 6.27 (H) 4.2 - 5.4 M/CU MM    Hemoglobin 16.2 (H) 12.5 - 16.0 GM/DL    Hematocrit 54.7 (H) 37 - 47 %    MCV 87.2 78 - 100 FL    MCH 25.8 (L) 27 - 31 PG    MCHC 29.6 (L) 32.0 - 36.0 %    RDW 14.8 11.7 - 14.9 %    Platelets 391 312 - 144 K/CU MM    MPV 11.2 (H) 7.5 - 11.1 FL   Vancomycin, random   Result Value Ref Range    Vancomycin Rm 10.0 UG/ML    DOSE AMOUNT DOSE AMT.  GIVEN - `     DOSE TIME DOSE TIME GIVEN - `    EKG 12 Lead   Result Value Ref Range    Ventricular Rate 114 BPM    Atrial Rate 114 BPM    P-R Interval 126 ms    QRS Duration 74 ms    Q-T Interval 346 ms    QTc Calculation (Bazett) 476 ms    P Axis 116 degrees R Axis 151 degrees    T Axis 58 degrees    Diagnosis       Poor data quality, interpretation may be adversely affected  Sinus tachycardia  Right axis deviation  Pulmonary disease pattern  Right ventricular hypertrophy  Abnormal ECG  When compared with ECG of 27-FEB-2017 17:19,  Poor data quality in current ECG precludes serial comparison  Confirmed by [facility] Guillermo PAREDES ([id number]) on 12/29/2021 7:23:52 PM           Chart review shows recent radiographs:  Echocardiogram complete 2D with doppler with color    Result Date: 12/29/2021  Transthoracic Echocardiography Report (TTE)  Demographics   Patient Name       Rigoberto CONDON    Date of Study       12/29/2021   Date of Birth      1965         Gender              Female   Age                60 year(s)         Race                   Patient Number     [id number]         Room Number         IX31   Visit Number       [id number]   Corporate ID       [id number]   Accession Number   [id number]         Libby Sanchez RVT   Ordering Physician Kassi Blanco MD        Physician           Mike PAREDES  Procedure Type of Study   TTE procedure:ECHOCARDIOGRAM COMPLETE 2D W DOPPLER W COLOR. Procedure Date Date: 12/29/2021 Start: 07:36 AM Study Location: Portable Technical Quality: Adequate visualization Indications:Congestive heart failure. Patient Status: Routine Height: 68 inches Weight: 110 pounds BSA: 1.59 m2 BMI: 16.73 kg/m2 HR: 82 bpm BP: 117/75 mmHg Allergies   - Other allergy:(Darvocet). Conclusions   Summary  Left ventricular systolic function is normal.  Ejection fraction is visually estimated at 55%. D-shaped septum indicative of right ventricular volume overload. Severely dilated and hypokinetic right ventricle. No evidence of any pericardial effusion.    Signature ------------------------------------------------------------------  Electronically signed by Cassandra Herrera MD  (Interpreting physician) on 12/29/2021 at 10:06 AM  ------------------------------------------------------------------   Findings   Left Ventricle  Left ventricular systolic function is normal.  Ejection fraction is visually estimated at 55%. D-shaped septum indicative of right ventricular volume overload. Left ventricle size is normal.  Indeterminate diastolic function. No regional wall motion abnormalities. Left Atrium  Mildly dilated left atrium. Right Atrium  Severely dilated right atrium. Right Ventricle  Severely dilated and hypokinetic right ventricle. Aortic Valve  Structurally normal aortic valve. Mitral Valve  Structurally normal mitral valve. Tricuspid Valve  Trace tricuspid regurgitation; RVSP: 27 mmHg. Pulmonic Valve  Pulmonic valve is structurally normal.   Pericardial Effusion  No evidence of any pericardial effusion. Pleural Effusion  No evidence of pleural effusion. Miscellaneous  Inferior vena cava is dilated, measuring at 3 cm, but does not collapse with  respiration. The abdominal aorta is within normal limits.   M-Mode/2D Measurements & Calculations   LV Diastolic Dimension:  LV Systolic Dimension:  LA Dimension: 2.9 cmAO Root  3.63 cm                  2.39 cm                 Dimension: 2.9 cmLA Area:  LV FS:34.2 %             LV Volume Diastolic: 61 11.6 cm2  LV PW Diastolic: 8.22 cm ml  LV PW Systolic: 6.62 cm  LV Volume Systolic: 16  Septum Diastolic: 9.31   ml  cm                       LV EDV/LV EDV Index: 61 RV Diastolic Dimension: 3.9  Septum Systolic: 3.19 cm [facility] ESV   cm  CO: 2.6 l/min            Index: 16 ml/10 m2  CI: 1.64 l/m*m2          EF Calculated (A4C):    LA/Aorta: 1                           73.8 %  LV Area Diastolic: 23    EF Calculated (2D): 64  LA volume/Index: 41 ml  cm2                      % COMPARISON: None. HISTORY: ORDERING SYSTEM PROVIDED HISTORY: DVT TECHNOLOGIST PROVIDED HISTORY: Reason for exam:->DVT Reason for Exam: DVT FINDINGS: The visualized veins of the bilateral lower extremities are patent and free of echogenic thrombus. The veins demonstrate good compressibility with normal color flow study and spectral analysis. No evidence of DVT in either lower extremity. RECOMMENDATIONS: Unavailable     CTA CHEST W CONTRAST    Result Date: 12/29/2021  EXAMINATION: CTA OF THE CHEST 12/29/2021 10:33 am TECHNIQUE: CTA of the chest was performed after the administration of intravenous contrast.  Multiplanar reformatted images are provided for review. MIP images are provided for review. Dose modulation, iterative reconstruction, and/or weight based adjustment of the mA/kV was utilized to reduce the radiation dose to as low as reasonably achievable. COMPARISON: Chest radiograph dated 12/28/2021. HISTORY: ORDERING SYSTEM PROVIDED HISTORY: PE TECHNOLOGIST PROVIDED HISTORY: Reason for exam:->PE Reason for Exam: shortness of breath, r/o PE Additional signs and symptoms: 80ml Isovue 370 Relevant Medical/Surgical History: hx Centrilobular emphysema FINDINGS: Pulmonary Arteries: Evaluation is partially limited due to patient motion artifact. Within the limitations of the study, no evidence of i discrete intraluminal filling defect is seen to suggest acute pulmonary embolism. Main pulmonary artery is normal in caliber. Mediastinum: No evidence of enlarged mediastinal lymph nodes is seen. The thoracic aorta appears normal in caliber. Vascular calcification changes of the thoracic aorta and coronary arteries seen. Lungs/pleura: The central airway appears patent. There is bilateral interlobular septal thickening and bronchovascular bundle thickening with subtle patchy areas of ground-glass opacities, suspicious for underlying congestive heart failure and pulmonary edema.   There is a moderate size right and trace left pleural effusion. There is atelectasis/opacity of the right lower lobe. Correlate clinically with signs of pneumonia. There is mild emphysematous changes. No evidence of pneumothorax is seen. There is 4 mm nodule in the right upper lobe (axial image 31). There are calcified granulomas of the right middle lobe. Upper Abdomen: Limited images of the upper abdomen are unremarkable. Soft Tissues/Bones: No evidence of acute osseous abnormality seen. There is mild-to-moderate soft tissue anasarca. No convincing evidence of discrete intraluminal filling defect is seen to suggest underlying acute pulmonary emboli. Imaging features suggestive of underlying congestive heart failure/pulmonary edema. Moderate size right and trace left pleural effusion. Atelectasis/opacity of the right lower lobe. Correlate clinically with signs of pneumonia. Follow-up to imaging resolution is recommended. 4 mm nodule in the right upper lobe. If the patient is low risk, this does not meet the criteria for imaging follow-up. If the patient is high risk, optional follow-up chest CT in 12 months is recommended. Mild emphysematous changes of the lungs.  RECOMMENDATIONS: Unavailable     NM MYOCARDIAL SPECT REST EXERCISE OR RX    Result Date: 12/29/2021  Cardiac Perfusion Imaging   Demographics   Patient Name      Jayson CONDON    Date of study        12/29/2021   Date of Birth     1965         Gender               Female   Age               60 year(s)         Race                    Patient Number    [id number]         Room Number          UV81   Visit Number      [id number]          Height               68 inches   Corporate ID      [id number]           Weight               110 pounds   Accession Number  [id number]                                        NM Technologist      Janeece Krabbe                                                            [facility]   Ordering          Ferol Angle  Interpreting Roselia Valles  Physician         Mike PAREDES        Cardiologist         Mike PAREDES   Conclusions   Summary  Normal tracer uptake in all segments of myocardium on stress ans rest  images. Normal Lexiscan nuclear scintigraphic study suggestive of normal  myocardial perfusion. Gated images demonstrate normal left ventricular  systolic function with EF of 55 %. Signatures   ------------------------------------------------------------------  Electronically signed by Kaden Winkler MD  (Interpreting cardiologist) on 12/29/2021 at 12:00  ------------------------------------------------------------------  Procedure Procedure Type:   Nuclear Stress Test:Pharmacological, Myocardial Perfusion Imaging with  Pharm, NM MYOCARDIAL SPECT REST EXERCISE OR RX  Indications: Chest pain. Risk Factors   The patient risk factors include:chronic lung disease. Stress Protocols   Resting ECG  Normal sinus rhythm. Resting HR:81 bpm  Resting BP:93/60 mmHg  Stress Protocol:Pharmacologic - Lexiscan  Peak HR:87 bpm                   HR/BP product:7656  Peak BP:88/54 mmHg  Predicted HR: 162 bpm  % of predicted HR: 54   Exercise duration: 01:04 min  Reason for termination:Completed   Symptoms  Feeling funny on belly side. The patient was given an intravenous injection of  Aminophylline to relieve symptoms from Arlice Host. Procedure Medications   - Lexiscan I.V. bolus (over 15sec.) 0.4 mg admininstered @ 12/29/2021 09:05.   - Aminophylline I.V. 75 mg admininstered @ 12/29/2021 09:08. Imaging Protocols   Rest                             Stress   Isotope:Sestamibi 99mTc          Isotope: Sestamibi 99mTc  Isotope dose:10.4 mCi            Isotope dose:33 mCi  Administration route: I.V. Administration route: I.V.   Injection Date:12/29/2021 07:25  Injection Date:12/29/2021 09:05  Scan Date:12/29/2021 00:00       Scan Date:12/29/2021 00:00   Technique:        SPECT          Technique:        Gated SPECT   Procedure Description   Upon patient arrival, the patient is identified using two identifiers and  the physician order is verified. An IV is established and 8-11mCi of 99mTc  Sestamibi is intravenously injected and followed with 10mL 0.9% Normal  Saline flush. A circulation period of 45 minutes occurs prior to resting  SPECT imaging. After imaging is complete the patient is escorted to the  stress lab. The patient is connected to the ECG and blood pressure is  measured. The RN starts the stress portion of the exam and rapidly  intravenously injects Lexiscan (regadenosine) 0.4mg over a period of 10 to15  seconds and follows with 5mL 0.9% Normal Saline flush. Immediately following  the Nuclear Technologist intravenously injects 22-33mCi of 99mTc Sestamibi  and 5mL 0.9% Normal Saline flush. After completion, recovery, and removal of  the IV, the patient rests during the second circulation period of 45  minutes. Final stress SPECT gated imaging is performed. The patient may  return home or to their room after stress imaging. The images are processed  and final charting is completed and sent to the appropriate cardiologist for  interpretation and reporting. Perfusion Interpretation   Normal tracer uptake in all segments of myocardium on stress ans rest  images. Imaging Results    Summed scores     - Summed stress score: 7     - Summed rest score: 4     - Summed difference score:    3   Rest ejection  Ejection fraction:70 %  EDV :53 ml  ESV :16 ml  Stroke volume :37 ml  Medical History   Accession#:  [id number]  Admission Data Admission date: 12/28/2021 Admission Time: 20:10 Hospital Status: Inpatient. EKG     Rhythm: normal sinus         There is no immunization history on file for this patient. The patient was seen and examined on day of discharge and this discharge summary is in conjunction with any daily progress note from day of discharge. Time Spent on discharge is   >35 min  in the examination, evaluation, counseling and review of medications and discharge plan.             SignedKhoi Hand MD   1/3/2022

## 2022-01-03 NOTE — PROGRESS NOTES
Pt home O2 paperwork faxed to [facility]. Please do not discharge pt without oxygen. This testing will be good for 48 hours and will have to be repeated if pt has not discharged prior to then. If IGO has not been delivered prior to pt being ready to leave, please call PFT @ [phone] or Respiratory Therapy @ [phone]. Thanks.

## 2022-01-03 NOTE — PROGRESS NOTES
Patient was seen in hospital for   Centrilobular Emphysema   . I am prescribing oxygen because the diagnosis and testing requires the patient to have oxygen in the home. Conditions will improve or be benefited by oxygen use. The patient is able to perform good mobility and therefore requires the use of a portable oxygen system for ambulation.

## 2022-01-04 LAB
CULTURE: NORMAL
CULTURE: NORMAL
Lab: NORMAL
Lab: NORMAL
SPECIMEN: NORMAL
SPECIMEN: NORMAL

## 2022-01-26 ENCOUNTER — OFFICE VISIT (OUTPATIENT)
Dept: CARDIOLOGY CLINIC | Age: 57
End: 2022-01-26
Payer: COMMERCIAL

## 2022-01-26 VITALS
HEART RATE: 75 BPM | DIASTOLIC BLOOD PRESSURE: 84 MMHG | HEIGHT: 67 IN | BODY MASS INDEX: 16.79 KG/M2 | SYSTOLIC BLOOD PRESSURE: 120 MMHG | WEIGHT: 107 LBS

## 2022-01-26 DIAGNOSIS — I65.23 BILATERAL CAROTID ARTERY STENOSIS: Primary | ICD-10-CM

## 2022-01-26 DIAGNOSIS — Z92.89 HISTORY OF RECENT HOSPITALIZATION: ICD-10-CM

## 2022-01-26 DIAGNOSIS — J43.2 CENTRILOBULAR EMPHYSEMA (HCC): ICD-10-CM

## 2022-01-26 DIAGNOSIS — Z86.79: ICD-10-CM

## 2022-01-26 DIAGNOSIS — R00.2 PALPITATIONS: ICD-10-CM

## 2022-01-26 PROCEDURE — G8484 FLU IMMUNIZE NO ADMIN: HCPCS | Performed by: NURSE PRACTITIONER

## 2022-01-26 PROCEDURE — 1111F DSCHRG MED/CURRENT MED MERGE: CPT | Performed by: NURSE PRACTITIONER

## 2022-01-26 PROCEDURE — G8427 DOCREV CUR MEDS BY ELIG CLIN: HCPCS | Performed by: NURSE PRACTITIONER

## 2022-01-26 PROCEDURE — G8419 CALC BMI OUT NRM PARAM NOF/U: HCPCS | Performed by: NURSE PRACTITIONER

## 2022-01-26 PROCEDURE — 99214 OFFICE O/P EST MOD 30 MIN: CPT | Performed by: NURSE PRACTITIONER

## 2022-01-26 PROCEDURE — 93000 ELECTROCARDIOGRAM COMPLETE: CPT | Performed by: NURSE PRACTITIONER

## 2022-01-26 PROCEDURE — 3017F COLORECTAL CA SCREEN DOC REV: CPT | Performed by: NURSE PRACTITIONER

## 2022-01-26 PROCEDURE — 4004F PT TOBACCO SCREEN RCVD TLK: CPT | Performed by: NURSE PRACTITIONER

## 2022-01-26 PROCEDURE — 3023F SPIROM DOC REV: CPT | Performed by: NURSE PRACTITIONER

## 2022-01-26 RX ORDER — ASPIRIN 81 MG/1
81 TABLET ORAL DAILY
Qty: 90 TABLET | Refills: 1 | Status: SHIPPED | OUTPATIENT
Start: 2022-01-26

## 2022-01-26 ASSESSMENT — ENCOUNTER SYMPTOMS
SLEEP DISTURBANCES DUE TO BREATHING: 0
ORTHOPNEA: 0
SHORTNESS OF BREATH: 1

## 2022-01-26 NOTE — PATIENT INSTRUCTIONS
**It is YOUR responsibilty to bring medication bottles and/or updated medication list to [address] [place] [address]. This will allow us to better serve you and all your healthcare needs**  Please be informed that if you contact our office outside of normal business hours the physician on call cannot help with any scheduling or rescheduling issues, procedure instruction questions or any type of medication issue. We advise you for any urgent/emergency that you go to the nearest emergency room! PLEASE CALL OUR OFFICE DURING NORMAL BUSINESS HOURS    Monday - Friday   8 am to 5 pm    [place]: [phone]    [place]:  [phone]    Please hold on to these instructions the  will call you within 1-9 business days when we receive authorization from your insurance. Echocardiogram    WHAT TO EXPECT:   ? This test will take approximately 45 minutes. ? It is an ultrasound of the heart. ? It can look at the valves and chambers inside the heart   ? There is no special instructions for this test.     If you are unable to keep this appointment, please notify us 24 hours prior to test at [phone]. Please hold on to these instructions the  will call you within 1-9 business days when we receive authorization from your insurance. Carotid Ultrasound    WHAT TO EXPECT:   ? This test will take approximately 45 minutes. ? It is an ultrasound of the carotid arteries in your neck. ? There is no special instructions for this test.     If you are unable to keep this appointment, please notify us 24 hours prior to test at [phone].

## 2022-01-26 NOTE — PROGRESS NOTES
1/26/2022  Primary cardiologist: Dr. Jefry Layton  is an established 60 y.o.  female here for Raeann luz follow-up on   1. Bilateral carotid artery stenosis    2. History of recent hospitalization    3. Centrilobular emphysema ([address]    4. History of acute cor pulmonale    5. Palpitations           SUBJECTIVE/OBJECTIVE:    HPI :   Frida Shoulders reports that she is feeling much better. She states that          Review of Systems   Constitutional: Negative for malaise/fatigue. Eyes: Negative for visual disturbance. Cardiovascular: Negative for chest pain, claudication, cyanosis, dyspnea on exertion, irregular heartbeat, leg swelling, near-syncope, orthopnea, palpitations, paroxysmal nocturnal dyspnea and syncope. Respiratory: Positive for shortness of breath. Negative for sleep disturbances due to breathing. Neurological: Negative for focal weakness, light-headedness and weakness. Psychiatric/Behavioral: Negative for depression. The patient is not nervous/anxious. Vitals:    01/26/22 0856   BP: 120/84   Site: Left Upper Arm   Position: Sitting   Cuff Size: Child   Pulse: 75   Weight: 107 lb (48.5 kg)   Height: 5' 7\" (1.702 m)     No flowsheet data found. Wt Readings from Last 3 Encounters:   01/26/22 107 lb (48.5 kg)   12/29/21 110 lb (49.9 kg)   11/12/19 107 lb (48.5 kg)     Body mass index is 16.76 kg/m². Physical Exam  Vitals reviewed. Constitutional:       General: She is not in acute distress. Appearance: Normal appearance. She is not ill-appearing. HENT:      Head: Atraumatic. Neck:      Vascular: No carotid bruit. Cardiovascular:      Rate and Rhythm: Normal rate and regular rhythm. Pulses: Normal pulses. Heart sounds: Normal heart sounds. No murmur heard. Pulmonary:      Effort: Pulmonary effort is normal. No respiratory distress. Breath sounds: Normal breath sounds. Musculoskeletal:         General: No swelling or deformity.       Cervical back: Neck supple. No muscular tenderness. Neurological:      Mental Status: She is alert. Current Outpatient Medications   Medication Sig Dispense Refill    albuterol sulfate  (90 Base) MCG/ACT inhaler Inhale 2 puffs into the lungs every 4 hours as needed for Wheezing or Shortness of Breath 18 g 5    furosemide (LASIX) 20 MG tablet Take 1 tablet by mouth daily 60 tablet 3    potassium chloride (KLOR-CON M) 20 MEQ extended release tablet Take 0.5 tablets by mouth daily 30 tablet 1    MUCINEX 600 MG extended release tablet take 1 tablet by mouth twice a day 60 tablet 5    VENTOLIN  (90 Base) MCG/ACT inhaler Inhale 2 puffs into the lungs 4 times daily (Patient not taking: Reported on 1/26/2022) 1 Inhaler 3    BREO ELLIPTA 100-25 MCG/INH AEPB inhaler  (Patient not taking: Reported on 1/26/2022)  0    omeprazole (PRILOSEC) 10 MG delayed release capsule Take 10 mg by mouth daily (Patient not taking: Reported on 1/26/2022)      SPIRIVA RESPIMAT 1.25 MCG/ACT AERS inhaler inhale 2 puffs INTO THE LUNGS once daily (Patient not taking: Reported on 1/26/2022) 4 g 5    aspirin 81 MG tablet Take 81 mg by mouth daily (Patient not taking: Reported on 1/26/2022)      lidocaine (LIDODERM) 5 % apply 1 patch to the affected area daily. Leave on for 12 hours and then off for 12 hours. (Patient not taking: Reported on 1/26/2022)  0    atorvastatin (LIPITOR) 40 MG tablet take 1 tablet by mouth once daily (Patient not taking: Reported on 1/26/2022)  0    cetirizine (ZYRTEC ALLERGY) 10 MG tablet Take 1 tablet by mouth daily (Patient not taking: Reported on 1/26/2022) 30 tablet 5     No current facility-administered medications for this visit. Notes reviewed: Robel Edmonds MD 9/12/2019  2:08 PM        All pertinent data reviewed and discussed with patient    Echo 12/29/2021   Summary   Left ventricular systolic function is normal.   Ejection fraction is visually estimated at 55%.    D-shaped septum indicative of right ventricular volume overload. Severely dilated and hypokinetic right ventricle. No evidence of any pericardial effusion. ASSESSMENT/PLAN:    Bilateral carotid artery stenosis  Known right internal carotid arteries 100% occluded. Cardiothoracic surgery evaluated patient 9/2019  Left internal carotid artery has mild disease  Plan to recheck carotid arteries every 2 years  will recheck per patient request. Restart ASA    History of recent hospitalization for acute cor pulmonale  Patient admitted December 28, 2021 for fluid volume overload. Echocardiogram shows ejection fraction of 60%. Patient did have clear signs of fluid overload thought to be secondary to pneumonia and acute cor pulmonale and chronic COPD/emphysema. BNP elevation likely due to fluid overload and strain placed from pneumonia. Diastolic dysfunction was not noted on most recent echocardiogram, but echocardiogram 4/24/2018 showed grade 1 diastolic dysfunction. Therefore, HFpEF cannot be ruled out. Encourage patient to have low-sodium diet. Continue Lasix 20 mg daily. Recheck echocardiogram for evaluation of diastolic dysfunction/right-sided failure. Centrilobular emphysema ([address]  Patient reports pulmonary function is stable  Patient sees Dr. Faby Yan as an outpatient. Reviewed strain COPD places on right side of heart encourage patient to continue to follow-up with pulmonology for management. Tobacco abuse   She stopped smoking the day after she left the hospital.    congratulated on stopping. Medications reviewed and confirmed with patient     Tests ordered:  Echo, carotid US,      Follow-up  1 months     Signed:  ENZO Michel CNP, 1/26/2022, 9:16 AM    An electronic signature was used to authenticate this note.     Please note this report has been partially produced using speech recognition software and may contain errors related to that system including errors in grammar, punctuation, and spelling, as well as words and phrases that may be inappropriate. If there are any questions or concerns please feel free to contact the dictating provider for clarification.

## 2022-02-09 ENCOUNTER — PROCEDURE VISIT (OUTPATIENT)
Dept: CARDIOLOGY CLINIC | Age: 57
End: 2022-02-09
Payer: COMMERCIAL

## 2022-02-09 DIAGNOSIS — J43.2 CENTRILOBULAR EMPHYSEMA (HCC): ICD-10-CM

## 2022-02-09 DIAGNOSIS — J18.9 PNEUMONIA DUE TO INFECTIOUS ORGANISM, UNSPECIFIED LATERALITY, UNSPECIFIED PART OF LUNG: ICD-10-CM

## 2022-02-09 DIAGNOSIS — I65.23 BILATERAL CAROTID ARTERY STENOSIS: ICD-10-CM

## 2022-02-09 DIAGNOSIS — Z86.79: ICD-10-CM

## 2022-02-09 PROCEDURE — 93308 TTE F-UP OR LMTD: CPT | Performed by: INTERNAL MEDICINE

## 2022-02-09 PROCEDURE — 93880 EXTRACRANIAL BILAT STUDY: CPT | Performed by: INTERNAL MEDICINE

## 2022-02-11 ENCOUNTER — TELEPHONE (OUTPATIENT)
Dept: CARDIOLOGY CLINIC | Age: 57
End: 2022-02-11

## 2022-02-11 NOTE — TELEPHONE ENCOUNTER
Echo 2/9/22   This is a limited echocardiogram to evaluate LV function. Left ventricular function and size is normal, EF is estimated at 55-60%. Grade I diastolic dysfunction noted. No wall motion abnormalities. Right side of the heart is enlarged. No evidence of pericardial effusion. Pt verbally understood results.

## 2022-02-11 NOTE — TELEPHONE ENCOUNTER
----- Message from ENZO Monterroso CNP sent at 2/10/2022  9:26 PM EST -----  L carotid artery remains good  ----- Message -----  From: Carola Hess Incoming Cardiovascular Results From [facility]  Sent: 2/10/2022   7:15 PM EST  To: ENZO Monterroso CNP      Carotid Duplex Study  The Left Proximal internal carotid artery exhibits 0-49% stenosis.    Normal vertebral flow.         Pt verbally understood results.

## 2022-02-23 ENCOUNTER — OFFICE VISIT (OUTPATIENT)
Dept: CARDIOLOGY CLINIC | Age: 57
End: 2022-02-23
Payer: COMMERCIAL

## 2022-02-23 VITALS
SYSTOLIC BLOOD PRESSURE: 112 MMHG | HEIGHT: 67 IN | DIASTOLIC BLOOD PRESSURE: 70 MMHG | OXYGEN SATURATION: 88 % | HEART RATE: 88 BPM | WEIGHT: 113.6 LBS | BODY MASS INDEX: 17.83 KG/M2

## 2022-02-23 DIAGNOSIS — I65.23 BILATERAL CAROTID ARTERY STENOSIS: Primary | ICD-10-CM

## 2022-02-23 DIAGNOSIS — J43.2 CENTRILOBULAR EMPHYSEMA (HCC): ICD-10-CM

## 2022-02-23 PROCEDURE — G8484 FLU IMMUNIZE NO ADMIN: HCPCS | Performed by: INTERNAL MEDICINE

## 2022-02-23 PROCEDURE — 3023F SPIROM DOC REV: CPT | Performed by: INTERNAL MEDICINE

## 2022-02-23 PROCEDURE — G8427 DOCREV CUR MEDS BY ELIG CLIN: HCPCS | Performed by: INTERNAL MEDICINE

## 2022-02-23 PROCEDURE — 99214 OFFICE O/P EST MOD 30 MIN: CPT | Performed by: INTERNAL MEDICINE

## 2022-02-23 PROCEDURE — 4004F PT TOBACCO SCREEN RCVD TLK: CPT | Performed by: INTERNAL MEDICINE

## 2022-02-23 PROCEDURE — G8419 CALC BMI OUT NRM PARAM NOF/U: HCPCS | Performed by: INTERNAL MEDICINE

## 2022-02-23 PROCEDURE — 3017F COLORECTAL CA SCREEN DOC REV: CPT | Performed by: INTERNAL MEDICINE

## 2022-02-23 NOTE — PROGRESS NOTES
CARDIOLOGY NOTE      2/23/2022    RE: Andres Junior  (1965)                               TO:  Dr. Carlos Eduardo Hannah MD            Dejuan Morales is a 60 y.o. female who was seen today for management of HFpEF         Follow-up: Carotid ultrasound                           HPI:                   Pt has h/o shortness of breath, dilated right-sided chambers, COPD, HFpEF seen today for follow-up.  Pt has anxiety attacks , patients get short of breath and then gets anxiety and get tachycardic has no chest pain however, for her pulmonary issues which is centrilobular emphysema patient sees the pulmonary specialist in town    Andres Junior has the following history recorded in care path:  Patient Active Problem List    Diagnosis Date Noted    Severe [facility] ([facility] 12/30/2021    Pneumonia 12/29/2021    Bilateral carotid artery stenosis 05/15/2018    [facility] 10/05/2017     Current Outpatient Medications   Medication Sig Dispense Refill    aspirin EC 81 MG EC tablet Take 1 tablet by mouth daily 90 tablet 1    albuterol sulfate  (90 Base) MCG/ACT inhaler Inhale 2 puffs into the lungs every 4 hours as needed for Wheezing or Shortness of Breath 18 g 5    furosemide (LASIX) 20 MG tablet Take 1 tablet by mouth daily 60 tablet 3    potassium chloride (KLOR-CON M) 20 MEQ extended release tablet Take 0.5 tablets by mouth daily 30 tablet 1    MUCINEX 600 MG extended release tablet take 1 tablet by mouth twice a day 60 tablet 5    VENTOLIN  (90 Base) MCG/ACT inhaler Inhale 2 puffs into the lungs 4 times daily (Patient not taking: Reported on 1/26/2022) 1 Inhaler 3    BREO ELLIPTA 100-25 MCG/INH AEPB inhaler  (Patient not taking: Reported on 1/26/2022)  0    omeprazole (PRILOSEC) 10 MG delayed release capsule Take 10 mg by mouth daily (Patient not taking: Reported on 1/26/2022)      SPIRIVA RESPIMAT 1.25 MCG/ACT AERS inhaler inhale 2 puffs INTO THE LUNGS once daily (Patient not taking: Reported on 1/26/2022) 4 g 5    lidocaine (LIDODERM) 5 % apply 1 patch to the affected area daily. Leave on for 12 hours and then off for 12 hours. (Patient not taking: Reported on 1/26/2022)  0    atorvastatin (LIPITOR) 40 MG tablet take 1 tablet by mouth once daily (Patient not taking: Reported on 1/26/2022)  0    cetirizine (ZYRTEC ALLERGY) 10 MG tablet Take 1 tablet by mouth daily (Patient not taking: Reported on 1/26/2022) 30 tablet 5     No current facility-administered medications for this visit. Allergies: Darvocet [propoxyphene n-acetaminophen] and Propoxyphene  Past Medical History:   Diagnosis Date    COPD (chronic obstructive pulmonary disease) [facility]     Hx of Doppler ultrasound Carotid Duplex Study 02/09/2022    The Left Proximal internal carotid artery exhibits 0-49% stenosis. Normal vertebral flow.  Hx of echocardiogram 02/09/2022    EF is 55-60% no wall motion abnormalities. Right side of heart is enlarged. No evidence of pericardial effusion     No past surgical history on file. As reviewed No family history on file. Social History     Tobacco Use    Smoking status: Former Smoker     Packs/day: 1.00     Years: 45.00     Pack years: 45.00     Types: Cigarettes    Smokeless tobacco: Current User   Substance Use Topics    Alcohol use: No        Objective:    Vitals:    02/23/22 0831   BP: 112/70   Pulse: 88   SpO2: (!) 88%   Weight: 113 lb 9.6 oz (51.5 kg)   Height: 5' 7\" (1.702 m)     /70   Pulse 88   Ht 5' 7\" (1.702 m)   Wt 113 lb 9.6 oz (51.5 kg)   SpO2 (!) 88%   BMI 17.79 kg/m²     No flowsheet data found. Wt Readings from Last 3 Encounters:   02/23/22 113 lb 9.6 oz (51.5 kg)   01/26/22 107 lb (48.5 kg)   12/29/21 110 lb (49.9 kg)     Body mass index is 17.79 kg/m². GENERAL - Alert, oriented, pleasant, in no apparent distress. EYES: No jaundice, no conjunctival pallor. SKIN: It is warm & dry. No rashes.  No Echhymosis    HEENT  No massively dilated unclear etiology  Patient has cardiac cachexia as well from these issues  We will evaluate any cardiac MRI for further evaluation if needed  Conclusions    Conclusions        Summary    Normal tracer uptake in all segments of myocardium on stress ans rest    images. Normal Lexiscan nuclear scintigraphic study suggestive of normal    myocardial perfusion. Gated images demonstrate normal left ventricular    systolic function with EF of 55 %.      Signatures        ------------------------------------------------------------------    Electronically signed by Mary Jane MD   [id number] [facility] cardiologist) on 12/29/2021 at 12:00    ------------------------------------------------------------------     Patient's CTA chest was negative without any evidence of PE      Summary   This is a limited echocardiogram to evaluate LV function. Left ventricular function and size is normal, EF is estimated at 55-60%. Grade I diastolic dysfunction noted. No wall motion abnormalities. Right side of the heart is enlarged. No evidence of pericardial effusion. Signature      ------------------------------------------------------------------   Electronically signed by Mary Jane MD   (Interpreting physician) on 02/10/2022 at 07:14 PM      -HFpEF patient's BNP was elevated on the last echo  Patient is on Lasix 20 mg p.o. daily and replacement potassium has remained stable    -Carotid artery stenosis  Following findings are chronic we will continue with antiplatelet therapy  Conclusions        Summary        Occlusion of the right ICA.  Previous exam 09/05/2019.    The Left Proximal internal carotid artery exhibits 0-49% stenosis.    Normal vertebral flow.        Signature        ------------------------------------------------------------------    Electronically signed by Mary Jane MD    (Interpreting physician) on 02/10/2022 at 07:14 PM       -  LIPID MANAGEMENT:  Importance of lipid levels discussed with patient   and patient was given dietary advice. NCEP- ATP III guidelines reviewed with patient. -   Changes  in medicines made: No     Patient is on Lipitor 40 mg p.o. daily                  Patient's LDL last one is 147 on file           Justino Jeans MD    [address]    Please note this report has been partially produced using speech recognition software and may contain errors related to that system including errors in grammar, punctuation, and spelling, as well as words and phrases that may be inappropriate. If there are any questions or concerns please feel free to contact the dictating provider for clarification.

## 2022-02-23 NOTE — LETTER
Sathish Galvin     Karla Mealing  1965  [id number]    Have you had any Chest Pain that is not new? - No      Have you had any Shortness of Breath - Yes  If Yes - When on exertion    Have you had any dizziness - No    Have you had any palpitations that are not new?  - No    Do you have any edema - swelling in No      Do you have a surgery or procedure scheduled in the near future - No     [id number] Score for Atrial Fibrillation Stroke Risk   Risk   Factors  Component Value   C CHF No 0   H HTN No 0   A2 Age >= 75 No,  (57 y.o.) 0   D DM No 0   S2 Prior Stroke/TIA No 0   V Vascular Disease No 0   A Age 74-69 No,  (57 y.o.) 0   Sc Sex female 1    [id number]  Score  1   Score last updated 2/23/22 8:35 AM EST

## 2022-04-11 ENCOUNTER — HOSPITAL ENCOUNTER (OUTPATIENT)
Dept: WOMENS IMAGING | Age: 57
Discharge: HOME | End: 2022-04-11
Payer: COMMERCIAL

## 2022-04-11 ENCOUNTER — HOSPITAL ENCOUNTER (OUTPATIENT)
Age: 57
Discharge: HOME | End: 2022-04-11
Payer: COMMERCIAL

## 2022-04-11 DIAGNOSIS — M81.0 AGE-RELATED OSTEOPOROSIS WITHOUT CURRENT PATHOLOGICAL FRACTURE: ICD-10-CM

## 2022-04-11 DIAGNOSIS — Z12.31 SCREENING MAMMOGRAM FOR BREAST CANCER: ICD-10-CM

## 2022-04-11 LAB
ALBUMIN SERPL-MCNC: 4.6 GM/DL (ref 3.4–5)
ALP BLD-CCNC: 104 IU/L (ref 40–129)
ALT SERPL-CCNC: 13 U/L (ref 10–40)
ANION GAP SERPL CALCULATED.3IONS-SCNC: 8 MMOL/L (ref 4–16)
AST SERPL-CCNC: 18 IU/L (ref 15–37)
BACTERIA: NEGATIVE /HPF
BASOPHILS ABSOLUTE: 0.1 K/CU MM
BASOPHILS RELATIVE PERCENT: 1 % (ref 0–1)
BILIRUB SERPL-MCNC: 0.6 MG/DL (ref 0–1)
BILIRUBIN DIRECT: 0.2 MG/DL (ref 0–0.3)
BILIRUBIN URINE: NEGATIVE MG/DL
BILIRUBIN, INDIRECT: 0.4 MG/DL (ref 0–0.7)
BLOOD, URINE: NEGATIVE
BUN BLDV-MCNC: 11 MG/DL (ref 6–23)
CALCIUM SERPL-MCNC: 9.6 MG/DL (ref 8.3–10.6)
CHLORIDE BLD-SCNC: 97 MMOL/L (ref 99–110)
CHOLESTEROL: 244 MG/DL
CLARITY: CLEAR
CO2: 36 MMOL/L (ref 21–32)
COLOR: YELLOW
CREAT SERPL-MCNC: 0.7 MG/DL (ref 0.6–1.1)
DIFFERENTIAL TYPE: ABNORMAL
EOSINOPHILS ABSOLUTE: 0.1 K/CU MM
EOSINOPHILS RELATIVE PERCENT: 1.6 % (ref 0–3)
ERYTHROCYTE SEDIMENTATION RATE: 2 MM/HR (ref 0–30)
ESTIMATED AVERAGE GLUCOSE: 114 MG/DL
GFR AFRICAN AMERICAN: >60 ML/MIN/1.73M2
GFR NON-AFRICAN AMERICAN: >60 ML/MIN/1.73M2
GLUCOSE BLD-MCNC: 94 MG/DL (ref 70–99)
GLUCOSE, URINE: NEGATIVE MG/DL
HBA1C MFR BLD: 5.6 % (ref 4.2–6.3)
HCT VFR BLD CALC: 54.4 % (ref 37–47)
HDLC SERPL-MCNC: 74 MG/DL
HEMOGLOBIN: 16.3 GM/DL (ref 12.5–16)
IMMATURE NEUTROPHIL %: 0.2 % (ref 0–0.43)
KETONES, URINE: NEGATIVE MG/DL
LDL CHOLESTEROL CALCULATED: 156 MG/DL
LEUKOCYTE ESTERASE, URINE: ABNORMAL
LYMPHOCYTES ABSOLUTE: 1.5 K/CU MM
LYMPHOCYTES RELATIVE PERCENT: 31.4 % (ref 24–44)
MAGNESIUM: 2.1 MG/DL (ref 1.8–2.4)
MCH RBC QN AUTO: 28.4 PG (ref 27–31)
MCHC RBC AUTO-ENTMCNC: 30 % (ref 32–36)
MCV RBC AUTO: 94.9 FL (ref 78–100)
MONOCYTES ABSOLUTE: 0.5 K/CU MM
MONOCYTES RELATIVE PERCENT: 9.6 % (ref 0–4)
MUCUS: ABNORMAL HPF
NITRITE URINE, QUANTITATIVE: NEGATIVE
NUCLEATED RBC %: 0 %
PDW BLD-RTO: 19.3 % (ref 11.7–14.9)
PH, URINE: 6.5 (ref 5–8)
PLATELET # BLD: 205 K/CU MM (ref 140–440)
PMV BLD AUTO: 11.4 FL (ref 7.5–11.1)
POTASSIUM SERPL-SCNC: 4.3 MMOL/L (ref 3.5–5.1)
PROTEIN UA: NEGATIVE MG/DL
RBC # BLD: 5.73 M/CU MM (ref 4.2–5.4)
RBC URINE: <1 /HPF (ref 0–6)
SEGMENTED NEUTROPHILS ABSOLUTE COUNT: 2.8 K/CU MM
SEGMENTED NEUTROPHILS RELATIVE PERCENT: 56.2 % (ref 36–66)
SODIUM BLD-SCNC: 141 MMOL/L (ref 135–145)
SPECIFIC GRAVITY UA: 1.01 (ref 1–1.03)
SQUAMOUS EPITHELIAL: <1 /HPF
T4 FREE: 1.31 NG/DL (ref 0.9–1.8)
TOTAL IMMATURE NEUTOROPHIL: 0.01 K/CU MM
TOTAL NUCLEATED RBC: 0 K/CU MM
TOTAL PROTEIN: 7 GM/DL (ref 6.4–8.2)
TRICHOMONAS: ABNORMAL /HPF
TRIGL SERPL-MCNC: 69 MG/DL
TSH HIGH SENSITIVITY: 2.9 UIU/ML (ref 0.27–4.2)
URIC ACID: 4.8 MG/DL (ref 2.6–6)
UROBILINOGEN, URINE: 0.2 MG/DL (ref 0.2–1)
VITAMIN D 25-HYDROXY: 16.78 NG/ML
WBC # BLD: 4.9 K/CU MM (ref 4–10.5)
WBC UA: 1 /HPF (ref 0–5)

## 2022-04-11 PROCEDURE — 81001 URINALYSIS AUTO W/SCOPE: CPT

## 2022-04-11 PROCEDURE — 77080 DXA BONE DENSITY AXIAL: CPT

## 2022-04-11 PROCEDURE — 36415 COLL VENOUS BLD VENIPUNCTURE: CPT

## 2022-04-11 PROCEDURE — 84443 ASSAY THYROID STIM HORMONE: CPT

## 2022-04-11 PROCEDURE — 80061 LIPID PANEL: CPT

## 2022-04-11 PROCEDURE — 77067 SCR MAMMO BI INCL CAD: CPT

## 2022-04-11 PROCEDURE — 80053 COMPREHEN METABOLIC PANEL: CPT

## 2022-04-11 PROCEDURE — 85025 COMPLETE CBC W/AUTO DIFF WBC: CPT

## 2022-04-11 PROCEDURE — 83036 HEMOGLOBIN GLYCOSYLATED A1C: CPT

## 2022-04-11 PROCEDURE — 84439 ASSAY OF FREE THYROXINE: CPT

## 2022-04-11 PROCEDURE — 82248 BILIRUBIN DIRECT: CPT

## 2022-04-11 PROCEDURE — 85652 RBC SED RATE AUTOMATED: CPT

## 2022-04-11 PROCEDURE — 83735 ASSAY OF MAGNESIUM: CPT

## 2022-04-11 PROCEDURE — 82306 VITAMIN D 25 HYDROXY: CPT

## 2022-04-11 PROCEDURE — 84550 ASSAY OF BLOOD/URIC ACID: CPT

## 2022-07-29 ENCOUNTER — OFFICE VISIT (OUTPATIENT)
Dept: CARDIOLOGY CLINIC | Age: 57
End: 2022-07-29
Payer: COMMERCIAL

## 2022-07-29 VITALS
DIASTOLIC BLOOD PRESSURE: 60 MMHG | HEART RATE: 78 BPM | SYSTOLIC BLOOD PRESSURE: 110 MMHG | WEIGHT: 105.2 LBS | HEIGHT: 67 IN | BODY MASS INDEX: 16.51 KG/M2

## 2022-07-29 DIAGNOSIS — I50.812 CHRONIC RIGHT-SIDED HEART FAILURE (HCC): ICD-10-CM

## 2022-07-29 DIAGNOSIS — I65.21 OCCLUSION OF RIGHT CAROTID ARTERY: Primary | ICD-10-CM

## 2022-07-29 PROCEDURE — 99214 OFFICE O/P EST MOD 30 MIN: CPT | Performed by: NURSE PRACTITIONER

## 2022-07-29 PROCEDURE — 3017F COLORECTAL CA SCREEN DOC REV: CPT | Performed by: NURSE PRACTITIONER

## 2022-07-29 PROCEDURE — G8419 CALC BMI OUT NRM PARAM NOF/U: HCPCS | Performed by: NURSE PRACTITIONER

## 2022-07-29 PROCEDURE — 4004F PT TOBACCO SCREEN RCVD TLK: CPT | Performed by: NURSE PRACTITIONER

## 2022-07-29 PROCEDURE — G8427 DOCREV CUR MEDS BY ELIG CLIN: HCPCS | Performed by: NURSE PRACTITIONER

## 2022-07-29 RX ORDER — ATORVASTATIN CALCIUM 40 MG/1
40 TABLET, FILM COATED ORAL DAILY
Qty: 30 TABLET | Refills: 5 | Status: SHIPPED | OUTPATIENT
Start: 2022-07-29

## 2022-07-29 ASSESSMENT — ENCOUNTER SYMPTOMS
SHORTNESS OF BREATH: 1
ORTHOPNEA: 0

## 2022-07-29 NOTE — PROGRESS NOTES
7/29/2022  Primary cardiologist: Dr. Ricky An:   Eliud Fair  is an established 60 y.o.  female here for a 6 month follow up on HFrEF, carotid artery disease and dyslipidemia       SUBJECTIVE/OBJECTIVE:  Eliud Fair is a 60 y.o. female with a history of shortness of breath, dilated right-sided chambers, COPD, centrilobular emphysema, supplemental oxygen,HFpEF , carotid artery stenosis,and anxiety attacks    =  HPI :   Eliud Fair reports she is feeling anxious- she has difficult neighbor. She denies orthopnea or PND-she is using supplemental oxygen however does not have it on today as she tells me she was too heavy to carry. She has lost weight since her last office visit states that she becomes too short of breath with eating. Review of Systems   Constitutional: Positive for weight loss. Negative for diaphoresis and malaise/fatigue. Cardiovascular:  Negative for chest pain, claudication, dyspnea on exertion, irregular heartbeat, leg swelling, near-syncope, orthopnea, palpitations and paroxysmal nocturnal dyspnea. Respiratory:  Positive for shortness of breath. Neurological:  Negative for dizziness and light-headedness. Psychiatric/Behavioral:  The patient is nervous/anxious. Vitals:    07/29/22 1047   BP: 110/60   Pulse: 78   Weight: 105 lb 3.2 oz (47.7 kg)   Height: 5' 7\" (1.702 m)     No flowsheet data found. Wt Readings from Last 3 Encounters:   07/29/22 105 lb 3.2 oz (47.7 kg)   02/23/22 113 lb 9.6 oz (51.5 kg)   01/26/22 107 lb (48.5 kg)     Body mass index is 16.48 kg/m². Physical Exam  Constitutional:       Appearance: She is cachectic. She is not ill-appearing. HENT:      Mouth/Throat:      Mouth: Mucous membranes are moist.   Eyes:      Extraocular Movements: Extraocular movements intact. Pupils: Pupils are equal, round, and reactive to light. Neck:      Vascular: No carotid bruit. Cardiovascular:      Rate and Rhythm: Normal rate and regular rhythm. Pulses: Normal pulses. Heart sounds: Normal heart sounds. Pulmonary:      Effort: Tachypnea present. No respiratory distress. Breath sounds: Decreased breath sounds present. Abdominal:      General: There is no distension. Tenderness: There is no abdominal tenderness. Musculoskeletal:      Right lower leg: No edema. Left lower leg: No edema. Skin:     General: Skin is warm and dry. Capillary Refill: Capillary refill takes less than 2 seconds. Neurological:      General: No focal deficit present. Mental Status: She is alert and oriented to person, place, and time. Psychiatric:         Mood and Affect: Mood normal.              Current Outpatient Medications   Medication Sig Dispense Refill    aspirin EC 81 MG EC tablet Take 1 tablet by mouth daily 90 tablet 1    albuterol sulfate  (90 Base) MCG/ACT inhaler Inhale 2 puffs into the lungs every 4 hours as needed for Wheezing or Shortness of Breath 18 g 5    furosemide (LASIX) 20 MG tablet Take 1 tablet by mouth daily 60 tablet 3    potassium chloride (KLOR-CON M) 20 MEQ extended release tablet Take 0.5 tablets by mouth daily 30 tablet 1    MUCINEX 600 MG extended release tablet take 1 tablet by mouth twice a day 60 tablet 5    VENTOLIN  (90 Base) MCG/ACT inhaler Inhale 2 puffs into the lungs 4 times daily (Patient not taking: No sig reported) 1 Inhaler 3    BREO ELLIPTA 100-25 MCG/INH AEPB inhaler  (Patient not taking: No sig reported)  0    omeprazole (PRILOSEC) 10 MG delayed release capsule Take 10 mg by mouth daily (Patient not taking: No sig reported)      SPIRIVA RESPIMAT 1.25 MCG/ACT AERS inhaler inhale 2 puffs INTO THE LUNGS once daily (Patient not taking: No sig reported) 4 g 5    lidocaine (LIDODERM) 5 % apply 1 patch to the affected area daily. Leave on for 12 hours and then off for 12 hours.  (Patient not taking: No sig reported)  0    atorvastatin (LIPITOR) 40 MG tablet take 1 tablet by mouth once daily (Patient not taking: No sig reported)  0    cetirizine (ZYRTEC ALLERGY) 10 MG tablet Take 1 tablet by mouth daily (Patient not taking: No sig reported) 30 tablet 5     No current facility-administered medications for this visit. All pertinent data reviewed and discussed with patient       ASSESSMENT/PLAN:    Chronic right sided heart failure with cor pulmonale   Continues ongoing shortness of breath related to her advanced COPD/cor pulmonale  Not in fluid overload: Continue with Lasix 20 mg daily  Unfortunately she has not been using inhalers as they live about history amount. I have asked her to please follow-up with pulmonology so that medications can be adjusted -breath sounds are diminished throughout she appears mildly tachypneic however not in respiratory distress. Carotid artery disease  Known occlusion of the right ICA  She stopped her antilipemic and unsure why. Recommend resume atorvastatin 40 mg daily. Orders placed     Dyslipidemia   Lipids noted from April 2022  Total cholesterol 244, -she has been off her antilipemic: We will resume atorvastatin  We discussed goals of LDL of 100 or <     She appears cachectic-has ongoing weight loss and inability to eat due to increased shortness of breath. Discussed use of increasing protein and small amount such as hard-boiled egg, tuna packet or a protein drink as she is not able to eat a full meals      Tests ordered: None  Follow-up 6 months    Signed:  ENZO Louise CNP, 7/29/2022, 10:52 AM    An electronic signature was used to authenticate this note. Please note this report has been partially produced using speech recognition software and may contain errors related to that system including errors in grammar, punctuation, and spelling, as well as words and phrases that may be inappropriate. If there are any questions or concerns please feel free to contact the dictating provider for clarification.

## 2022-08-04 ENCOUNTER — INITIAL CONSULT (OUTPATIENT)
Dept: OBGYN | Age: 57
End: 2022-08-04
Payer: COMMERCIAL

## 2022-08-04 ENCOUNTER — HOSPITAL ENCOUNTER (OUTPATIENT)
Age: 57
Setting detail: SPECIMEN
Discharge: HOME | End: 2022-08-04
Payer: COMMERCIAL

## 2022-08-04 VITALS
SYSTOLIC BLOOD PRESSURE: 117 MMHG | HEIGHT: 67 IN | DIASTOLIC BLOOD PRESSURE: 72 MMHG | WEIGHT: 105 LBS | BODY MASS INDEX: 16.48 KG/M2

## 2022-08-04 DIAGNOSIS — N95.2 POSTMENOPAUSAL ATROPHIC VAGINITIS: ICD-10-CM

## 2022-08-04 DIAGNOSIS — Z12.4 ENCOUNTER FOR PAPANICOLAOU SMEAR OF CERVIX: Primary | ICD-10-CM

## 2022-08-04 PROCEDURE — 4004F PT TOBACCO SCREEN RCVD TLK: CPT

## 2022-08-04 PROCEDURE — G8419 CALC BMI OUT NRM PARAM NOF/U: HCPCS

## 2022-08-04 PROCEDURE — G8427 DOCREV CUR MEDS BY ELIG CLIN: HCPCS

## 2022-08-04 PROCEDURE — 99203 OFFICE O/P NEW LOW 30 MIN: CPT

## 2022-08-04 PROCEDURE — 88142 CYTOPATH C/V THIN LAYER: CPT

## 2022-08-04 PROCEDURE — 3017F COLORECTAL CA SCREEN DOC REV: CPT

## 2022-08-04 SDOH — ECONOMIC STABILITY: TRANSPORTATION INSECURITY
IN THE PAST 12 MONTHS, HAS LACK OF TRANSPORTATION KEPT YOU FROM MEETINGS, WORK, OR FROM GETTING THINGS NEEDED FOR DAILY LIVING?: NO

## 2022-08-04 SDOH — ECONOMIC STABILITY: TRANSPORTATION INSECURITY
IN THE PAST 12 MONTHS, HAS THE LACK OF TRANSPORTATION KEPT YOU FROM MEDICAL APPOINTMENTS OR FROM GETTING MEDICATIONS?: NO

## 2022-08-04 SDOH — ECONOMIC STABILITY: FOOD INSECURITY: WITHIN THE PAST 12 MONTHS, THE FOOD YOU BOUGHT JUST DIDN'T LAST AND YOU DIDN'T HAVE MONEY TO GET MORE.: NEVER TRUE

## 2022-08-04 SDOH — ECONOMIC STABILITY: FOOD INSECURITY: WITHIN THE PAST 12 MONTHS, YOU WORRIED THAT YOUR FOOD WOULD RUN OUT BEFORE YOU GOT MONEY TO BUY MORE.: NEVER TRUE

## 2022-08-04 ASSESSMENT — PATIENT HEALTH QUESTIONNAIRE - PHQ9
SUM OF ALL RESPONSES TO PHQ9 QUESTIONS 1 & 2: 0
2. FEELING DOWN, DEPRESSED OR HOPELESS: 0
SUM OF ALL RESPONSES TO PHQ QUESTIONS 1-9: 0
SUM OF ALL RESPONSES TO PHQ QUESTIONS 1-9: 0
1. LITTLE INTEREST OR PLEASURE IN DOING THINGS: 0
SUM OF ALL RESPONSES TO PHQ QUESTIONS 1-9: 0
SUM OF ALL RESPONSES TO PHQ QUESTIONS 1-9: 0

## 2022-08-04 ASSESSMENT — ENCOUNTER SYMPTOMS
GASTROINTESTINAL NEGATIVE: 1
ABDOMINAL PAIN: 0
RESPIRATORY NEGATIVE: 1

## 2022-08-04 ASSESSMENT — SOCIAL DETERMINANTS OF HEALTH (SDOH): HOW HARD IS IT FOR YOU TO PAY FOR THE VERY BASICS LIKE FOOD, HOUSING, MEDICAL CARE, AND HEATING?: NOT HARD AT ALL

## 2022-08-04 NOTE — PROGRESS NOTES
8/4/22    Beatriz Baron  1965    Chief Complaint   Patient presents with    Consultation     Pt referred by Dr. Akanksha Copeland d/t needing a pap smear. Beatriz Baron is a 60 y.o. female who presents today for evaluation of consult for pap smear. Pt states she completes annual mammograms and breast exams through PCP, other routine cancer screening such as colonoscopy. Denies gynecologic concerns/complaints today, pt says her last pap was about 3-4 years ago. She is not sexually active. Past Medical History:   Diagnosis Date    COPD (chronic obstructive pulmonary disease) ([facility] [address]     Hx of Doppler ultrasound Carotid Duplex Study 02/09/2022    The Left Proximal internal carotid artery exhibits 0-49% stenosis. Normal vertebral flow. Hx of echocardiogram 02/09/2022    EF is 55-60% no wall motion abnormalities. Right side of heart is enlarged. No evidence of pericardial effusion       No past surgical history on file. Social History     Tobacco Use    Smoking status: Former     Packs/day: 1.00     Years: 45.00     Pack years: 45.00     Types: Cigarettes    Smokeless tobacco: Current   Substance Use Topics    Alcohol use: No    Drug use: Yes     Types: Marijuana (Weed)     Comment: occ       Family History   Problem Relation Age of Onset    Breast Cancer Neg Hx     Ovarian Cancer Neg Hx        Current Outpatient Medications   Medication Sig Dispense Refill    atorvastatin (LIPITOR) 40 MG tablet Take 1 tablet by mouth in the morning.  30 tablet 5    aspirin EC 81 MG EC tablet Take 1 tablet by mouth daily 90 tablet 1    albuterol sulfate  (90 Base) MCG/ACT inhaler Inhale 2 puffs into the lungs every 4 hours as needed for Wheezing or Shortness of Breath 18 g 5    furosemide (LASIX) 20 MG tablet Take 1 tablet by mouth daily 60 tablet 3    potassium chloride (KLOR-CON M) 20 MEQ extended release tablet Take 0.5 tablets by mouth daily 30 tablet 1    VENTOLIN  (90 Base) MCG/ACT inhaler Inhale 2 puffs into the lungs 4 times daily (Patient not taking: No sig reported) 1 Inhaler 3    BREO ELLIPTA 100-25 MCG/INH AEPB inhaler  (Patient not taking: No sig reported)  0    omeprazole (PRILOSEC) 10 MG delayed release capsule Take 10 mg by mouth daily (Patient not taking: No sig reported)      SPIRIVA RESPIMAT 1.25 MCG/ACT AERS inhaler inhale 2 puffs INTO THE LUNGS once daily (Patient not taking: No sig reported) 4 g 5    lidocaine (LIDODERM) 5 % apply 1 patch to the affected area daily. Leave on for 12 hours and then off for 12 hours. (Patient not taking: No sig reported)  0    MUCINEX 600 MG extended release tablet take 1 tablet by mouth twice a day 60 tablet 5    cetirizine (ZYRTEC ALLERGY) 10 MG tablet Take 1 tablet by mouth daily (Patient not taking: No sig reported) 30 tablet 5     No current facility-administered medications for this visit. Allergies   Allergen Reactions    Darvocet [Propoxyphene N-Acetaminophen] Hives    Propoxyphene Hives           Immunization History   Administered Date(s) Administered    COVID-19, J&J, (age 18y+), IM, 0.5 mL 2021       Review of Systems   Constitutional: Negative. Negative for fatigue and fever. Respiratory: Negative. Gastrointestinal: Negative. Negative for abdominal pain. Endocrine: Negative. Genitourinary: Negative. Negative for dysuria, frequency, menstrual problem, pelvic pain, vaginal bleeding, vaginal discharge and vaginal pain. Musculoskeletal: Negative. Skin: Negative. Neurological: Negative. Negative for dizziness and headaches. Psychiatric/Behavioral: Negative. /72   Ht 5' 7\" (1.702 m)   Wt 105 lb (47.6 kg)   BMI 16.45 kg/m²     Physical Exam  Vitals and nursing note reviewed. Constitutional:       General: She is not in acute distress. Appearance: Normal appearance. She is normal weight. HENT:      Head: Normocephalic and atraumatic.    Pulmonary:      Effort: Pulmonary effort is normal. No respiratory distress. Genitourinary:     General: Normal vulva. Exam position: Lithotomy position. Comments: Atrophic vaginitis - pt has difficulty tolerating pediatric speculum exam, insertion of pediatric speculum difficult. Vaginal pap obtained  Musculoskeletal:         General: Normal range of motion. Cervical back: Normal range of motion. Skin:     General: Skin is warm and dry. Neurological:      General: No focal deficit present. Mental Status: She is alert and oriented to person, place, and time. Psychiatric:         Mood and Affect: Mood normal.         Speech: Speech normal.         Behavior: Behavior normal. Behavior is cooperative. Thought Content: Thought content normal.         No results found for this visit on 08/04/22. ASSESSMENT AND PLAN   Diagnosis Orders   1. Encounter for Papanicolaou smear of cervix  PAP SMEAR      2. Postmenopausal atrophic vaginitis          Pap    Pt encouraged to call/return if any gynecologic issues arise, no issues today. Continue regular follow-up with PCP    Return if symptoms worsen or fail to improve.     Ayanna Vogt PA-C

## 2022-10-13 NOTE — PROGRESS NOTES
[address]  consulted by Dr. Mayco Johnson for monitoring and adjustment. Indication for treatment: Pneumonia  Goal trough: 15-20 mcg/mL  AUC/JOSE: 400-600    Pertinent Laboratory Values:   Temp Readings from Last 3 Encounters:   12/30/21 98.6 °F (37 °C) (Oral)   03/03/17 98.4 °F (36.9 °C) (Oral)     Recent Labs     12/28/21 2031 12/30/21  0632   WBC 5.9 5.8   LACTATE 1.0  --      Recent Labs     12/28/21 2031 12/30/21  0632   BUN 24* 18   CREATININE 0.7 0.8     Estimated Creatinine Clearance: 60 mL/min (based on SCr of 0.8 mg/dL). No intake or output data in the 24 hours ending 12/30/21 1549    Pertinent Cultures:  Date    Source    Results  12/30   MRSA Screen   Ordered  12/28   Blood    Staph species (1 of 4)   12/30   Blood    Sent     Vancomycin level:   TROUGH:  No results for input(s): VANCOTROUGH in the last 72 hours. RANDOM:  No results for input(s): VANCORANDOM in the last 72 hours. Assessment:  · WBC and temperature: WBC WNL/Afebrile  · SCr, BUN, and urine output: Stable   · Day(s) of therapy: 1   · Vancomycin concentration: to be collected     Plan:  · Vancomycin 750mg q18h   · Pharmacy will continue to monitor patient and adjust therapy as indicated    Linette 3 01/01/22 @0600    Thank you for the consult.   Vidal Black, [facility]  12/30/2021 3:49 PM Sherly

## 2022-10-19 ENCOUNTER — INITIAL CONSULT (OUTPATIENT)
Dept: PULMONOLOGY | Age: 57
End: 2022-10-19
Payer: COMMERCIAL

## 2022-10-19 ENCOUNTER — TELEPHONE (OUTPATIENT)
Dept: PULMONOLOGY | Age: 57
End: 2022-10-19

## 2022-10-19 VITALS
DIASTOLIC BLOOD PRESSURE: 62 MMHG | BODY MASS INDEX: 17.89 KG/M2 | HEART RATE: 85 BPM | OXYGEN SATURATION: 85 % | WEIGHT: 114 LBS | HEIGHT: 67 IN | SYSTOLIC BLOOD PRESSURE: 120 MMHG

## 2022-10-19 DIAGNOSIS — R06.02 SOBOE (SHORTNESS OF BREATH ON EXERTION): ICD-10-CM

## 2022-10-19 DIAGNOSIS — R91.1 RIGHT UPPER LOBE PULMONARY NODULE: ICD-10-CM

## 2022-10-19 DIAGNOSIS — G47.34 SLEEP RELATED HYPOXIA: ICD-10-CM

## 2022-10-19 DIAGNOSIS — G47.10 HYPERSOMNIA: ICD-10-CM

## 2022-10-19 DIAGNOSIS — J44.9 CHRONIC OBSTRUCTIVE PULMONARY DISEASE, UNSPECIFIED COPD TYPE (HCC): ICD-10-CM

## 2022-10-19 DIAGNOSIS — G47.33 OSA (OBSTRUCTIVE SLEEP APNEA): ICD-10-CM

## 2022-10-19 DIAGNOSIS — F17.210 CIGARETTE SMOKER: ICD-10-CM

## 2022-10-19 PROCEDURE — G8427 DOCREV CUR MEDS BY ELIG CLIN: HCPCS | Performed by: INTERNAL MEDICINE

## 2022-10-19 PROCEDURE — 4004F PT TOBACCO SCREEN RCVD TLK: CPT | Performed by: INTERNAL MEDICINE

## 2022-10-19 PROCEDURE — 99205 OFFICE O/P NEW HI 60 MIN: CPT | Performed by: INTERNAL MEDICINE

## 2022-10-19 PROCEDURE — G8484 FLU IMMUNIZE NO ADMIN: HCPCS | Performed by: INTERNAL MEDICINE

## 2022-10-19 PROCEDURE — 3017F COLORECTAL CA SCREEN DOC REV: CPT | Performed by: INTERNAL MEDICINE

## 2022-10-19 PROCEDURE — 3023F SPIROM DOC REV: CPT | Performed by: INTERNAL MEDICINE

## 2022-10-19 PROCEDURE — G8419 CALC BMI OUT NRM PARAM NOF/U: HCPCS | Performed by: INTERNAL MEDICINE

## 2022-10-19 RX ORDER — BUDESONIDE AND FORMOTEROL FUMARATE DIHYDRATE 160; 4.5 UG/1; UG/1
AEROSOL RESPIRATORY (INHALATION)
COMMUNITY
Start: 2022-10-04

## 2022-10-19 ASSESSMENT — ENCOUNTER SYMPTOMS
EYE DISCHARGE: 0
ABDOMINAL PAIN: 0
EYE ITCHING: 0
COUGH: 0
ABDOMINAL DISTENTION: 0
BACK PAIN: 0
SHORTNESS OF BREATH: 1

## 2022-10-19 ASSESSMENT — SLEEP AND FATIGUE QUESTIONNAIRES
ESS TOTAL SCORE: 14
HOW LIKELY ARE YOU TO NOD OFF OR FALL ASLEEP WHILE SITTING AND READING: 2
NECK CIRCUMFERENCE (INCHES): 13
HOW LIKELY ARE YOU TO NOD OFF OR FALL ASLEEP WHILE SITTING AND TALKING TO SOMEONE: 0
HOW LIKELY ARE YOU TO NOD OFF OR FALL ASLEEP WHILE SITTING INACTIVE IN A PUBLIC PLACE: 3
HOW LIKELY ARE YOU TO NOD OFF OR FALL ASLEEP WHEN YOU ARE A PASSENGER IN A CAR FOR AN HOUR WITHOUT A BREAK: 0
HOW LIKELY ARE YOU TO NOD OFF OR FALL ASLEEP WHILE WATCHING TV: 3
HOW LIKELY ARE YOU TO NOD OFF OR FALL ASLEEP WHILE LYING DOWN TO REST IN THE AFTERNOON WHEN CIRCUMSTANCES PERMIT: 3
HOW LIKELY ARE YOU TO NOD OFF OR FALL ASLEEP IN A CAR, WHILE STOPPED FOR A FEW MINUTES IN TRAFFIC: 0
HOW LIKELY ARE YOU TO NOD OFF OR FALL ASLEEP WHILE SITTING QUIETLY AFTER LUNCH WITHOUT ALCOHOL: 3

## 2022-10-19 NOTE — ASSESSMENT & PLAN NOTE
She has symptoms of SANJU  Advised to go for the PSG  Sleep hygiene measures  Advised to avoid back sleep till sleep study  Driving precautions  Medical consequences of untreated

## 2022-10-19 NOTE — PROGRESS NOTES
[facility]  1965  Referring Provider: Alex Crabtree MD    Subjective:     Chief Complaint   Patient presents with    Pulmonary Nodule       HPI  Marissa Duffy is a 59 y.o. female has been referred for the Pulmonary nodule. She had a CT chest done on 12/29/21 which showed:    No convincing evidence of discrete intraluminal filling defect is seen to   suggest underlying acute pulmonary emboli. Imaging features suggestive of underlying congestive heart failure/pulmonary   edema. Moderate size right and trace left pleural effusion. Atelectasis/opacity of the right lower lobe. Correlate clinically with signs   of pneumonia. Follow-up to imaging resolution is recommended. 4 mm nodule in the right upper lobe. If the patient is low risk, this does   not meet the criteria for imaging follow-up. If the patient is high risk,   optional follow-up chest CT in 12 months is recommended. Mild emphysematous   changes of the lungs     She has h/o smoking 1.5 pk/day x 43 yrs and now smoking 4 cigs for the last 1 year. She had no PFT's recently. She has little cough, lot of phlegm in the morning, no hemoptysis, no loss of weight, fair appetite, SOBOE walking 50 feet. She is not on oxygen. She is on Albuterol prn, Symbicort. She had no flu vaccine yet,but had COVID vaccine and Pneumovax too. She is not sure is she snores or stop breathing in the night. She woke choking and gasping for air, woke up with dry mouth and palpitations. She goes to bed at 10:30 PM and gets up at 5:30 AM and she is tired. She has occasional morning headaches. She is sleepy and tired during the day time. She is on 2 L.min of oxygen. Patient is 85% on room air at rest  Patient would benefit from Portable O2    Current Outpatient Medications   Medication Sig Dispense Refill    atorvastatin (LIPITOR) 40 MG tablet Take 1 tablet by mouth in the morning.  30 tablet 5    aspirin EC 81 MG EC tablet Take 1 tablet by mouth daily 90 tablet 1    albuterol sulfate  (90 Base) MCG/ACT inhaler Inhale 2 puffs into the lungs every 4 hours as needed for Wheezing or Shortness of Breath 18 g 5    furosemide (LASIX) 20 MG tablet Take 1 tablet by mouth daily 60 tablet 3    potassium chloride (KLOR-CON M) 20 MEQ extended release tablet Take 0.5 tablets by mouth daily 30 tablet 1    MUCINEX 600 MG extended release tablet take 1 tablet by mouth twice a day 60 tablet 5    SYMBICORT 160-4.5 MCG/ACT AERO inhale 2 puffs by mouth twice a day      omeprazole (PRILOSEC) 10 MG delayed release capsule Take 10 mg by mouth daily (Patient not taking: No sig reported)       No current facility-administered medications for this visit. Allergies   Allergen Reactions    Darvocet [Propoxyphene N-Acetaminophen] Hives    Propoxyphene Hives       Past Medical History:   Diagnosis Date    COPD (chronic obstructive pulmonary disease) ([facility] [address]     Hx of Doppler ultrasound Carotid Duplex Study 02/09/2022    The Left Proximal internal carotid artery exhibits 0-49% stenosis. Normal vertebral flow. Hx of echocardiogram 02/09/2022    EF is 55-60% no wall motion abnormalities. Right side of heart is enlarged. No evidence of pericardial effusion       No past surgical history on file.     Social History     Socioeconomic History    Marital status: Single     Spouse name: None    Number of children: None    Years of education: None    Highest education level: None   Tobacco Use    Smoking status: Former     Packs/day: 1.00     Years: 45.00     Pack years: 45.00     Types: Cigarettes    Smokeless tobacco: Current   Substance and Sexual Activity    Alcohol use: No    Drug use: Yes     Types: Marijuana Gil Mg     Comment: occ     Social Determinants of Health     Financial Resource Strain: Low Risk     Difficulty of Paying Living Expenses: Not hard at all   Food Insecurity: No Food Insecurity    Worried About [address] in the Last Year: Never true    [address] [facility] in the Last Year: Never true   Transportation Needs: No Transportation Needs    Lack of Transportation (Medical): No    Lack of Transportation (Non-Medical): No       Review of Systems   Constitutional:  Positive for fatigue. HENT:  Negative for congestion and postnasal drip. Eyes:  Negative for discharge and itching. Respiratory:  Positive for shortness of breath. Negative for cough. Cardiovascular:  Negative for chest pain and leg swelling. Gastrointestinal:  Negative for abdominal distention and abdominal pain. Endocrine: Negative for cold intolerance and heat intolerance. Genitourinary:  Negative for enuresis and frequency. Musculoskeletal:  Negative for arthralgias and back pain. Allergic/Immunologic: Negative for environmental allergies and food allergies. Neurological:  Negative for light-headedness and headaches. Hematological:  Negative for adenopathy. Psychiatric/Behavioral:  Negative for agitation and behavioral problems. Objective:   /62   Pulse 85   Ht 5' 7\" (1.702 m)   Wt 114 lb (51.7 kg)   SpO2 (!) 85% Comment: room air at rest  BMI 17.85 kg/m²   Body mass index is 17.85 kg/m². Sleep Medicine 10/19/2022   Sitting and reading 2   Watching TV 3   Sitting, inactive in a public place (e.g. a theatre or a meeting) 3   As a passenger in a car for an hour without a break 0   Lying down to rest in the afternoon when circumstances permit 3   Sitting and talking to someone 0   Sitting quietly after a lunch without alcohol 3   In a car, while stopped for a few minutes in traffic 0   Dixon Sleepiness Score 14   Neck circumference (Inches) 13     Mallampati 3    Physical Exam  Vitals reviewed. Constitutional:       Appearance: Normal appearance. HENT:      Head: Normocephalic and atraumatic. Nose: Nose normal.      Mouth/Throat:      Mouth: Mucous membranes are moist.   Eyes:      Extraocular Movements: Extraocular movements intact.       Pupils: Pupils are equal, round, and reactive to light. Cardiovascular:      Rate and Rhythm: Normal rate and regular rhythm. Pulses: Normal pulses. Heart sounds: Normal heart sounds. Pulmonary:      Effort: Pulmonary effort is normal.      Breath sounds: Rhonchi present. Abdominal:      General: Abdomen is flat. Palpations: Abdomen is soft. Musculoskeletal:         General: Normal range of motion. Cervical back: Normal range of motion and neck supple. Skin:     General: Skin is warm and dry. Neurological:      General: No focal deficit present. Mental Status: She is alert and oriented to person, place, and time.    Psychiatric:         Mood and Affect: Mood normal.         Behavior: Behavior normal.       Radiology: reviewed    Assessment and Plan     Problem List          Respiratory    COPD (chronic obstructive pulmonary disease) ([facility] [address]      Advised to quit smoking  C/w Inhalers  C/w O2 at night for now  Get yearly flu vaccine  PFT  Low Dose CT chest         Relevant Medications    MUCINEX 600 MG extended release tablet    albuterol sulfate  (90 Base) MCG/ACT inhaler    SYMBICORT 160-4.5 MCG/ACT AERO    Other Relevant Orders    Full PFT Study With Bronchodilator    SANJU (obstructive sleep apnea)      She has symptoms of SANJU  Advised to go for the PSG  Sleep hygiene measures  Advised to avoid back sleep till sleep study  Driving precautions  Medical consequences of untreated         Relevant Orders    Baseline Diagnostic Sleep Study    Sleep related hypoxia      Quit smoking  C/w Inhalers  C/w 2 L/min of oxygen till we do the PSG            Other    Cigarette smoker      Advised to quit smoking         Relevant Orders    CT LUNG SCREENING    Full PFT Study With Bronchodilator    SOBOE (shortness of breath on exertion)      C/w quitting smoking  C/w Inhalers  Home O2 eval  PFT's         Relevant Orders    Home O2 eval (desaturation screen)    Full PFT Study With Bronchodilator    Hypersomnia She has symptoms of SANJU  Advised to go for the PSG  Sleep hygiene measures  Advised to avoid back sleep till sleep study  Driving precautions  Medical consequences of untreated           Right upper lobe pulmonary nodule      4 mm RUL nodule in a 45 pk yr smoker  I'll do a Low Dose CT chest                 Follow-Up:    Return in about 4 weeks (around 11/16/2022) for PFT, Sleep Study, Low dose CT chest.     Progress notes sent to the referring Provider    Li aRmsey MD MD  10/19/2022  12:35 PM

## 2022-10-19 NOTE — ASSESSMENT & PLAN NOTE
Advised to quit smoking  C/w Inhalers  C/w O2 at night for now  Get yearly flu vaccine  PFT  Low Dose CT chest

## 2022-10-31 ENCOUNTER — HOSPITAL ENCOUNTER (OUTPATIENT)
Dept: PULMONOLOGY | Age: 57
Discharge: HOME | End: 2022-10-31
Payer: COMMERCIAL

## 2022-10-31 DIAGNOSIS — R06.02 SOBOE (SHORTNESS OF BREATH ON EXERTION): ICD-10-CM

## 2022-10-31 DIAGNOSIS — J44.9 CHRONIC OBSTRUCTIVE PULMONARY DISEASE, UNSPECIFIED COPD TYPE (HCC): ICD-10-CM

## 2022-10-31 DIAGNOSIS — F17.210 CIGARETTE SMOKER: ICD-10-CM

## 2022-10-31 LAB
DLCO %PRED: NORMAL %
DLCO PRED: NORMAL
DLCO/VA %PRED: NORMAL
DLCO/VA PRED: NORMAL
DLCO/VA: NORMAL
DLCO: NORMAL
EXPIRATORY TIME-POST: NORMAL
EXPIRATORY TIME: NORMAL
FEF 25-75% %CHNG: NORMAL
FEF 25-75% %PRED-POST: NORMAL
FEF 25-75% %PRED-PRE: NORMAL
FEF 25-75% PRED: NORMAL
FEF 25-75%-POST: NORMAL
FEF 25-75%-PRE: NORMAL
FEV1 %PRED-POST: 24 %
FEV1 %PRED-PRE: 25 %
FEV1 PRED: NORMAL
FEV1-POST: NORMAL
FEV1-PRE: NORMAL
FEV1/FVC %PRED-POST: NORMAL
FEV1/FVC %PRED-PRE: NORMAL
FEV1/FVC PRED: NORMAL
FEV1/FVC-POST: 32 %
FEV1/FVC-PRE: 35 %
FVC %PRED-POST: NORMAL
FVC %PRED-PRE: NORMAL
FVC PRED: NORMAL
FVC-POST: NORMAL
FVC-PRE: NORMAL
GAW %PRED: NORMAL
GAW PRED: NORMAL
GAW: NORMAL
IC %PRED: NORMAL
IC PRED: NORMAL
IC: NORMAL
MEP: NORMAL
MIP: NORMAL
MVV %PRED-PRE: NORMAL
MVV PRED: NORMAL
MVV-PRE: NORMAL
PEF %PRED-POST: NORMAL
PEF %PRED-PRE: NORMAL
PEF PRED: NORMAL
PEF%CHNG: NORMAL
PEF-POST: NORMAL
PEF-PRE: NORMAL
RAW %PRED: NORMAL
RAW PRED: NORMAL
RAW: NORMAL
RV %PRED: NORMAL
RV PRED: NORMAL
RV: NORMAL
SVC %PRED: NORMAL
SVC PRED: NORMAL
SVC: NORMAL
TLC %PRED: 157 %
TLC PRED: NORMAL
TLC: NORMAL
VA %PRED: NORMAL
VA PRED: NORMAL
VA: NORMAL
VTG %PRED: NORMAL
VTG PRED: NORMAL
VTG: NORMAL

## 2022-10-31 PROCEDURE — 94729 DIFFUSING CAPACITY: CPT

## 2022-10-31 PROCEDURE — 94726 PLETHYSMOGRAPHY LUNG VOLUMES: CPT

## 2022-10-31 PROCEDURE — 94060 EVALUATION OF WHEEZING: CPT

## 2022-10-31 ASSESSMENT — PULMONARY FUNCTION TESTS
FEV1_PERCENT_PREDICTED_POST: 24
FEV1/FVC_POST: 32
FEV1/FVC_PRE: 35
FEV1_PERCENT_PREDICTED_PRE: 25

## 2022-12-09 ENCOUNTER — OFFICE VISIT (OUTPATIENT)
Dept: PULMONOLOGY | Age: 57
End: 2022-12-09
Payer: COMMERCIAL

## 2022-12-09 ENCOUNTER — TELEPHONE (OUTPATIENT)
Dept: PULMONOLOGY | Age: 57
End: 2022-12-09

## 2022-12-09 VITALS
DIASTOLIC BLOOD PRESSURE: 64 MMHG | BODY MASS INDEX: 17.04 KG/M2 | WEIGHT: 108.6 LBS | HEART RATE: 77 BPM | OXYGEN SATURATION: 91 % | HEIGHT: 67 IN | SYSTOLIC BLOOD PRESSURE: 106 MMHG

## 2022-12-09 DIAGNOSIS — R06.02 SOBOE (SHORTNESS OF BREATH ON EXERTION): ICD-10-CM

## 2022-12-09 DIAGNOSIS — G47.10 HYPERSOMNIA: ICD-10-CM

## 2022-12-09 DIAGNOSIS — G47.33 OSA (OBSTRUCTIVE SLEEP APNEA): ICD-10-CM

## 2022-12-09 DIAGNOSIS — J44.1 COPD EXACERBATION (HCC): ICD-10-CM

## 2022-12-09 DIAGNOSIS — F17.210 CIGARETTE SMOKER: ICD-10-CM

## 2022-12-09 DIAGNOSIS — G47.34 SLEEP RELATED HYPOXIA: ICD-10-CM

## 2022-12-09 PROCEDURE — 3023F SPIROM DOC REV: CPT | Performed by: INTERNAL MEDICINE

## 2022-12-09 PROCEDURE — G8484 FLU IMMUNIZE NO ADMIN: HCPCS | Performed by: INTERNAL MEDICINE

## 2022-12-09 PROCEDURE — G8419 CALC BMI OUT NRM PARAM NOF/U: HCPCS | Performed by: INTERNAL MEDICINE

## 2022-12-09 PROCEDURE — 99214 OFFICE O/P EST MOD 30 MIN: CPT | Performed by: INTERNAL MEDICINE

## 2022-12-09 PROCEDURE — G8427 DOCREV CUR MEDS BY ELIG CLIN: HCPCS | Performed by: INTERNAL MEDICINE

## 2022-12-09 RX ORDER — LEVOFLOXACIN 500 MG/1
500 TABLET, FILM COATED ORAL DAILY
Qty: 7 TABLET | Refills: 0 | Status: SHIPPED | OUTPATIENT
Start: 2022-12-09 | End: 2022-12-16

## 2022-12-09 RX ORDER — PREDNISONE 10 MG/1
TABLET ORAL
Qty: 20 TABLET | Refills: 0 | Status: SHIPPED | OUTPATIENT
Start: 2022-12-09

## 2022-12-09 ASSESSMENT — ENCOUNTER SYMPTOMS
ABDOMINAL PAIN: 0
EYE DISCHARGE: 0
BACK PAIN: 0
SHORTNESS OF BREATH: 1
COUGH: 1
EYE ITCHING: 0
ABDOMINAL DISTENTION: 0

## 2022-12-09 NOTE — PROGRESS NOTES
Perico Aaron  1965  Referring Provider: Lindsey Noel MD    Subjective:     Chief Complaint   Patient presents with    Follow-up     Pt. Her to go over test results, pt. Had PFT done, still needs CT and SS done. BLAIR Orozco is a 59 y.o. female has come back as a follow up. She has a 65 pk yr smoking and still smoking 1 cig/day. She has cough, phlegm-green to yellow, no hemoptysis, no loss of weight, good appetite, SOBOE walking 30 feet. She is on Albuterol prn and Symbicort. She was given prescription for oxygen but she is not on it now. She had a PFT done on 10/31/22 showed that she has a FEV1 of 0.60 litres(25%), ratio 35 and DCLO could not be obtained. She is awaiting the PSG. Current Outpatient Medications   Medication Sig Dispense Refill    levoFLOXacin (LEVAQUIN) 500 MG tablet Take 1 tablet by mouth daily for 7 days 7 tablet 0    predniSONE (DELTASONE) 10 MG tablet Take 4 tabs x 2 days, 3 tabs x 2 days, 2 tabs x 2 days, 1 tab x 2 days 20 tablet 0    SYMBICORT 160-4.5 MCG/ACT AERO inhale 2 puffs by mouth twice a day      atorvastatin (LIPITOR) 40 MG tablet Take 1 tablet by mouth in the morning. 30 tablet 5    aspirin EC 81 MG EC tablet Take 1 tablet by mouth daily 90 tablet 1    albuterol sulfate  (90 Base) MCG/ACT inhaler Inhale 2 puffs into the lungs every 4 hours as needed for Wheezing or Shortness of Breath 18 g 5    furosemide (LASIX) 20 MG tablet Take 1 tablet by mouth daily 60 tablet 3    potassium chloride (KLOR-CON M) 20 MEQ extended release tablet Take 0.5 tablets by mouth daily 30 tablet 1    MUCINEX 600 MG extended release tablet take 1 tablet by mouth twice a day 60 tablet 5    omeprazole (PRILOSEC) 10 MG delayed release capsule Take 10 mg by mouth daily (Patient not taking: No sig reported)       No current facility-administered medications for this visit.        Allergies   Allergen Reactions    Darvocet [Propoxyphene N-Acetaminophen] Hives    Propoxyphene Hives Past Medical History:   Diagnosis Date    COPD (chronic obstructive pulmonary disease) ([facility] [address]     Hx of Doppler ultrasound Carotid Duplex Study 02/09/2022    The Left Proximal internal carotid artery exhibits 0-49% stenosis. Normal vertebral flow. Hx of echocardiogram 02/09/2022    EF is 55-60% no wall motion abnormalities. Right side of heart is enlarged. No evidence of pericardial effusion       No past surgical history on file. Social History     Socioeconomic History    Marital status: Single     Spouse name: None    Number of children: None    Years of education: None    Highest education level: None   Tobacco Use    Smoking status: Former     Packs/day: 1.00     Years: 45.00     Pack years: 45.00     Types: Cigarettes    Smokeless tobacco: Current   Substance and Sexual Activity    Alcohol use: No    Drug use: Yes     Types: Marijuana [address] [place])     Comment: occ     Social Determinants of Health     Financial Resource Strain: Low Risk     Difficulty of Paying Living Expenses: Not hard at all   Food Insecurity: No Food Insecurity    Worried About [address] in the Last Year: Never true    [address] [place] in the Last Year: Never true   Transportation Needs: No Transportation Needs    Lack of Transportation (Medical): No    Lack of Transportation (Non-Medical): No       Review of Systems   Constitutional:  Positive for fatigue. HENT:  Negative for congestion and postnasal drip. Eyes:  Negative for discharge and itching. Respiratory:  Positive for cough and shortness of breath. Cardiovascular:  Negative for chest pain and leg swelling. Gastrointestinal:  Negative for abdominal distention and abdominal pain. Endocrine: Negative for cold intolerance and heat intolerance. Genitourinary:  Negative for enuresis and frequency. Musculoskeletal:  Negative for arthralgias and back pain. Allergic/Immunologic: Negative for environmental allergies and food allergies.    Neurological: Negative for light-headedness and headaches. Hematological:  Negative for adenopathy. Psychiatric/Behavioral:  Negative for agitation and behavioral problems. Objective:   /64   Pulse 77   Ht 5' 7\" (1.702 m)   Wt 108 lb 9.6 oz (49.3 kg)   SpO2 91%   BMI 17.01 kg/m²   Body mass index is 17.01 kg/m². Sleep Medicine 10/19/2022   Sitting and reading 2   Watching TV 3   Sitting, inactive in a public place (e.g. a theatre or a meeting) 3   As a passenger in a car for an hour without a break 0   Lying down to rest in the afternoon when circumstances permit 3   Sitting and talking to someone 0   Sitting quietly after a lunch without alcohol 3   In a car, while stopped for a few minutes in traffic 0   [place] Sleepiness Score 14   Neck circumference (Inches) 13     Mallampati 3    Physical Exam  Vitals reviewed. Constitutional:       Appearance: Normal appearance. HENT:      Head: Normocephalic and atraumatic. Nose: Nose normal.      Mouth/Throat:      Mouth: Mucous membranes are moist.   Eyes:      Extraocular Movements: Extraocular movements intact. Pupils: Pupils are equal, round, and reactive to light. Cardiovascular:      Rate and Rhythm: Normal rate and regular rhythm. Pulses: Normal pulses. Heart sounds: Normal heart sounds. Pulmonary:      Effort: Pulmonary effort is normal.      Breath sounds: Rhonchi present. Abdominal:      General: Abdomen is flat. Palpations: Abdomen is soft. Musculoskeletal:         General: Normal range of motion. Cervical back: Normal range of motion and neck supple. Skin:     General: Skin is warm and dry. Neurological:      General: No focal deficit present. Mental Status: She is alert and oriented to person, place, and time.    Psychiatric:         Mood and Affect: Mood normal.         Behavior: Behavior normal.       Radiology: None    Assessment and Plan     Problem List          Respiratory    SANJU (obstructive sleep apnea)      Await PSG  Sleep hygiene measures           Sleep related hypoxia      C/w O2 2 L.min at this time          COPD exacerbation (HCC)      Quit smoking  C/w Inhalers  PO Abx  Prednisone taper  C/w oxygen         Relevant Medications    MUCINEX 600 MG extended release tablet    albuterol sulfate  (90 Base) MCG/ACT inhaler    SYMBICORT 160-4.5 MCG/ACT AERO    predniSONE (DELTASONE) 10 MG tablet       Other    Cigarette smoker      Advised to quit smoking         Relevant Orders    Full PFT Study With Bronchodilator    SOBOE (shortness of breath on exertion)      Quit smoking  C.w Inhalers  Abx  Prednisone taper         Relevant Orders    Full PFT Study With Bronchodilator    Hypersomnia      Await PSG                 Follow-Up:    Return in about 4 weeks (around 1/6/2023) for Sleep Study, Low dose CT chest.     Progress notes sent to the referring Provider    Emy Flores MD MD  12/9/2022  11:38 AM

## 2022-12-16 ENCOUNTER — HOSPITAL ENCOUNTER (OUTPATIENT)
Dept: SLEEP CENTER | Age: 57
Discharge: HOME | End: 2022-12-16
Payer: COMMERCIAL

## 2022-12-16 DIAGNOSIS — G47.33 OSA (OBSTRUCTIVE SLEEP APNEA): ICD-10-CM

## 2022-12-16 PROCEDURE — 95810 POLYSOM 6/> YRS 4/> PARAM: CPT

## 2023-01-12 ENCOUNTER — HOSPITAL ENCOUNTER (OUTPATIENT)
Dept: CT IMAGING | Age: 58
Discharge: HOME | End: 2023-01-12
Payer: COMMERCIAL

## 2023-01-12 DIAGNOSIS — R91.1 COIN LESION: ICD-10-CM

## 2023-01-12 PROCEDURE — 71250 CT THORAX DX C-: CPT

## 2023-02-01 ENCOUNTER — HOSPITAL ENCOUNTER (OUTPATIENT)
Dept: PULMONOLOGY | Age: 58
Discharge: HOME | End: 2023-02-01
Payer: COMMERCIAL

## 2023-02-01 ENCOUNTER — HOSPITAL ENCOUNTER (OUTPATIENT)
Dept: CT IMAGING | Age: 58
Discharge: HOME | End: 2023-02-01
Payer: COMMERCIAL

## 2023-02-01 DIAGNOSIS — R06.02 SOBOE (SHORTNESS OF BREATH ON EXERTION): ICD-10-CM

## 2023-02-01 DIAGNOSIS — F17.210 CIGARETTE SMOKER: ICD-10-CM

## 2023-02-01 LAB
DLCO %PRED: NORMAL
DLCO PRED: NORMAL
DLCO/VA %PRED: NORMAL
DLCO/VA PRED: NORMAL
DLCO/VA: NORMAL
DLCO: NORMAL
EXPIRATORY TIME-POST: NORMAL
EXPIRATORY TIME: NORMAL
FEF 25-75% %CHNG: NORMAL
FEF 25-75% %PRED-POST: NORMAL
FEF 25-75% %PRED-PRE: NORMAL
FEF 25-75% PRED: NORMAL
FEF 25-75%-POST: NORMAL
FEF 25-75%-PRE: NORMAL
FEV1 %PRED-POST: NORMAL
FEV1 %PRED-PRE: NORMAL
FEV1 PRED: NORMAL
FEV1-POST: NORMAL
FEV1-PRE: NORMAL
FEV1/FVC %PRED-POST: NORMAL
FEV1/FVC %PRED-PRE: NORMAL
FEV1/FVC PRED: NORMAL
FEV1/FVC-POST: NORMAL
FEV1/FVC-PRE: NORMAL
FVC %PRED-POST: NORMAL
FVC %PRED-PRE: NORMAL
FVC PRED: NORMAL
FVC-POST: NORMAL
FVC-PRE: NORMAL
GAW %PRED: NORMAL
GAW PRED: NORMAL
GAW: NORMAL
IC %PRED: NORMAL
IC PRED: NORMAL
IC: NORMAL
MEP: NORMAL
MIP: NORMAL
MVV %PRED-PRE: NORMAL
MVV PRED: NORMAL
MVV-PRE: NORMAL
PEF %PRED-POST: NORMAL
PEF %PRED-PRE: NORMAL
PEF PRED: NORMAL
PEF%CHNG: NORMAL
PEF-POST: NORMAL
PEF-PRE: NORMAL
RAW %PRED: NORMAL
RAW PRED: NORMAL
RAW: NORMAL
RV %PRED: NORMAL
RV PRED: NORMAL
RV: NORMAL
SVC %PRED: NORMAL
SVC PRED: NORMAL
SVC: NORMAL
TLC %PRED: NORMAL
TLC PRED: NORMAL
TLC: NORMAL
VA %PRED: NORMAL
VA PRED: NORMAL
VA: NORMAL
VTG %PRED: NORMAL
VTG PRED: NORMAL
VTG: NORMAL

## 2023-02-01 PROCEDURE — 71271 CT THORAX LUNG CANCER SCR C-: CPT

## 2023-02-01 NOTE — PROGRESS NOTES
PFT Patient had a complete pft 10/31/2022. Attempted to reach Dr. Kirsten Fortune multiple time. I did talk to his office, they didn't know why he would want another one. Sent patient home. When I do get in contact with him I'll find out what he wants. Pt had a CT two weeks ago and they just did another one today. ??  If he wants the test again I'll call and work her into our schedule.

## 2023-02-06 ENCOUNTER — OFFICE VISIT (OUTPATIENT)
Dept: PULMONOLOGY | Age: 58
End: 2023-02-06
Payer: COMMERCIAL

## 2023-02-06 VITALS
HEART RATE: 66 BPM | WEIGHT: 115.6 LBS | HEIGHT: 67 IN | BODY MASS INDEX: 18.15 KG/M2 | DIASTOLIC BLOOD PRESSURE: 62 MMHG | OXYGEN SATURATION: 94 % | SYSTOLIC BLOOD PRESSURE: 108 MMHG

## 2023-02-06 DIAGNOSIS — R91.1 RIGHT LOWER LOBE PULMONARY NODULE: ICD-10-CM

## 2023-02-06 DIAGNOSIS — R06.02 SOBOE (SHORTNESS OF BREATH ON EXERTION): ICD-10-CM

## 2023-02-06 DIAGNOSIS — G47.10 HYPERSOMNIA: ICD-10-CM

## 2023-02-06 DIAGNOSIS — F17.210 CIGARETTE SMOKER: ICD-10-CM

## 2023-02-06 DIAGNOSIS — J44.9 CHRONIC OBSTRUCTIVE PULMONARY DISEASE, UNSPECIFIED COPD TYPE (HCC): ICD-10-CM

## 2023-02-06 DIAGNOSIS — G47.34 SLEEP RELATED HYPOXIA: ICD-10-CM

## 2023-02-06 DIAGNOSIS — R09.02 HYPOXIA: ICD-10-CM

## 2023-02-06 DIAGNOSIS — G47.33 OSA (OBSTRUCTIVE SLEEP APNEA): ICD-10-CM

## 2023-02-06 PROCEDURE — G8419 CALC BMI OUT NRM PARAM NOF/U: HCPCS | Performed by: INTERNAL MEDICINE

## 2023-02-06 PROCEDURE — 3023F SPIROM DOC REV: CPT | Performed by: INTERNAL MEDICINE

## 2023-02-06 PROCEDURE — 99215 OFFICE O/P EST HI 40 MIN: CPT | Performed by: INTERNAL MEDICINE

## 2023-02-06 PROCEDURE — G8427 DOCREV CUR MEDS BY ELIG CLIN: HCPCS | Performed by: INTERNAL MEDICINE

## 2023-02-06 PROCEDURE — G8484 FLU IMMUNIZE NO ADMIN: HCPCS | Performed by: INTERNAL MEDICINE

## 2023-02-06 RX ORDER — ALBUTEROL SULFATE 90 UG/1
2 AEROSOL, METERED RESPIRATORY (INHALATION) EVERY 4 HOURS PRN
Qty: 18 G | Refills: 5 | Status: SHIPPED | OUTPATIENT
Start: 2023-02-06

## 2023-02-06 ASSESSMENT — ENCOUNTER SYMPTOMS
BACK PAIN: 0
EYE DISCHARGE: 0
SHORTNESS OF BREATH: 1
COUGH: 0
ABDOMINAL DISTENTION: 0
ABDOMINAL PAIN: 0
EYE ITCHING: 0

## 2023-02-06 NOTE — PROGRESS NOTES
Brenda Singleton  1965  Referring Provider: Ruben Rodriguez MD    Subjective:     Chief Complaint   Patient presents with    COPD       HPI  Tristian Polk is a 59 y.o. female has come back as a follow up. She has a 65 pk yr smoking and still smoking 1 cig/day. She is on 1.5 L/min of oxygen. She is on Symbicort and Albuterol prn. She has little cough, phlegm-clear to green,no hemoptysis, no loss of weight, good appetite, SOBOE. She has severe COPD. She had a Low Dose CT chest done on 02/01/23 which showed:    21 x 14 mm irregular nodule within the lateral right lower lobe not   significantly changed from  01/12/2023 and was not seen on 2021 study due to   atelectasis/pleural effusion. Findings could be infectious, inflammatory or   neoplastic in etiology. Consideration for a low-dose CT in 1 month can be   considered versus further evaluation with PET-CT       Her PSG done on 12/16/22 showed that she has no SANJU but desat to 81% requiring 2 L.min of oxygen. She is not on pain medications. Current Outpatient Medications   Medication Sig Dispense Refill    albuterol sulfate HFA (PROVENTIL;VENTOLIN;PROAIR) 108 (90 Base) MCG/ACT inhaler Inhale 2 puffs into the lungs every 4 hours as needed for Wheezing or Shortness of Breath 18 g 5    atorvastatin (LIPITOR) 40 MG tablet Take 1 tablet by mouth in the morning. 30 tablet 5    aspirin EC 81 MG EC tablet Take 1 tablet by mouth daily 90 tablet 1    furosemide (LASIX) 20 MG tablet Take 1 tablet by mouth daily 60 tablet 3    potassium chloride (KLOR-CON M) 20 MEQ extended release tablet Take 0.5 tablets by mouth daily 30 tablet 1    omeprazole (PRILOSEC) 10 MG delayed release capsule Take 10 mg by mouth daily      MUCINEX 600 MG extended release tablet take 1 tablet by mouth twice a day 60 tablet 5    SYMBICORT 160-4.5 MCG/ACT AERO inhale 2 puffs by mouth twice a day       No current facility-administered medications for this visit.        Allergies   Allergen Reactions Darvocet [Propoxyphene N-Acetaminophen] Hives    Propoxyphene Hives       Past Medical History:   Diagnosis Date    COPD (chronic obstructive pulmonary disease) ([facility] [address]     Hx of Doppler ultrasound Carotid Duplex Study 02/09/2022    The Left Proximal internal carotid artery exhibits 0-49% stenosis. Normal vertebral flow. Hx of echocardiogram 02/09/2022    EF is 55-60% no wall motion abnormalities. Right side of heart is enlarged. No evidence of pericardial effusion       No past surgical history on file. Social History     Socioeconomic History    Marital status: Single     Spouse name: None    Number of children: None    Years of education: None    Highest education level: None   Tobacco Use    Smoking status: Every Day     Packs/day: 1.00     Years: 45.00     Pack years: 45.00     Types: Cigarettes     Start date: 1/1/1976    Smokeless tobacco: Never   Substance and Sexual Activity    Alcohol use: No    Drug use: Yes     Types: Marijuana [place])     Comment: occ     Social Determinants of Health     Financial Resource Strain: Low Risk     Difficulty of Paying Living Expenses: Not hard at all   Food Insecurity: No Food Insecurity    Worried About Running Out of Food in the Last Year: Never true    [address] in the Last Year: Never true   Transportation Needs: No Transportation Needs    Lack of Transportation (Medical): No    Lack of Transportation (Non-Medical): No       Review of Systems   Constitutional:  Negative for fatigue. HENT:  Negative for congestion and postnasal drip. Eyes:  Negative for discharge and itching. Respiratory:  Positive for shortness of breath. Negative for cough. Cardiovascular:  Negative for chest pain and leg swelling. Gastrointestinal:  Negative for abdominal distention and abdominal pain. Endocrine: Negative for cold intolerance and heat intolerance. Genitourinary:  Negative for enuresis and frequency. Musculoskeletal:  Negative for arthralgias and back pain. Allergic/Immunologic: Negative for environmental allergies and food allergies. Neurological:  Negative for light-headedness and headaches. Hematological:  Negative for adenopathy. Psychiatric/Behavioral:  Negative for agitation and behavioral problems. Objective:   /62 (Site: Right Upper Arm, Position: Sitting, Cuff Size: Medium Adult)   Pulse 66   Ht 5' 7\" (1.702 m)   Wt 115 lb 9.6 oz (52.4 kg)   SpO2 94%   BMI 18.11 kg/m²   Body mass index is 18.11 kg/m². Sleep Medicine 10/19/2022   Sitting and reading 2   Watching TV 3   Sitting, inactive in a public place (e.g. a theatre or a meeting) 3   As a passenger in a car for an hour without a break 0   Lying down to rest in the afternoon when circumstances permit 3   Sitting and talking to someone 0   Sitting quietly after a lunch without alcohol 3   In a car, while stopped for a few minutes in traffic 0   [place] Sleepiness Score 14   Neck circumference (Inches) 13     Mallampati 2    Physical Exam  Vitals reviewed. Constitutional:       Appearance: Normal appearance. HENT:      Head: Normocephalic and atraumatic. Nose: Nose normal.      Mouth/Throat:      Mouth: Mucous membranes are moist.   Eyes:      Extraocular Movements: Extraocular movements intact. Pupils: Pupils are equal, round, and reactive to light. Cardiovascular:      Rate and Rhythm: Normal rate and regular rhythm. Pulses: Normal pulses. Heart sounds: Normal heart sounds. Pulmonary:      Effort: Pulmonary effort is normal.      Breath sounds: Normal breath sounds. Abdominal:      General: Abdomen is flat. Palpations: Abdomen is soft. Musculoskeletal:         General: Normal range of motion. Cervical back: Normal range of motion and neck supple. Skin:     General: Skin is warm and dry. Neurological:      General: No focal deficit present. Mental Status: She is alert and oriented to person, place, and time.    Psychiatric: Mood and Affect: Mood normal.         Behavior: Behavior normal.       Radiology:   21 x 14 mm irregular nodule within the lateral right lower lobe not   significantly changed from  01/12/2023 and was not seen on 2021 study due to   atelectasis/pleural effusion. Findings could be infectious, inflammatory or   neoplastic in etiology. Consideration for a low-dose CT in 1 month can be   considered versus further evaluation with PET-CT       Assessment and Plan     Problem List          Respiratory    COPD (chronic obstructive pulmonary disease) ([facility] [address]      Advised to quit smoking  PFT and 6 MWT in 1 year  Trelgy   Albuterol prn  Get yearly flu vaccine         Relevant Medications    MUCINEX 600 MG extended release tablet    SYMBICORT 160-4.5 MCG/ACT AERO    albuterol sulfate HFA (PROVENTIL;VENTOLIN;PROAIR) 108 (90 Base) MCG/ACT inhaler    Other Relevant Orders    Full PFT Study With Bronchodilator    6 Minute Walk Test    SANJU (obstructive sleep apnea)      She has no SANJU  But has sleep related hypoxia         Sleep related hypoxia      C/w 2 L.min of oxygen at night time         Hypoxia      Advised to quit smoking  C/w 2 L.min of oxygen on walking            Other    Cigarette smoker      Advised to quit smoking         SOBOE (shortness of breath on exertion)      Advised to quit smoking  PFT in 1 year  6 MWT           Relevant Orders    Full PFT Study With Bronchodilator    6 Minute Walk Test    Hypersomnia      Improved         Right lower lobe pulmonary nodule      She has a 21 x14 mm RLL nodule  Since she is a 65 pk yr smoker  I'll get a PET scan         Relevant Orders    PET CT SKULL BASE TO MID THIGH            Follow-Up:    Return in about 4 weeks (around 3/6/2023) for PET scan.      Progress notes sent to the referring Provider    Leona Baptiste MD MD  2/6/2023  11:56 AM

## 2023-02-07 ENCOUNTER — OFFICE VISIT (OUTPATIENT)
Dept: CARDIOLOGY CLINIC | Age: 58
End: 2023-02-07
Payer: COMMERCIAL

## 2023-02-07 VITALS
BODY MASS INDEX: 18.05 KG/M2 | SYSTOLIC BLOOD PRESSURE: 120 MMHG | DIASTOLIC BLOOD PRESSURE: 60 MMHG | HEIGHT: 67 IN | HEART RATE: 73 BPM | WEIGHT: 115 LBS

## 2023-02-07 DIAGNOSIS — F17.210 CIGARETTE SMOKER: ICD-10-CM

## 2023-02-07 DIAGNOSIS — I50.32 CHRONIC HEART FAILURE WITH PRESERVED EJECTION FRACTION (HCC): ICD-10-CM

## 2023-02-07 DIAGNOSIS — I65.21 OCCLUSION OF RIGHT CAROTID ARTERY: ICD-10-CM

## 2023-02-07 DIAGNOSIS — E78.5 DYSLIPIDEMIA: ICD-10-CM

## 2023-02-07 PROCEDURE — 4004F PT TOBACCO SCREEN RCVD TLK: CPT | Performed by: NURSE PRACTITIONER

## 2023-02-07 PROCEDURE — 99214 OFFICE O/P EST MOD 30 MIN: CPT | Performed by: NURSE PRACTITIONER

## 2023-02-07 PROCEDURE — G8419 CALC BMI OUT NRM PARAM NOF/U: HCPCS | Performed by: NURSE PRACTITIONER

## 2023-02-07 PROCEDURE — 3017F COLORECTAL CA SCREEN DOC REV: CPT | Performed by: NURSE PRACTITIONER

## 2023-02-07 PROCEDURE — G8484 FLU IMMUNIZE NO ADMIN: HCPCS | Performed by: NURSE PRACTITIONER

## 2023-02-07 PROCEDURE — G8427 DOCREV CUR MEDS BY ELIG CLIN: HCPCS | Performed by: NURSE PRACTITIONER

## 2023-02-07 ASSESSMENT — ENCOUNTER SYMPTOMS
ORTHOPNEA: 0
SHORTNESS OF BREATH: 1

## 2023-02-07 NOTE — PROGRESS NOTES
2/7/2023  Primary cardiologist: Dr. Jaden Sandoval:   Danitza Hidalgo  is an established 59 y.o.  female here for a 6 month follow up on HFpEF      SUBJECTIVE/OBJECTIVE:  Danitza Hidalgo is a 59 y.o. female with a history of HFpEF, dilated right-sided chambers, carotid artery stenosis, COPD, centrilobular emphysema, dyslipidemia and anxiety attacks. HPI :   Danitza Hidalgo reports overall she is feeling well. She denies chest pain, headedness or dizziness. She has chronic ongoing shortness of breath. Notes shortness of breath with extended activity and going up a flight of stairs. She is using supplemental oxygen at night. States that she continues to smoke however she is down to 1 cigarette/day and working on stopping. Review of Systems   Constitutional: Negative for diaphoresis and malaise/fatigue. Cardiovascular:  Negative for chest pain, claudication, dyspnea on exertion, irregular heartbeat, leg swelling, near-syncope, orthopnea, palpitations and paroxysmal nocturnal dyspnea. Respiratory:  Positive for shortness of breath. Neurological:  Negative for dizziness and light-headedness. Vitals:    02/07/23 0922   BP: 120/60   Site: Left Upper Arm   Position: Sitting   Cuff Size: Medium Adult   Pulse: 73   Weight: 115 lb (52.2 kg)   Height: 5' 7\" (1.702 m)     No flowsheet data found. Wt Readings from Last 3 Encounters:   02/07/23 115 lb (52.2 kg)   02/06/23 115 lb 9.6 oz (52.4 kg)   12/09/22 108 lb 9.6 oz (49.3 kg)     Body mass index is 18.01 kg/m². Physical Exam  Constitutional:       Appearance: Normal appearance. HENT:      Head: Normocephalic and atraumatic. Eyes:      Extraocular Movements: Extraocular movements intact. Pupils: Pupils are equal, round, and reactive to light. Neck:      Vascular: No carotid bruit. Cardiovascular:      Rate and Rhythm: Normal rate and regular rhythm. Pulses: Normal pulses.    Pulmonary:      Effort: Pulmonary effort is normal.      Breath sounds: Decreased breath sounds present. No rales. Chest:      Chest wall: No tenderness. Abdominal:      General: There is no distension. Palpations: Abdomen is soft. Tenderness: There is no abdominal tenderness. Musculoskeletal:      Cervical back: No tenderness. Right lower leg: No edema. Left lower leg: No edema. Skin:     General: Skin is warm and dry. Capillary Refill: Capillary refill takes less than 2 seconds. Neurological:      General: No focal deficit present. Mental Status: She is alert and oriented to person, place, and time. Psychiatric:         Mood and Affect: Mood normal.         Behavior: Behavior normal.              Current Outpatient Medications   Medication Sig Dispense Refill    albuterol sulfate HFA (PROVENTIL;VENTOLIN;PROAIR) 108 (90 Base) MCG/ACT inhaler Inhale 2 puffs into the lungs every 4 hours as needed for Wheezing or Shortness of Breath 18 g 5    SYMBICORT 160-4.5 MCG/ACT AERO inhale 2 puffs by mouth twice a day      atorvastatin (LIPITOR) 40 MG tablet Take 1 tablet by mouth in the morning. 30 tablet 5    aspirin EC 81 MG EC tablet Take 1 tablet by mouth daily 90 tablet 1    furosemide (LASIX) 20 MG tablet Take 1 tablet by mouth daily 60 tablet 3    potassium chloride (KLOR-CON M) 20 MEQ extended release tablet Take 0.5 tablets by mouth daily 30 tablet 1    MUCINEX 600 MG extended release tablet take 1 tablet by mouth twice a day 60 tablet 5    omeprazole (PRILOSEC) 10 MG delayed release capsule Take 10 mg by mouth daily (Patient not taking: Reported on 2/7/2023)       No current facility-administered medications for this visit. All pertinent data reviewed and discussed with patient  Echocardiogram 02/2022   This is a limited echocardiogram to evaluate LV function. Left ventricular function and size is normal, EF is estimated at 55-60%. Grade I diastolic dysfunction noted. No wall motion abnormalities. Right side of the heart is enlarged.    No evidence of pericardial effusion. ASSESSMENT/PLAN:      HFpEF  Chronic  He is doing well. She has chronic shortness of breath is related to underlying COPD and tobacco use. Clinically appears to be euvolemic. We will continue with furosemide 20 mg once daily along with potassium supplementation. Advised to continue to follow low-sodium diet      Carotid artery stenosis   Know carotid artery stenosis. Denies stroke like symptoms  Recommend ongoing surveillance- tight lipid control  Continue ASA and atorvastatin     Carotid doppler 02/10/2022  Occlusion of the right ICA. Previous exam 09/05/2019. The Left Proximal internal carotid artery exhibits 0-49% stenosis. Normal vertebral flow. Hyperlipidemia   Latest Reference Range & Units 4/11/22 13:24   Cholesterol <200 MG/ (H)   HDL Cholesterol >40 MG/DL 74   LDL Calculated <100 MG/ (H)   Triglycerides <150 MG/DL 69     Started on atorvastatin- goals discussed with patient - goal for LDL 70  Recheck lipids - fasting 12 hours  Continue atorvastatin- denies myalgia     COPD  Chronic shortness of breath  Using oxygen at HS  Continues to smoke - smoking cessation highly encouraged     Tests ordered: lipids   Follow-up  6 months      Signed:  ENZO Seth CNP, 2/7/2023, 9:34 AM    An electronic signature was used to authenticate this note. Please note this report has been partially produced using speech recognition software and may contain errors related to that system including errors in grammar, punctuation, and spelling, as well as words and phrases that may be inappropriate. If there are any questions or concerns please feel free to contact the dictating provider for clarification.

## 2023-02-07 NOTE — PATIENT INSTRUCTIONS
[address] Laboratory Locations - No appointment necessary. Sites open Monday to Friday. Call your preferred location for test preparation, business   hours and other information you need. [facility] accepts ALESS[facility]'s. [address] [place], [address]  Phone: [phone] [place]  [address]  [address] [place], [address]  Phone: [phone]       **It is YOUR responsibilty to bring medication bottles and/or updated medication list to [address] [place] [address]. This will allow us to better serve you and all your healthcare needs**    Please be informed that if you contact our office outside of normal business hours the physician on call cannot help with any scheduling or rescheduling issues, procedure instruction questions or any type of medication issue. We advise you for any urgent/emergency that you go to the nearest emergency room! PLEASE CALL OUR OFFICE DURING NORMAL BUSINESS HOURS    Monday - Friday   8 am to 5 pm    [facility] [address]: [phone]    [place]:  [phone]    Thank you for allowing us to care for you today! We want to ensure we can follow your treatment plan and we strive to give you the best outcomes and experience possible. If you ever have a life threatening emergency and call 911 - for an ambulance (EMS)   Our providers can only care for you at:   [facility] or [facility]. Even if you have someone take you or you drive yourself we can only care for you in a [facility] facility. Our providers are not setup at the other healthcare locations!

## 2023-02-16 ENCOUNTER — HOSPITAL ENCOUNTER (OUTPATIENT)
Age: 58
Discharge: HOME | End: 2023-02-16
Payer: COMMERCIAL

## 2023-02-16 ENCOUNTER — HOSPITAL ENCOUNTER (OUTPATIENT)
Dept: PET IMAGING | Age: 58
Discharge: HOME | End: 2023-02-16
Payer: COMMERCIAL

## 2023-02-16 ENCOUNTER — TELEPHONE (OUTPATIENT)
Dept: CARDIOLOGY CLINIC | Age: 58
End: 2023-02-16

## 2023-02-16 DIAGNOSIS — E78.5 DYSLIPIDEMIA: ICD-10-CM

## 2023-02-16 DIAGNOSIS — R91.1 RIGHT LOWER LOBE PULMONARY NODULE: ICD-10-CM

## 2023-02-16 LAB
CHOLEST SERPL-MCNC: 216 MG/DL
HDLC SERPL-MCNC: 74 MG/DL
LDLC SERPL CALC-MCNC: 124 MG/DL
TRIGL SERPL-MCNC: 88 MG/DL

## 2023-02-16 PROCEDURE — 2580000003 HC RX 258: Performed by: INTERNAL MEDICINE

## 2023-02-16 PROCEDURE — 80061 LIPID PANEL: CPT

## 2023-02-16 PROCEDURE — A9552 F18 FDG: HCPCS | Performed by: INTERNAL MEDICINE

## 2023-02-16 PROCEDURE — 78815 PET IMAGE W/CT SKULL-THIGH: CPT

## 2023-02-16 PROCEDURE — 3430000000 HC RX DIAGNOSTIC RADIOPHARMACEUTICAL: Performed by: INTERNAL MEDICINE

## 2023-02-16 PROCEDURE — 36415 COLL VENOUS BLD VENIPUNCTURE: CPT

## 2023-02-16 RX ORDER — SODIUM CHLORIDE 0.9 % (FLUSH) 0.9 %
10 SYRINGE (ML) INJECTION PRN
Status: COMPLETED | OUTPATIENT
Start: 2023-02-16 | End: 2023-02-16

## 2023-02-16 RX ORDER — FLUDEOXYGLUCOSE F 18 200 MCI/ML
15.43 INJECTION, SOLUTION INTRAVENOUS
Status: COMPLETED | OUTPATIENT
Start: 2023-02-16 | End: 2023-02-16

## 2023-02-16 RX ADMIN — FLUDEOXYGLUCOSE F 18 15.43 MILLICURIE: 200 INJECTION, SOLUTION INTRAVENOUS at 07:59

## 2023-02-16 RX ADMIN — SODIUM CHLORIDE, PRESERVATIVE FREE 10 ML: 5 INJECTION INTRAVENOUS at 07:59

## 2023-02-16 NOTE — TELEPHONE ENCOUNTER
Called and spoke with pt about lipids- still high - need to add zetia- goal it for the LDL to be 70-    Pt agrees to try zetia

## 2023-02-17 RX ORDER — EZETIMIBE 10 MG/1
10 TABLET ORAL DAILY
Qty: 90 TABLET | Refills: 1 | Status: SHIPPED | OUTPATIENT
Start: 2023-02-17

## 2023-02-28 ENCOUNTER — OFFICE VISIT (OUTPATIENT)
Dept: PULMONOLOGY | Age: 58
End: 2023-02-28
Payer: COMMERCIAL

## 2023-02-28 VITALS
RESPIRATION RATE: 16 BRPM | HEART RATE: 98 BPM | WEIGHT: 115 LBS | BODY MASS INDEX: 18.05 KG/M2 | HEIGHT: 67 IN | OXYGEN SATURATION: 91 %

## 2023-02-28 DIAGNOSIS — R91.1 RIGHT LOWER LOBE PULMONARY NODULE: ICD-10-CM

## 2023-02-28 DIAGNOSIS — G47.34 SLEEP RELATED HYPOXIA: ICD-10-CM

## 2023-02-28 DIAGNOSIS — R06.02 SOBOE (SHORTNESS OF BREATH ON EXERTION): ICD-10-CM

## 2023-02-28 DIAGNOSIS — R09.02 HYPOXIA: ICD-10-CM

## 2023-02-28 DIAGNOSIS — F17.210 CIGARETTE SMOKER: ICD-10-CM

## 2023-02-28 DIAGNOSIS — J44.9 CHRONIC OBSTRUCTIVE PULMONARY DISEASE, UNSPECIFIED COPD TYPE (HCC): ICD-10-CM

## 2023-02-28 PROCEDURE — 3023F SPIROM DOC REV: CPT | Performed by: INTERNAL MEDICINE

## 2023-02-28 PROCEDURE — G8427 DOCREV CUR MEDS BY ELIG CLIN: HCPCS | Performed by: INTERNAL MEDICINE

## 2023-02-28 PROCEDURE — G8419 CALC BMI OUT NRM PARAM NOF/U: HCPCS | Performed by: INTERNAL MEDICINE

## 2023-02-28 PROCEDURE — G8484 FLU IMMUNIZE NO ADMIN: HCPCS | Performed by: INTERNAL MEDICINE

## 2023-02-28 PROCEDURE — 99244 OFF/OP CNSLTJ NEW/EST MOD 40: CPT | Performed by: INTERNAL MEDICINE

## 2023-02-28 ASSESSMENT — ENCOUNTER SYMPTOMS
ABDOMINAL DISTENTION: 0
EYE ITCHING: 0
SHORTNESS OF BREATH: 0
BACK PAIN: 0
EYE DISCHARGE: 0
ABDOMINAL PAIN: 0
COUGH: 0

## 2023-02-28 NOTE — PROGRESS NOTES
Annette Sloan  1965  Referring Provider: Sherin Jalloh MD    Subjective:     Chief Complaint   Patient presents with    Results       HPI  Danitza Hidalgo is a 59 y.o. female has come back as a follow up. She has a 65 pk yr smoking and still smoking 1 cig/day. She has is on 1.5 L/min. She is on Symbicort and Albuterol prn. She has severe COPD. She had no flu vaccine but had the COVID vaccine and also Pneumovac. Her CT chest showed a  21 x 14 mm RLL nodule. She had a PET scan done on 02/16/23 which showed: The irregular right lower lobe nodule seen on recent CT demonstrates only   minimal uptake and could be benign. Recommend follow-up low-dose chest CT in   3-6 months. Current Outpatient Medications   Medication Sig Dispense Refill    ezetimibe (ZETIA) 10 MG tablet Take 1 tablet by mouth daily 90 tablet 1    albuterol sulfate HFA (PROVENTIL;VENTOLIN;PROAIR) 108 (90 Base) MCG/ACT inhaler Inhale 2 puffs into the lungs every 4 hours as needed for Wheezing or Shortness of Breath 18 g 5    SYMBICORT 160-4.5 MCG/ACT AERO inhale 2 puffs by mouth twice a day      atorvastatin (LIPITOR) 40 MG tablet Take 1 tablet by mouth in the morning. 30 tablet 5    aspirin EC 81 MG EC tablet Take 1 tablet by mouth daily 90 tablet 1    furosemide (LASIX) 20 MG tablet Take 1 tablet by mouth daily 60 tablet 3    potassium chloride (KLOR-CON M) 20 MEQ extended release tablet Take 0.5 tablets by mouth daily 30 tablet 1    omeprazole (PRILOSEC) 10 MG delayed release capsule Take 10 mg by mouth daily      MUCINEX 600 MG extended release tablet take 1 tablet by mouth twice a day 60 tablet 5     No current facility-administered medications for this visit.        Allergies   Allergen Reactions    Darvocet [Propoxyphene N-Acetaminophen] Hives    Propoxyphene Hives       Past Medical History:   Diagnosis Date    COPD (chronic obstructive pulmonary disease) (HCC)     Hx of Doppler ultrasound Carotid Duplex Study 02/09/2022    The Left Proximal internal carotid artery exhibits 0-49% stenosis. Normal vertebral flow. Hx of echocardiogram 02/09/2022    EF is 55-60% no wall motion abnormalities. Right side of heart is enlarged. No evidence of pericardial effusion       No past surgical history on file. Social History     Socioeconomic History    Marital status: Single     Spouse name: None    Number of children: None    Years of education: None    Highest education level: None   Tobacco Use    Smoking status: Every Day     Packs/day: 1.00     Years: 45.00     Pack years: 45.00     Types: Cigarettes     Start date: 1/1/1976    Smokeless tobacco: Never   Substance and Sexual Activity    Alcohol use: No    Drug use: Yes     Types: Marijuana Lillian Harvey     Comment: occ     Social Determinants of Health     Financial Resource Strain: Low Risk     Difficulty of Paying Living Expenses: Not hard at all   Food Insecurity: No Food Insecurity    Worried About Running Out of Food in the Last Year: Never true    [address] in the Last Year: Never true   Transportation Needs: No Transportation Needs    Lack of Transportation (Medical): No    Lack of Transportation (Non-Medical): No       Review of Systems   Constitutional:  Negative for fatigue. HENT:  Negative for congestion and postnasal drip. Eyes:  Negative for discharge and itching. Respiratory:  Negative for cough and shortness of breath. Cardiovascular:  Negative for chest pain and leg swelling. Gastrointestinal:  Negative for abdominal distention and abdominal pain. Endocrine: Negative for cold intolerance and heat intolerance. Genitourinary:  Negative for enuresis and frequency. Musculoskeletal:  Negative for arthralgias and back pain. Allergic/Immunologic: Negative for environmental allergies and food allergies. Neurological:  Negative for light-headedness and headaches. Hematological:  Negative for adenopathy.    Psychiatric/Behavioral:  Negative for agitation and behavioral problems. Objective:   Pulse 98   Resp 16   Ht 5' 7\" (1.702 m)   Wt 115 lb (52.2 kg)   SpO2 91% Comment: Portable O2 machine on 1.5L while walking  BMI 18.01 kg/m²   Body mass index is 18.01 kg/m². Sleep Medicine 10/19/2022   Sitting and reading 2   Watching TV 3   Sitting, inactive in a public place (e.g. a theatre or a meeting) 3   As a passenger in a car for an hour without a break 0   Lying down to rest in the afternoon when circumstances permit 3   Sitting and talking to someone 0   Sitting quietly after a lunch without alcohol 3   In a car, while stopped for a few minutes in traffic 0   [place] Sleepiness Score 14   Neck circumference (Inches) 13     Mallampati 2    Physical Exam  Vitals reviewed. Constitutional:       Appearance: Normal appearance. HENT:      Head: Normocephalic and atraumatic. Nose: Nose normal.      Mouth/Throat:      Mouth: Mucous membranes are moist.   Eyes:      Extraocular Movements: Extraocular movements intact. Pupils: Pupils are equal, round, and reactive to light. Cardiovascular:      Rate and Rhythm: Normal rate and regular rhythm. Pulses: Normal pulses. Heart sounds: Normal heart sounds. Pulmonary:      Effort: Pulmonary effort is normal.      Breath sounds: Normal breath sounds. Abdominal:      General: Abdomen is flat. Palpations: Abdomen is soft. Musculoskeletal:         General: Normal range of motion. Cervical back: Normal range of motion and neck supple. Skin:     General: Skin is warm and dry. Neurological:      General: No focal deficit present. Mental Status: She is alert and oriented to person, place, and time. Psychiatric:         Mood and Affect: Mood normal.         Behavior: Behavior normal.       Radiology: The irregular right lower lobe nodule seen on recent CT demonstrates only   minimal uptake and could be benign. Recommend follow-up low-dose chest CT in   3-6 months.        Assessment and Plan Problem List          Respiratory    COPD (chronic obstructive pulmonary disease) ([facility])      Quit smoking  C/w inhalers  No flu vaccine per patient         Relevant Medications    MUCINEX 600 MG extended release tablet    SYMBICORT 160-4.5 MCG/ACT AERO    albuterol sulfate HFA (PROVENTIL;VENTOLIN;PROAIR) 108 (90 Base) MCG/ACT inhaler    Sleep related hypoxia      C/w 2 L./min of oxygen         Hypoxia      C/w 1.5 L/min of oxygen            Other    Cigarette smoker      Advised to quit smoking         Relevant Orders    CT CHEST WO CONTRAST    SOBOE (shortness of breath on exertion)      Advised to quit smoking  C/w Inhalers         Right lower lobe pulmonary nodule      No uptake on the PET scan  But I'll repeat the CT chest in 6 months         Relevant Orders    CT CHEST WO CONTRAST            Follow-Up:    Return in about 6 months (around 8/28/2023) for CT chest.     Progress notes sent to the referring Provider    Ximena Marx MD MD  2/28/2023  1:53 PM

## 2023-05-04 RX ORDER — ATORVASTATIN CALCIUM 40 MG/1
40 TABLET, FILM COATED ORAL DAILY
Qty: 30 TABLET | Refills: 5 | Status: SHIPPED | OUTPATIENT
Start: 2023-05-04

## 2023-05-15 ENCOUNTER — HOSPITAL ENCOUNTER (INPATIENT)
Age: 58
LOS: 9 days | Discharge: HOME | DRG: 140 | End: 2023-05-24
Attending: EMERGENCY MEDICINE | Admitting: SPECIALIST
Payer: COMMERCIAL

## 2023-05-15 ENCOUNTER — APPOINTMENT (OUTPATIENT)
Dept: GENERAL RADIOLOGY | Age: 58
DRG: 140 | End: 2023-05-15
Payer: COMMERCIAL

## 2023-05-15 DIAGNOSIS — J44.1 COPD EXACERBATION (HCC): ICD-10-CM

## 2023-05-15 DIAGNOSIS — J96.01 ACUTE RESPIRATORY FAILURE WITH HYPOXIA (HCC): Primary | ICD-10-CM

## 2023-05-15 LAB
ALBUMIN SERPL-MCNC: 4.6 GM/DL (ref 3.4–5)
ALP BLD-CCNC: 101 IU/L (ref 40–129)
ALT SERPL-CCNC: 21 U/L (ref 10–40)
ANION GAP SERPL CALCULATED.3IONS-SCNC: 11 MMOL/L (ref 4–16)
AST SERPL-CCNC: 29 IU/L (ref 15–37)
B PARAP IS1001 DNA NPH QL NAA+NON-PROBE: NOT DETECTED
B PERT.PT PRMT NPH QL NAA+NON-PROBE: NOT DETECTED
BASE EXCESS MIXED: 1.2 (ref 0–2.3)
BASE EXCESS MIXED: 2.4 (ref 0–2.3)
BASE EXCESS MIXED: 3.8 (ref 0–2.3)
BASE EXCESS MIXED: 4.5 (ref 0–2.3)
BASE EXCESS MIXED: 4.9 (ref 0–2.3)
BASOPHILS ABSOLUTE: 0.1 K/CU MM
BASOPHILS RELATIVE PERCENT: 0.7 % (ref 0–1)
BILIRUB SERPL-MCNC: 0.4 MG/DL (ref 0–1)
BUN SERPL-MCNC: 10 MG/DL (ref 6–23)
C PNEUM DNA NPH QL NAA+NON-PROBE: NOT DETECTED
CALCIUM SERPL-MCNC: 9 MG/DL (ref 8.3–10.6)
CHLORIDE BLD-SCNC: 96 MMOL/L (ref 99–110)
CO2: 30 MMOL/L (ref 21–32)
COMMENT: ABNORMAL
CREAT SERPL-MCNC: 0.7 MG/DL (ref 0.6–1.1)
DIFFERENTIAL TYPE: ABNORMAL
EOSINOPHILS ABSOLUTE: 0.2 K/CU MM
EOSINOPHILS RELATIVE PERCENT: 2.2 % (ref 0–3)
FLUAV H1 2009 PAN RNA NPH NAA+NON-PROBE: NOT DETECTED
FLUAV H1 RNA NPH QL NAA+NON-PROBE: NOT DETECTED
FLUAV H3 RNA NPH QL NAA+NON-PROBE: NOT DETECTED
FLUAV RNA NPH QL NAA+NON-PROBE: NOT DETECTED
FLUBV RNA NPH QL NAA+NON-PROBE: NOT DETECTED
GFR SERPL CREATININE-BSD FRML MDRD: >60 ML/MIN/1.73M2
GLUCOSE SERPL-MCNC: 192 MG/DL (ref 70–99)
HADV DNA NPH QL NAA+NON-PROBE: NOT DETECTED
HCO3 VENOUS: 33.4 MMOL/L (ref 19–25)
HCO3 VENOUS: 34.6 MMOL/L (ref 19–25)
HCO3 VENOUS: 35.6 MMOL/L (ref 19–25)
HCO3 VENOUS: 35.7 MMOL/L (ref 19–25)
HCO3 VENOUS: 38 MMOL/L (ref 19–25)
HCOV 229E RNA NPH QL NAA+NON-PROBE: NOT DETECTED
HCOV HKU1 RNA NPH QL NAA+NON-PROBE: NOT DETECTED
HCOV NL63 RNA NPH QL NAA+NON-PROBE: NOT DETECTED
HCOV OC43 RNA NPH QL NAA+NON-PROBE: NOT DETECTED
HCT VFR BLD CALC: 53.9 % (ref 37–47)
HEMOGLOBIN: 16.5 GM/DL (ref 12.5–16)
HMPV RNA NPH QL NAA+NON-PROBE: NOT DETECTED
HPIV1 RNA NPH QL NAA+NON-PROBE: NOT DETECTED
HPIV2 RNA NPH QL NAA+NON-PROBE: NOT DETECTED
HPIV3 RNA NPH QL NAA+NON-PROBE: NOT DETECTED
HPIV4 RNA NPH QL NAA+NON-PROBE: ABNORMAL
IMMATURE NEUTROPHIL %: 0.1 % (ref 0–0.43)
LACTIC ACID, SEPSIS: 1.4 MMOL/L (ref 0.5–1.9)
LACTIC ACID, SEPSIS: 3.8 MMOL/L (ref 0.5–1.9)
LYMPHOCYTES ABSOLUTE: 2 K/CU MM
LYMPHOCYTES RELATIVE PERCENT: 29.4 % (ref 24–44)
M PNEUMO DNA NPH QL NAA+NON-PROBE: NOT DETECTED
MAGNESIUM: 2.3 MG/DL (ref 1.8–2.4)
MCH RBC QN AUTO: 30.1 PG (ref 27–31)
MCHC RBC AUTO-ENTMCNC: 30.6 % (ref 32–36)
MCV RBC AUTO: 98.4 FL (ref 78–100)
MONOCYTES ABSOLUTE: 0.8 K/CU MM
MONOCYTES RELATIVE PERCENT: 12.1 % (ref 0–4)
NUCLEATED RBC %: 0 %
O2 SAT, VEN: 52.3 % (ref 50–70)
O2 SAT, VEN: 79.8 % (ref 50–70)
O2 SAT, VEN: 88.3 % (ref 50–70)
O2 SAT, VEN: 92.1 % (ref 50–70)
O2 SAT, VEN: 94.2 % (ref 50–70)
PCO2, VEN: 112 MMHG (ref 38–52)
PCO2, VEN: 117 MMHG (ref 38–52)
PCO2, VEN: 77 MMHG (ref 38–52)
PCO2, VEN: 98 MMHG (ref 38–52)
PCO2, VEN: 98 MMHG (ref 38–52)
PDW BLD-RTO: 11.9 % (ref 11.7–14.9)
PH VENOUS: 7.11 (ref 7.32–7.42)
PH VENOUS: 7.12 (ref 7.32–7.42)
PH VENOUS: 7.14 (ref 7.32–7.42)
PH VENOUS: 7.17 (ref 7.32–7.42)
PH VENOUS: 7.26 (ref 7.32–7.42)
PLATELET # BLD: 205 K/CU MM (ref 140–440)
PMV BLD AUTO: 12.1 FL (ref 7.5–11.1)
PO2, VEN: 110 MMHG (ref 28–48)
PO2, VEN: 29 MMHG (ref 28–48)
PO2, VEN: 54 MMHG (ref 28–48)
PO2, VEN: 70 MMHG (ref 28–48)
PO2, VEN: 87 MMHG (ref 28–48)
POTASSIUM SERPL-SCNC: 4.5 MMOL/L (ref 3.5–5.1)
PRO-BNP: 350.5 PG/ML
RBC # BLD: 5.48 M/CU MM (ref 4.2–5.4)
RSV RNA NPH QL NAA+NON-PROBE: NOT DETECTED
RV+EV RNA NPH QL NAA+NON-PROBE: NOT DETECTED
SARS-COV-2 RNA NPH QL NAA+NON-PROBE: NOT DETECTED
SEGMENTED NEUTROPHILS ABSOLUTE COUNT: 3.7 K/CU MM
SEGMENTED NEUTROPHILS RELATIVE PERCENT: 55.5 % (ref 36–66)
SODIUM BLD-SCNC: 137 MMOL/L (ref 135–145)
TOTAL IMMATURE NEUTOROPHIL: 0.01 K/CU MM
TOTAL NUCLEATED RBC: 0 K/CU MM
TOTAL PROTEIN: 7.4 GM/DL (ref 6.4–8.2)
TROPONIN T: <0.01 NG/ML
WBC # BLD: 6.7 K/CU MM (ref 4–10.5)

## 2023-05-15 PROCEDURE — 6360000002 HC RX W HCPCS: Performed by: SPECIALIST

## 2023-05-15 PROCEDURE — 2000000000 HC ICU R&B

## 2023-05-15 PROCEDURE — 94761 N-INVAS EAR/PLS OXIMETRY MLT: CPT

## 2023-05-15 PROCEDURE — 80053 COMPREHEN METABOLIC PANEL: CPT

## 2023-05-15 PROCEDURE — 94640 AIRWAY INHALATION TREATMENT: CPT

## 2023-05-15 PROCEDURE — 83605 ASSAY OF LACTIC ACID: CPT

## 2023-05-15 PROCEDURE — 6360000002 HC RX W HCPCS: Performed by: EMERGENCY MEDICINE

## 2023-05-15 PROCEDURE — 85025 COMPLETE CBC W/AUTO DIFF WBC: CPT

## 2023-05-15 PROCEDURE — 2500000003 HC RX 250 WO HCPCS: Performed by: SPECIALIST

## 2023-05-15 PROCEDURE — 83880 ASSAY OF NATRIURETIC PEPTIDE: CPT

## 2023-05-15 PROCEDURE — 84484 ASSAY OF TROPONIN QUANT: CPT

## 2023-05-15 PROCEDURE — 83735 ASSAY OF MAGNESIUM: CPT

## 2023-05-15 PROCEDURE — 0202U NFCT DS 22 TRGT SARS-COV-2: CPT

## 2023-05-15 PROCEDURE — 96375 TX/PRO/DX INJ NEW DRUG ADDON: CPT

## 2023-05-15 PROCEDURE — 99285 EMERGENCY DEPT VISIT HI MDM: CPT

## 2023-05-15 PROCEDURE — 87150 DNA/RNA AMPLIFIED PROBE: CPT

## 2023-05-15 PROCEDURE — 6370000000 HC RX 637 (ALT 250 FOR IP): Performed by: EMERGENCY MEDICINE

## 2023-05-15 PROCEDURE — 2580000003 HC RX 258: Performed by: EMERGENCY MEDICINE

## 2023-05-15 PROCEDURE — 96365 THER/PROPH/DIAG IV INF INIT: CPT

## 2023-05-15 PROCEDURE — 93005 ELECTROCARDIOGRAM TRACING: CPT | Performed by: EMERGENCY MEDICINE

## 2023-05-15 PROCEDURE — 2700000000 HC OXYGEN THERAPY PER DAY

## 2023-05-15 PROCEDURE — 94660 CPAP INITIATION&MGMT: CPT

## 2023-05-15 PROCEDURE — 5A09557 ASSISTANCE WITH RESPIRATORY VENTILATION, GREATER THAN 96 CONSECUTIVE HOURS, CONTINUOUS POSITIVE AIRWAY PRESSURE: ICD-10-PCS | Performed by: STUDENT IN AN ORGANIZED HEALTH CARE EDUCATION/TRAINING PROGRAM

## 2023-05-15 PROCEDURE — 87040 BLOOD CULTURE FOR BACTERIA: CPT

## 2023-05-15 PROCEDURE — 82805 BLOOD GASES W/O2 SATURATION: CPT

## 2023-05-15 PROCEDURE — 2580000003 HC RX 258: Performed by: NURSE PRACTITIONER

## 2023-05-15 PROCEDURE — 71045 X-RAY EXAM CHEST 1 VIEW: CPT

## 2023-05-15 RX ORDER — FAMOTIDINE 10 MG/ML
20 INJECTION, SOLUTION INTRAVENOUS 2 TIMES DAILY
Status: DISCONTINUED | OUTPATIENT
Start: 2023-05-15 | End: 2023-05-18

## 2023-05-15 RX ORDER — SODIUM CHLORIDE 0.9 % (FLUSH) 0.9 %
5-40 SYRINGE (ML) INJECTION PRN
Status: DISCONTINUED | OUTPATIENT
Start: 2023-05-15 | End: 2023-05-24 | Stop reason: HOSPADM

## 2023-05-15 RX ORDER — ALBUTEROL SULFATE 2.5 MG/3ML
5 SOLUTION RESPIRATORY (INHALATION) ONCE
Status: COMPLETED | OUTPATIENT
Start: 2023-05-15 | End: 2023-05-15

## 2023-05-15 RX ORDER — METHYLPREDNISOLONE SODIUM SUCCINATE 125 MG/2ML
60 INJECTION, POWDER, LYOPHILIZED, FOR SOLUTION INTRAMUSCULAR; INTRAVENOUS EVERY 6 HOURS
Status: DISCONTINUED | OUTPATIENT
Start: 2023-05-15 | End: 2023-05-18

## 2023-05-15 RX ORDER — 0.9 % SODIUM CHLORIDE 0.9 %
1000 INTRAVENOUS SOLUTION INTRAVENOUS ONCE
Status: COMPLETED | OUTPATIENT
Start: 2023-05-15 | End: 2023-05-15

## 2023-05-15 RX ORDER — ONDANSETRON 2 MG/ML
4 INJECTION INTRAMUSCULAR; INTRAVENOUS EVERY 6 HOURS PRN
Status: DISCONTINUED | OUTPATIENT
Start: 2023-05-15 | End: 2023-05-24 | Stop reason: HOSPADM

## 2023-05-15 RX ORDER — IPRATROPIUM BROMIDE AND ALBUTEROL SULFATE 2.5; .5 MG/3ML; MG/3ML
2 SOLUTION RESPIRATORY (INHALATION)
Status: COMPLETED | OUTPATIENT
Start: 2023-05-15 | End: 2023-05-15

## 2023-05-15 RX ORDER — ENOXAPARIN SODIUM 100 MG/ML
40 INJECTION SUBCUTANEOUS DAILY
Status: DISCONTINUED | OUTPATIENT
Start: 2023-05-15 | End: 2023-05-17

## 2023-05-15 RX ORDER — ONDANSETRON 4 MG/1
4 TABLET, ORALLY DISINTEGRATING ORAL EVERY 8 HOURS PRN
Status: DISCONTINUED | OUTPATIENT
Start: 2023-05-15 | End: 2023-05-24 | Stop reason: HOSPADM

## 2023-05-15 RX ORDER — SODIUM CHLORIDE 0.9 % (FLUSH) 0.9 %
5-40 SYRINGE (ML) INJECTION EVERY 12 HOURS SCHEDULED
Status: DISCONTINUED | OUTPATIENT
Start: 2023-05-15 | End: 2023-05-24 | Stop reason: HOSPADM

## 2023-05-15 RX ORDER — POLYETHYLENE GLYCOL 3350 17 G/17G
17 POWDER, FOR SOLUTION ORAL DAILY PRN
Status: DISCONTINUED | OUTPATIENT
Start: 2023-05-15 | End: 2023-05-24 | Stop reason: HOSPADM

## 2023-05-15 RX ORDER — DIAZEPAM 5 MG/1
5 TABLET ORAL DAILY PRN
COMMUNITY
Start: 2023-05-04

## 2023-05-15 RX ADMIN — SODIUM CHLORIDE, PRESERVATIVE FREE 10 ML: 5 INJECTION INTRAVENOUS at 22:12

## 2023-05-15 RX ADMIN — METHYLPREDNISOLONE SODIUM SUCCINATE 60 MG: 125 INJECTION, POWDER, FOR SOLUTION INTRAMUSCULAR; INTRAVENOUS at 16:51

## 2023-05-15 RX ADMIN — ALBUTEROL SULFATE 5 MG: 2.5 SOLUTION RESPIRATORY (INHALATION) at 14:36

## 2023-05-15 RX ADMIN — SODIUM CHLORIDE 1000 ML: 9 INJECTION, SOLUTION INTRAVENOUS at 13:14

## 2023-05-15 RX ADMIN — IPRATROPIUM BROMIDE AND ALBUTEROL SULFATE 2 AMPULE: 2.5; .5 SOLUTION RESPIRATORY (INHALATION) at 12:45

## 2023-05-15 RX ADMIN — ENOXAPARIN SODIUM 40 MG: 100 INJECTION SUBCUTANEOUS at 18:16

## 2023-05-15 RX ADMIN — CEFTRIAXONE SODIUM 1000 MG: 1 INJECTION, POWDER, FOR SOLUTION INTRAMUSCULAR; INTRAVENOUS at 14:53

## 2023-05-15 RX ADMIN — FAMOTIDINE 20 MG: 10 INJECTION, SOLUTION INTRAVENOUS at 22:12

## 2023-05-15 RX ADMIN — METHYLPREDNISOLONE SODIUM SUCCINATE 60 MG: 125 INJECTION, POWDER, FOR SOLUTION INTRAMUSCULAR; INTRAVENOUS at 22:11

## 2023-05-15 RX ADMIN — AZITHROMYCIN MONOHYDRATE 500 MG: 500 INJECTION, POWDER, LYOPHILIZED, FOR SOLUTION INTRAVENOUS at 13:52

## 2023-05-15 ASSESSMENT — ENCOUNTER SYMPTOMS
ALLERGIC/IMMUNOLOGIC NEGATIVE: 1
SHORTNESS OF BREATH: 1
EYES NEGATIVE: 1
COUGH: 1
GASTROINTESTINAL NEGATIVE: 1
CHEST TIGHTNESS: 1

## 2023-05-15 ASSESSMENT — PAIN - FUNCTIONAL ASSESSMENT: PAIN_FUNCTIONAL_ASSESSMENT: 0-10

## 2023-05-15 ASSESSMENT — PAIN SCALES - GENERAL: PAINLEVEL_OUTOF10: 0

## 2023-05-16 PROBLEM — E44.0 MODERATE MALNUTRITION (HCC): Status: ACTIVE | Noted: 2023-05-16

## 2023-05-16 LAB
ANION GAP SERPL CALCULATED.3IONS-SCNC: 8 MMOL/L (ref 4–16)
BASE EXCESS MIXED: 6.3 (ref 0–2.3)
BUN SERPL-MCNC: 18 MG/DL (ref 6–23)
CALCIUM SERPL-MCNC: 9 MG/DL (ref 8.3–10.6)
CHLORIDE BLD-SCNC: 100 MMOL/L (ref 99–110)
CO2: 33 MMOL/L (ref 21–32)
COMMENT: ABNORMAL
CREAT SERPL-MCNC: 0.7 MG/DL (ref 0.6–1.1)
GFR SERPL CREATININE-BSD FRML MDRD: >60 ML/MIN/1.73M2
GLUCOSE SERPL-MCNC: 119 MG/DL (ref 70–99)
HCO3 VENOUS: 36.9 MMOL/L (ref 19–25)
MAGNESIUM: 2 MG/DL (ref 1.8–2.4)
O2 SAT, VEN: 63 % (ref 50–70)
PCO2, VEN: 86 MMHG (ref 38–52)
PH VENOUS: 7.24 (ref 7.32–7.42)
PHOSPHORUS: 3.9 MG/DL (ref 2.5–4.9)
PO2, VEN: 37 MMHG (ref 28–48)
POTASSIUM SERPL-SCNC: 5.3 MMOL/L (ref 3.5–5.1)
SODIUM BLD-SCNC: 141 MMOL/L (ref 135–145)

## 2023-05-16 PROCEDURE — 6370000000 HC RX 637 (ALT 250 FOR IP): Performed by: INTERNAL MEDICINE

## 2023-05-16 PROCEDURE — 2580000003 HC RX 258: Performed by: NURSE PRACTITIONER

## 2023-05-16 PROCEDURE — 82805 BLOOD GASES W/O2 SATURATION: CPT

## 2023-05-16 PROCEDURE — 84100 ASSAY OF PHOSPHORUS: CPT

## 2023-05-16 PROCEDURE — 6360000002 HC RX W HCPCS: Performed by: SPECIALIST

## 2023-05-16 PROCEDURE — 94660 CPAP INITIATION&MGMT: CPT

## 2023-05-16 PROCEDURE — 94640 AIRWAY INHALATION TREATMENT: CPT

## 2023-05-16 PROCEDURE — 2000000000 HC ICU R&B

## 2023-05-16 PROCEDURE — 83735 ASSAY OF MAGNESIUM: CPT

## 2023-05-16 PROCEDURE — 2580000003 HC RX 258: Performed by: SPECIALIST

## 2023-05-16 PROCEDURE — 2500000003 HC RX 250 WO HCPCS: Performed by: SPECIALIST

## 2023-05-16 PROCEDURE — 2700000000 HC OXYGEN THERAPY PER DAY

## 2023-05-16 PROCEDURE — 6370000000 HC RX 637 (ALT 250 FOR IP): Performed by: SPECIALIST

## 2023-05-16 PROCEDURE — 94761 N-INVAS EAR/PLS OXIMETRY MLT: CPT

## 2023-05-16 PROCEDURE — 85025 COMPLETE CBC W/AUTO DIFF WBC: CPT

## 2023-05-16 PROCEDURE — 80048 BASIC METABOLIC PNL TOTAL CA: CPT

## 2023-05-16 RX ORDER — IPRATROPIUM BROMIDE AND ALBUTEROL SULFATE 2.5; .5 MG/3ML; MG/3ML
1 SOLUTION RESPIRATORY (INHALATION)
Status: DISCONTINUED | OUTPATIENT
Start: 2023-05-16 | End: 2023-05-19

## 2023-05-16 RX ADMIN — AZITHROMYCIN MONOHYDRATE 500 MG: 500 INJECTION, POWDER, LYOPHILIZED, FOR SOLUTION INTRAVENOUS at 10:52

## 2023-05-16 RX ADMIN — FAMOTIDINE 20 MG: 10 INJECTION, SOLUTION INTRAVENOUS at 09:14

## 2023-05-16 RX ADMIN — ENOXAPARIN SODIUM 40 MG: 100 INJECTION SUBCUTANEOUS at 09:14

## 2023-05-16 RX ADMIN — IPRATROPIUM BROMIDE AND ALBUTEROL SULFATE 1 AMPULE: .5; 3 SOLUTION RESPIRATORY (INHALATION) at 20:21

## 2023-05-16 RX ADMIN — IPRATROPIUM BROMIDE AND ALBUTEROL SULFATE 1 AMPULE: .5; 3 SOLUTION RESPIRATORY (INHALATION) at 16:27

## 2023-05-16 RX ADMIN — SODIUM CHLORIDE, PRESERVATIVE FREE 10 ML: 5 INJECTION INTRAVENOUS at 09:14

## 2023-05-16 RX ADMIN — THEOPHYLLINE ANHYDROUS 200 MG: 200 CAPSULE, EXTENDED RELEASE ORAL at 21:25

## 2023-05-16 RX ADMIN — FAMOTIDINE 20 MG: 10 INJECTION, SOLUTION INTRAVENOUS at 21:25

## 2023-05-16 RX ADMIN — IPRATROPIUM BROMIDE AND ALBUTEROL SULFATE 1 AMPULE: .5; 3 SOLUTION RESPIRATORY (INHALATION) at 12:00

## 2023-05-16 RX ADMIN — METHYLPREDNISOLONE SODIUM SUCCINATE 60 MG: 125 INJECTION, POWDER, FOR SOLUTION INTRAMUSCULAR; INTRAVENOUS at 21:25

## 2023-05-16 RX ADMIN — METHYLPREDNISOLONE SODIUM SUCCINATE 60 MG: 125 INJECTION, POWDER, FOR SOLUTION INTRAMUSCULAR; INTRAVENOUS at 16:43

## 2023-05-16 RX ADMIN — METHYLPREDNISOLONE SODIUM SUCCINATE 60 MG: 125 INJECTION, POWDER, FOR SOLUTION INTRAMUSCULAR; INTRAVENOUS at 09:13

## 2023-05-16 RX ADMIN — CEFTRIAXONE SODIUM 1000 MG: 1 INJECTION, POWDER, FOR SOLUTION INTRAMUSCULAR; INTRAVENOUS at 10:02

## 2023-05-16 RX ADMIN — METHYLPREDNISOLONE SODIUM SUCCINATE 60 MG: 125 INJECTION, POWDER, FOR SOLUTION INTRAMUSCULAR; INTRAVENOUS at 03:03

## 2023-05-16 RX ADMIN — SODIUM CHLORIDE, PRESERVATIVE FREE 10 ML: 5 INJECTION INTRAVENOUS at 21:25

## 2023-05-16 RX ADMIN — THEOPHYLLINE ANHYDROUS 200 MG: 200 CAPSULE, EXTENDED RELEASE ORAL at 13:53

## 2023-05-16 ASSESSMENT — PAIN SCALES - GENERAL: PAINLEVEL_OUTOF10: 0

## 2023-05-17 LAB
ANION GAP SERPL CALCULATED.3IONS-SCNC: 4 MMOL/L (ref 4–16)
BASOPHILS ABSOLUTE: 0 K/CU MM
BASOPHILS RELATIVE PERCENT: 0 % (ref 0–1)
BUN SERPL-MCNC: 19 MG/DL (ref 6–23)
CALCIUM SERPL-MCNC: 7.7 MG/DL (ref 8.3–10.6)
CHLORIDE BLD-SCNC: 100 MMOL/L (ref 99–110)
CO2: 36 MMOL/L (ref 21–32)
CREAT SERPL-MCNC: 0.5 MG/DL (ref 0.6–1.1)
DIFFERENTIAL TYPE: ABNORMAL
EKG ATRIAL RATE: 108 BPM
EKG DIAGNOSIS: NORMAL
EKG P AXIS: 86 DEGREES
EKG P-R INTERVAL: 138 MS
EKG Q-T INTERVAL: 330 MS
EKG QRS DURATION: 84 MS
EKG QTC CALCULATION (BAZETT): 442 MS
EKG R AXIS: 92 DEGREES
EKG T AXIS: 66 DEGREES
EKG VENTRICULAR RATE: 108 BPM
EOSINOPHILS ABSOLUTE: 0 K/CU MM
EOSINOPHILS RELATIVE PERCENT: 0 % (ref 0–3)
GFR SERPL CREATININE-BSD FRML MDRD: >60 ML/MIN/1.73M2
GLUCOSE SERPL-MCNC: 109 MG/DL (ref 70–99)
HCT VFR BLD CALC: 45.3 % (ref 37–47)
HEMOGLOBIN: 13.8 GM/DL (ref 12.5–16)
IMMATURE NEUTROPHIL %: 0.2 % (ref 0–0.43)
LYMPHOCYTES ABSOLUTE: 0.9 K/CU MM
LYMPHOCYTES RELATIVE PERCENT: 13.3 % (ref 24–44)
MAGNESIUM: 1.9 MG/DL (ref 1.8–2.4)
MCH RBC QN AUTO: 30.1 PG (ref 27–31)
MCHC RBC AUTO-ENTMCNC: 30.5 % (ref 32–36)
MCV RBC AUTO: 98.7 FL (ref 78–100)
MONOCYTES ABSOLUTE: 1 K/CU MM
MONOCYTES RELATIVE PERCENT: 15.6 % (ref 0–4)
NUCLEATED RBC %: 0 %
PDW BLD-RTO: 11.9 % (ref 11.7–14.9)
PHOSPHORUS: 2.8 MG/DL (ref 2.5–4.9)
PLATELET # BLD: 149 K/CU MM (ref 140–440)
PMV BLD AUTO: 11.7 FL (ref 7.5–11.1)
POTASSIUM SERPL-SCNC: 4.2 MMOL/L (ref 3.5–5.1)
RBC # BLD: 4.59 M/CU MM (ref 4.2–5.4)
SEGMENTED NEUTROPHILS ABSOLUTE COUNT: 4.6 K/CU MM
SEGMENTED NEUTROPHILS RELATIVE PERCENT: 70.9 % (ref 36–66)
SODIUM BLD-SCNC: 140 MMOL/L (ref 135–145)
TOTAL IMMATURE NEUTOROPHIL: 0.01 K/CU MM
TOTAL NUCLEATED RBC: 0 K/CU MM
WBC # BLD: 6.4 K/CU MM (ref 4–10.5)

## 2023-05-17 PROCEDURE — 80048 BASIC METABOLIC PNL TOTAL CA: CPT

## 2023-05-17 PROCEDURE — 94761 N-INVAS EAR/PLS OXIMETRY MLT: CPT

## 2023-05-17 PROCEDURE — 94640 AIRWAY INHALATION TREATMENT: CPT

## 2023-05-17 PROCEDURE — 93010 ELECTROCARDIOGRAM REPORT: CPT | Performed by: INTERNAL MEDICINE

## 2023-05-17 PROCEDURE — 2000000000 HC ICU R&B

## 2023-05-17 PROCEDURE — 6370000000 HC RX 637 (ALT 250 FOR IP): Performed by: INTERNAL MEDICINE

## 2023-05-17 PROCEDURE — 2700000000 HC OXYGEN THERAPY PER DAY

## 2023-05-17 PROCEDURE — 6370000000 HC RX 637 (ALT 250 FOR IP): Performed by: SPECIALIST

## 2023-05-17 PROCEDURE — 85025 COMPLETE CBC W/AUTO DIFF WBC: CPT

## 2023-05-17 PROCEDURE — 2500000003 HC RX 250 WO HCPCS: Performed by: SPECIALIST

## 2023-05-17 PROCEDURE — 6360000002 HC RX W HCPCS: Performed by: SPECIALIST

## 2023-05-17 PROCEDURE — 83735 ASSAY OF MAGNESIUM: CPT

## 2023-05-17 PROCEDURE — 84100 ASSAY OF PHOSPHORUS: CPT

## 2023-05-17 PROCEDURE — 2580000003 HC RX 258: Performed by: SPECIALIST

## 2023-05-17 PROCEDURE — 2580000003 HC RX 258: Performed by: NURSE PRACTITIONER

## 2023-05-17 RX ORDER — ENOXAPARIN SODIUM 100 MG/ML
30 INJECTION SUBCUTANEOUS DAILY
Status: DISCONTINUED | OUTPATIENT
Start: 2023-05-18 | End: 2023-05-24 | Stop reason: HOSPADM

## 2023-05-17 RX ORDER — ASPIRIN 81 MG/1
81 TABLET, CHEWABLE ORAL DAILY
Status: DISCONTINUED | OUTPATIENT
Start: 2023-05-17 | End: 2023-05-24 | Stop reason: HOSPADM

## 2023-05-17 RX ORDER — ATORVASTATIN CALCIUM 40 MG/1
40 TABLET, FILM COATED ORAL NIGHTLY
Status: DISCONTINUED | OUTPATIENT
Start: 2023-05-17 | End: 2023-05-24 | Stop reason: HOSPADM

## 2023-05-17 RX ADMIN — IPRATROPIUM BROMIDE AND ALBUTEROL SULFATE 1 AMPULE: .5; 3 SOLUTION RESPIRATORY (INHALATION) at 07:35

## 2023-05-17 RX ADMIN — ASPIRIN 81 MG CHEWABLE TABLET 81 MG: 81 TABLET CHEWABLE at 12:04

## 2023-05-17 RX ADMIN — METHYLPREDNISOLONE SODIUM SUCCINATE 60 MG: 125 INJECTION, POWDER, FOR SOLUTION INTRAMUSCULAR; INTRAVENOUS at 09:01

## 2023-05-17 RX ADMIN — THEOPHYLLINE ANHYDROUS 200 MG: 200 CAPSULE, EXTENDED RELEASE ORAL at 09:01

## 2023-05-17 RX ADMIN — THEOPHYLLINE ANHYDROUS 200 MG: 200 CAPSULE, EXTENDED RELEASE ORAL at 20:46

## 2023-05-17 RX ADMIN — METHYLPREDNISOLONE SODIUM SUCCINATE 60 MG: 125 INJECTION, POWDER, FOR SOLUTION INTRAMUSCULAR; INTRAVENOUS at 04:45

## 2023-05-17 RX ADMIN — FAMOTIDINE 20 MG: 10 INJECTION, SOLUTION INTRAVENOUS at 09:01

## 2023-05-17 RX ADMIN — AZITHROMYCIN MONOHYDRATE 500 MG: 500 INJECTION, POWDER, LYOPHILIZED, FOR SOLUTION INTRAVENOUS at 09:57

## 2023-05-17 RX ADMIN — SODIUM CHLORIDE, PRESERVATIVE FREE 10 ML: 5 INJECTION INTRAVENOUS at 09:02

## 2023-05-17 RX ADMIN — IPRATROPIUM BROMIDE AND ALBUTEROL SULFATE 1 AMPULE: .5; 3 SOLUTION RESPIRATORY (INHALATION) at 12:02

## 2023-05-17 RX ADMIN — SODIUM CHLORIDE, PRESERVATIVE FREE 10 ML: 5 INJECTION INTRAVENOUS at 20:47

## 2023-05-17 RX ADMIN — ATORVASTATIN CALCIUM 40 MG: 40 TABLET, FILM COATED ORAL at 20:46

## 2023-05-17 RX ADMIN — METHYLPREDNISOLONE SODIUM SUCCINATE 60 MG: 125 INJECTION, POWDER, FOR SOLUTION INTRAMUSCULAR; INTRAVENOUS at 20:49

## 2023-05-17 RX ADMIN — CEFTRIAXONE SODIUM 1000 MG: 1 INJECTION, POWDER, FOR SOLUTION INTRAMUSCULAR; INTRAVENOUS at 09:13

## 2023-05-17 RX ADMIN — FAMOTIDINE 20 MG: 10 INJECTION, SOLUTION INTRAVENOUS at 20:54

## 2023-05-17 RX ADMIN — ENOXAPARIN SODIUM 40 MG: 100 INJECTION SUBCUTANEOUS at 09:01

## 2023-05-17 RX ADMIN — IPRATROPIUM BROMIDE AND ALBUTEROL SULFATE 1 AMPULE: .5; 3 SOLUTION RESPIRATORY (INHALATION) at 19:39

## 2023-05-17 RX ADMIN — IPRATROPIUM BROMIDE AND ALBUTEROL SULFATE 1 AMPULE: .5; 3 SOLUTION RESPIRATORY (INHALATION) at 15:59

## 2023-05-17 RX ADMIN — METHYLPREDNISOLONE SODIUM SUCCINATE 60 MG: 125 INJECTION, POWDER, FOR SOLUTION INTRAMUSCULAR; INTRAVENOUS at 16:47

## 2023-05-17 ASSESSMENT — ENCOUNTER SYMPTOMS
SHORTNESS OF BREATH: 1
ALLERGIC/IMMUNOLOGIC NEGATIVE: 1
GASTROINTESTINAL NEGATIVE: 1
COUGH: 1
CHEST TIGHTNESS: 1
EYES NEGATIVE: 1

## 2023-05-17 ASSESSMENT — PAIN SCALES - GENERAL: PAINLEVEL_OUTOF10: 0

## 2023-05-18 PROCEDURE — 6360000002 HC RX W HCPCS: Performed by: SPECIALIST

## 2023-05-18 PROCEDURE — 2500000003 HC RX 250 WO HCPCS: Performed by: SPECIALIST

## 2023-05-18 PROCEDURE — 2000000000 HC ICU R&B

## 2023-05-18 PROCEDURE — 6370000000 HC RX 637 (ALT 250 FOR IP): Performed by: SPECIALIST

## 2023-05-18 PROCEDURE — 94640 AIRWAY INHALATION TREATMENT: CPT

## 2023-05-18 PROCEDURE — 2580000003 HC RX 258: Performed by: NURSE PRACTITIONER

## 2023-05-18 PROCEDURE — 2580000003 HC RX 258: Performed by: SPECIALIST

## 2023-05-18 PROCEDURE — 2700000000 HC OXYGEN THERAPY PER DAY

## 2023-05-18 PROCEDURE — 2060000000 HC ICU INTERMEDIATE R&B

## 2023-05-18 PROCEDURE — 94761 N-INVAS EAR/PLS OXIMETRY MLT: CPT

## 2023-05-18 PROCEDURE — 6370000000 HC RX 637 (ALT 250 FOR IP): Performed by: INTERNAL MEDICINE

## 2023-05-18 PROCEDURE — 94660 CPAP INITIATION&MGMT: CPT

## 2023-05-18 RX ORDER — FAMOTIDINE 20 MG/1
20 TABLET, FILM COATED ORAL 2 TIMES DAILY
Status: DISCONTINUED | OUTPATIENT
Start: 2023-05-18 | End: 2023-05-24 | Stop reason: HOSPADM

## 2023-05-18 RX ORDER — PREDNISONE 20 MG/1
40 TABLET ORAL DAILY
Status: COMPLETED | OUTPATIENT
Start: 2023-05-18 | End: 2023-05-24

## 2023-05-18 RX ADMIN — THEOPHYLLINE ANHYDROUS 200 MG: 200 CAPSULE, EXTENDED RELEASE ORAL at 19:59

## 2023-05-18 RX ADMIN — CEFTRIAXONE SODIUM 1000 MG: 1 INJECTION, POWDER, FOR SOLUTION INTRAMUSCULAR; INTRAVENOUS at 09:30

## 2023-05-18 RX ADMIN — IPRATROPIUM BROMIDE AND ALBUTEROL SULFATE 1 AMPULE: .5; 3 SOLUTION RESPIRATORY (INHALATION) at 19:48

## 2023-05-18 RX ADMIN — THEOPHYLLINE ANHYDROUS 200 MG: 200 CAPSULE, EXTENDED RELEASE ORAL at 08:53

## 2023-05-18 RX ADMIN — SODIUM CHLORIDE, PRESERVATIVE FREE 10 ML: 5 INJECTION INTRAVENOUS at 20:00

## 2023-05-18 RX ADMIN — IPRATROPIUM BROMIDE AND ALBUTEROL SULFATE 1 AMPULE: .5; 3 SOLUTION RESPIRATORY (INHALATION) at 12:32

## 2023-05-18 RX ADMIN — ATORVASTATIN CALCIUM 40 MG: 40 TABLET, FILM COATED ORAL at 19:59

## 2023-05-18 RX ADMIN — FAMOTIDINE 20 MG: 10 INJECTION, SOLUTION INTRAVENOUS at 08:54

## 2023-05-18 RX ADMIN — ENOXAPARIN SODIUM 30 MG: 100 INJECTION SUBCUTANEOUS at 08:53

## 2023-05-18 RX ADMIN — FAMOTIDINE 20 MG: 20 TABLET ORAL at 18:03

## 2023-05-18 RX ADMIN — SODIUM CHLORIDE, PRESERVATIVE FREE 10 ML: 5 INJECTION INTRAVENOUS at 08:54

## 2023-05-18 RX ADMIN — METHYLPREDNISOLONE SODIUM SUCCINATE 60 MG: 125 INJECTION, POWDER, FOR SOLUTION INTRAMUSCULAR; INTRAVENOUS at 04:51

## 2023-05-18 RX ADMIN — AZITHROMYCIN MONOHYDRATE 500 MG: 500 INJECTION, POWDER, LYOPHILIZED, FOR SOLUTION INTRAVENOUS at 09:00

## 2023-05-18 RX ADMIN — IPRATROPIUM BROMIDE AND ALBUTEROL SULFATE 1 AMPULE: .5; 3 SOLUTION RESPIRATORY (INHALATION) at 16:53

## 2023-05-18 RX ADMIN — PREDNISONE 40 MG: 20 TABLET ORAL at 12:28

## 2023-05-18 RX ADMIN — METHYLPREDNISOLONE SODIUM SUCCINATE 60 MG: 125 INJECTION, POWDER, FOR SOLUTION INTRAMUSCULAR; INTRAVENOUS at 08:54

## 2023-05-18 RX ADMIN — ASPIRIN 81 MG CHEWABLE TABLET 81 MG: 81 TABLET CHEWABLE at 08:53

## 2023-05-18 RX ADMIN — IPRATROPIUM BROMIDE AND ALBUTEROL SULFATE 1 AMPULE: .5; 3 SOLUTION RESPIRATORY (INHALATION) at 09:11

## 2023-05-18 ASSESSMENT — PAIN SCALES - GENERAL
PAINLEVEL_OUTOF10: 0
PAINLEVEL_OUTOF10: 0

## 2023-05-18 ASSESSMENT — ENCOUNTER SYMPTOMS
COUGH: 1
CHEST TIGHTNESS: 1
ALLERGIC/IMMUNOLOGIC NEGATIVE: 1
SHORTNESS OF BREATH: 1
GASTROINTESTINAL NEGATIVE: 1
EYES NEGATIVE: 1

## 2023-05-19 LAB
ANION GAP SERPL CALCULATED.3IONS-SCNC: 4 MMOL/L (ref 4–16)
BUN SERPL-MCNC: 16 MG/DL (ref 6–23)
CALCIUM SERPL-MCNC: 8.8 MG/DL (ref 8.3–10.6)
CHLORIDE BLD-SCNC: 97 MMOL/L (ref 99–110)
CO2: 43 MMOL/L (ref 21–32)
CREAT SERPL-MCNC: 0.5 MG/DL (ref 0.6–1.1)
CULTURE: ABNORMAL
CULTURE: ABNORMAL
GFR SERPL CREATININE-BSD FRML MDRD: >60 ML/MIN/1.73M2
GLUCOSE SERPL-MCNC: 131 MG/DL (ref 70–99)
HCT VFR BLD CALC: 48.3 % (ref 37–47)
HEMOGLOBIN: 14.3 GM/DL (ref 12.5–16)
Lab: ABNORMAL
MCH RBC QN AUTO: 30.2 PG (ref 27–31)
MCHC RBC AUTO-ENTMCNC: 29.6 % (ref 32–36)
MCV RBC AUTO: 101.9 FL (ref 78–100)
PDW BLD-RTO: 11.6 % (ref 11.7–14.9)
PLATELET # BLD: 161 K/CU MM (ref 140–440)
PMV BLD AUTO: 11.6 FL (ref 7.5–11.1)
POTASSIUM SERPL-SCNC: 4.4 MMOL/L (ref 3.5–5.1)
RBC # BLD: 4.74 M/CU MM (ref 4.2–5.4)
SODIUM BLD-SCNC: 144 MMOL/L (ref 135–145)
SPECIMEN: ABNORMAL
WBC # BLD: 7.1 K/CU MM (ref 4–10.5)

## 2023-05-19 PROCEDURE — 2580000003 HC RX 258: Performed by: NURSE PRACTITIONER

## 2023-05-19 PROCEDURE — 6370000000 HC RX 637 (ALT 250 FOR IP): Performed by: INTERNAL MEDICINE

## 2023-05-19 PROCEDURE — 94761 N-INVAS EAR/PLS OXIMETRY MLT: CPT

## 2023-05-19 PROCEDURE — 6360000002 HC RX W HCPCS: Performed by: SPECIALIST

## 2023-05-19 PROCEDURE — 6370000000 HC RX 637 (ALT 250 FOR IP): Performed by: SPECIALIST

## 2023-05-19 PROCEDURE — 87449 NOS EACH ORGANISM AG IA: CPT

## 2023-05-19 PROCEDURE — 94664 DEMO&/EVAL PT USE INHALER: CPT

## 2023-05-19 PROCEDURE — 80048 BASIC METABOLIC PNL TOTAL CA: CPT

## 2023-05-19 PROCEDURE — 94640 AIRWAY INHALATION TREATMENT: CPT

## 2023-05-19 PROCEDURE — 2060000000 HC ICU INTERMEDIATE R&B

## 2023-05-19 PROCEDURE — 85027 COMPLETE CBC AUTOMATED: CPT

## 2023-05-19 PROCEDURE — 87899 AGENT NOS ASSAY W/OPTIC: CPT

## 2023-05-19 PROCEDURE — 2580000003 HC RX 258: Performed by: SPECIALIST

## 2023-05-19 PROCEDURE — 94660 CPAP INITIATION&MGMT: CPT

## 2023-05-19 PROCEDURE — 2700000000 HC OXYGEN THERAPY PER DAY

## 2023-05-19 RX ORDER — IPRATROPIUM BROMIDE AND ALBUTEROL SULFATE 2.5; .5 MG/3ML; MG/3ML
1 SOLUTION RESPIRATORY (INHALATION) EVERY 4 HOURS
Status: DISCONTINUED | OUTPATIENT
Start: 2023-05-19 | End: 2023-05-22

## 2023-05-19 RX ORDER — IPRATROPIUM BROMIDE AND ALBUTEROL SULFATE 2.5; .5 MG/3ML; MG/3ML
1 SOLUTION RESPIRATORY (INHALATION)
Status: DISCONTINUED | OUTPATIENT
Start: 2023-05-19 | End: 2023-05-24 | Stop reason: HOSPADM

## 2023-05-19 RX ADMIN — SODIUM CHLORIDE, PRESERVATIVE FREE 10 ML: 5 INJECTION INTRAVENOUS at 09:33

## 2023-05-19 RX ADMIN — IPRATROPIUM BROMIDE AND ALBUTEROL SULFATE 1 AMPULE: .5; 3 SOLUTION RESPIRATORY (INHALATION) at 10:31

## 2023-05-19 RX ADMIN — FAMOTIDINE 20 MG: 20 TABLET ORAL at 16:53

## 2023-05-19 RX ADMIN — ATORVASTATIN CALCIUM 40 MG: 40 TABLET, FILM COATED ORAL at 21:00

## 2023-05-19 RX ADMIN — ASPIRIN 81 MG CHEWABLE TABLET 81 MG: 81 TABLET CHEWABLE at 09:33

## 2023-05-19 RX ADMIN — IPRATROPIUM BROMIDE AND ALBUTEROL SULFATE 1 AMPULE: .5; 3 SOLUTION RESPIRATORY (INHALATION) at 15:31

## 2023-05-19 RX ADMIN — SODIUM CHLORIDE, PRESERVATIVE FREE 10 ML: 5 INJECTION INTRAVENOUS at 21:01

## 2023-05-19 RX ADMIN — FAMOTIDINE 20 MG: 20 TABLET ORAL at 09:33

## 2023-05-19 RX ADMIN — IPRATROPIUM BROMIDE AND ALBUTEROL SULFATE 1 AMPULE: .5; 3 SOLUTION RESPIRATORY (INHALATION) at 21:21

## 2023-05-19 RX ADMIN — AZITHROMYCIN MONOHYDRATE 500 MG: 500 INJECTION, POWDER, LYOPHILIZED, FOR SOLUTION INTRAVENOUS at 09:40

## 2023-05-19 RX ADMIN — PREDNISONE 40 MG: 20 TABLET ORAL at 09:33

## 2023-05-19 RX ADMIN — ENOXAPARIN SODIUM 30 MG: 100 INJECTION SUBCUTANEOUS at 09:33

## 2023-05-19 RX ADMIN — CEFTRIAXONE SODIUM 1000 MG: 1 INJECTION, POWDER, FOR SOLUTION INTRAMUSCULAR; INTRAVENOUS at 09:38

## 2023-05-19 RX ADMIN — IPRATROPIUM BROMIDE AND ALBUTEROL SULFATE 1 AMPULE: .5; 3 SOLUTION RESPIRATORY (INHALATION) at 07:53

## 2023-05-19 ASSESSMENT — PAIN SCALES - GENERAL
PAINLEVEL_OUTOF10: 0

## 2023-05-20 ENCOUNTER — APPOINTMENT (OUTPATIENT)
Dept: GENERAL RADIOLOGY | Age: 58
DRG: 140 | End: 2023-05-20
Payer: COMMERCIAL

## 2023-05-20 LAB
BASE EXCESS MIXED: 20.3 (ref 0–2.3)
CARBON MONOXIDE, BLOOD: 2.2 % (ref 0–5)
CO2 CONTENT: 49.7 MMOL/L (ref 19–24)
COMMENT: ABNORMAL
CULTURE: NORMAL
HCO3 ARTERIAL: 47.7 MMOL/L (ref 18–23)
L PNEUMO AG UR QL IA: NEGATIVE
Lab: NORMAL
METHEMOGLOBIN ARTERIAL: 1.3 %
O2 SATURATION: 93.9 % (ref 96–97)
PCO2 ARTERIAL: 64 MMHG (ref 32–45)
PH BLOOD: 7.48 (ref 7.34–7.45)
PO2 ARTERIAL: 77 MMHG (ref 75–100)
S PNEUM AG CSF QL: NORMAL
SPECIMEN: NORMAL

## 2023-05-20 PROCEDURE — 6360000002 HC RX W HCPCS: Performed by: SPECIALIST

## 2023-05-20 PROCEDURE — 71045 X-RAY EXAM CHEST 1 VIEW: CPT

## 2023-05-20 PROCEDURE — 6370000000 HC RX 637 (ALT 250 FOR IP): Performed by: SPECIALIST

## 2023-05-20 PROCEDURE — 2580000003 HC RX 258: Performed by: NURSE PRACTITIONER

## 2023-05-20 PROCEDURE — 36600 WITHDRAWAL OF ARTERIAL BLOOD: CPT

## 2023-05-20 PROCEDURE — 82803 BLOOD GASES ANY COMBINATION: CPT

## 2023-05-20 PROCEDURE — 2580000003 HC RX 258: Performed by: SPECIALIST

## 2023-05-20 PROCEDURE — 2060000000 HC ICU INTERMEDIATE R&B

## 2023-05-20 PROCEDURE — 87040 BLOOD CULTURE FOR BACTERIA: CPT

## 2023-05-20 PROCEDURE — 6370000000 HC RX 637 (ALT 250 FOR IP): Performed by: INTERNAL MEDICINE

## 2023-05-20 PROCEDURE — 2700000000 HC OXYGEN THERAPY PER DAY

## 2023-05-20 PROCEDURE — 94761 N-INVAS EAR/PLS OXIMETRY MLT: CPT

## 2023-05-20 PROCEDURE — 94660 CPAP INITIATION&MGMT: CPT

## 2023-05-20 PROCEDURE — 94640 AIRWAY INHALATION TREATMENT: CPT

## 2023-05-20 RX ADMIN — CEFTRIAXONE SODIUM 1000 MG: 1 INJECTION, POWDER, FOR SOLUTION INTRAMUSCULAR; INTRAVENOUS at 09:15

## 2023-05-20 RX ADMIN — SODIUM CHLORIDE, PRESERVATIVE FREE 10 ML: 5 INJECTION INTRAVENOUS at 20:37

## 2023-05-20 RX ADMIN — PREDNISONE 40 MG: 20 TABLET ORAL at 09:11

## 2023-05-20 RX ADMIN — IPRATROPIUM BROMIDE AND ALBUTEROL SULFATE 1 AMPULE: .5; 3 SOLUTION RESPIRATORY (INHALATION) at 23:30

## 2023-05-20 RX ADMIN — AZITHROMYCIN MONOHYDRATE 500 MG: 500 INJECTION, POWDER, LYOPHILIZED, FOR SOLUTION INTRAVENOUS at 10:57

## 2023-05-20 RX ADMIN — IPRATROPIUM BROMIDE AND ALBUTEROL SULFATE 1 AMPULE: .5; 3 SOLUTION RESPIRATORY (INHALATION) at 07:56

## 2023-05-20 RX ADMIN — IPRATROPIUM BROMIDE AND ALBUTEROL SULFATE 1 AMPULE: .5; 3 SOLUTION RESPIRATORY (INHALATION) at 19:32

## 2023-05-20 RX ADMIN — FAMOTIDINE 20 MG: 20 TABLET ORAL at 17:40

## 2023-05-20 RX ADMIN — SODIUM CHLORIDE, PRESERVATIVE FREE 10 ML: 5 INJECTION INTRAVENOUS at 09:12

## 2023-05-20 RX ADMIN — IPRATROPIUM BROMIDE AND ALBUTEROL SULFATE 1 AMPULE: .5; 3 SOLUTION RESPIRATORY (INHALATION) at 14:37

## 2023-05-20 RX ADMIN — ATORVASTATIN CALCIUM 40 MG: 40 TABLET, FILM COATED ORAL at 20:30

## 2023-05-20 RX ADMIN — FAMOTIDINE 20 MG: 20 TABLET ORAL at 09:12

## 2023-05-20 RX ADMIN — ENOXAPARIN SODIUM 30 MG: 100 INJECTION SUBCUTANEOUS at 09:12

## 2023-05-20 RX ADMIN — ASPIRIN 81 MG CHEWABLE TABLET 81 MG: 81 TABLET CHEWABLE at 09:11

## 2023-05-20 RX ADMIN — SODIUM CHLORIDE, PRESERVATIVE FREE 10 ML: 5 INJECTION INTRAVENOUS at 20:30

## 2023-05-20 RX ADMIN — IPRATROPIUM BROMIDE AND ALBUTEROL SULFATE 1 AMPULE: .5; 3 SOLUTION RESPIRATORY (INHALATION) at 11:11

## 2023-05-20 ASSESSMENT — PAIN SCALES - GENERAL: PAINLEVEL_OUTOF10: 0

## 2023-05-21 PROCEDURE — 6360000002 HC RX W HCPCS: Performed by: SPECIALIST

## 2023-05-21 PROCEDURE — 6370000000 HC RX 637 (ALT 250 FOR IP): Performed by: INTERNAL MEDICINE

## 2023-05-21 PROCEDURE — 94660 CPAP INITIATION&MGMT: CPT

## 2023-05-21 PROCEDURE — 6370000000 HC RX 637 (ALT 250 FOR IP): Performed by: SPECIALIST

## 2023-05-21 PROCEDURE — 2580000003 HC RX 258: Performed by: NURSE PRACTITIONER

## 2023-05-21 PROCEDURE — 2700000000 HC OXYGEN THERAPY PER DAY

## 2023-05-21 PROCEDURE — 94640 AIRWAY INHALATION TREATMENT: CPT

## 2023-05-21 PROCEDURE — 94761 N-INVAS EAR/PLS OXIMETRY MLT: CPT

## 2023-05-21 PROCEDURE — 2140000000 HC CCU INTERMEDIATE R&B

## 2023-05-21 RX ADMIN — ATORVASTATIN CALCIUM 40 MG: 40 TABLET, FILM COATED ORAL at 21:55

## 2023-05-21 RX ADMIN — ENOXAPARIN SODIUM 30 MG: 100 INJECTION SUBCUTANEOUS at 09:18

## 2023-05-21 RX ADMIN — SODIUM CHLORIDE, PRESERVATIVE FREE 10 ML: 5 INJECTION INTRAVENOUS at 21:55

## 2023-05-21 RX ADMIN — IPRATROPIUM BROMIDE AND ALBUTEROL SULFATE 1 AMPULE: .5; 3 SOLUTION RESPIRATORY (INHALATION) at 18:58

## 2023-05-21 RX ADMIN — ASPIRIN 81 MG CHEWABLE TABLET 81 MG: 81 TABLET CHEWABLE at 09:18

## 2023-05-21 RX ADMIN — IPRATROPIUM BROMIDE AND ALBUTEROL SULFATE 1 AMPULE: .5; 3 SOLUTION RESPIRATORY (INHALATION) at 07:58

## 2023-05-21 RX ADMIN — IPRATROPIUM BROMIDE AND ALBUTEROL SULFATE 1 AMPULE: .5; 3 SOLUTION RESPIRATORY (INHALATION) at 11:29

## 2023-05-21 RX ADMIN — FAMOTIDINE 20 MG: 20 TABLET ORAL at 15:53

## 2023-05-21 RX ADMIN — FAMOTIDINE 20 MG: 20 TABLET ORAL at 09:18

## 2023-05-21 RX ADMIN — IPRATROPIUM BROMIDE AND ALBUTEROL SULFATE 1 AMPULE: .5; 3 SOLUTION RESPIRATORY (INHALATION) at 15:32

## 2023-05-21 RX ADMIN — IPRATROPIUM BROMIDE AND ALBUTEROL SULFATE 1 AMPULE: .5; 3 SOLUTION RESPIRATORY (INHALATION) at 03:21

## 2023-05-21 RX ADMIN — SODIUM CHLORIDE, PRESERVATIVE FREE 10 ML: 5 INJECTION INTRAVENOUS at 09:17

## 2023-05-21 RX ADMIN — PREDNISONE 40 MG: 20 TABLET ORAL at 09:18

## 2023-05-22 PROCEDURE — 94761 N-INVAS EAR/PLS OXIMETRY MLT: CPT

## 2023-05-22 PROCEDURE — 6370000000 HC RX 637 (ALT 250 FOR IP): Performed by: SPECIALIST

## 2023-05-22 PROCEDURE — 6360000002 HC RX W HCPCS: Performed by: SPECIALIST

## 2023-05-22 PROCEDURE — 2580000003 HC RX 258: Performed by: NURSE PRACTITIONER

## 2023-05-22 PROCEDURE — 6370000000 HC RX 637 (ALT 250 FOR IP): Performed by: INTERNAL MEDICINE

## 2023-05-22 PROCEDURE — 2700000000 HC OXYGEN THERAPY PER DAY

## 2023-05-22 PROCEDURE — 94618 PULMONARY STRESS TESTING: CPT

## 2023-05-22 PROCEDURE — 2140000000 HC CCU INTERMEDIATE R&B

## 2023-05-22 PROCEDURE — 94640 AIRWAY INHALATION TREATMENT: CPT

## 2023-05-22 RX ORDER — IPRATROPIUM BROMIDE AND ALBUTEROL SULFATE 2.5; .5 MG/3ML; MG/3ML
1 SOLUTION RESPIRATORY (INHALATION)
Status: DISCONTINUED | OUTPATIENT
Start: 2023-05-22 | End: 2023-05-24 | Stop reason: HOSPADM

## 2023-05-22 RX ADMIN — ATORVASTATIN CALCIUM 40 MG: 40 TABLET, FILM COATED ORAL at 21:59

## 2023-05-22 RX ADMIN — ENOXAPARIN SODIUM 30 MG: 100 INJECTION SUBCUTANEOUS at 09:36

## 2023-05-22 RX ADMIN — IPRATROPIUM BROMIDE AND ALBUTEROL SULFATE 1 AMPULE: .5; 3 SOLUTION RESPIRATORY (INHALATION) at 04:35

## 2023-05-22 RX ADMIN — IPRATROPIUM BROMIDE AND ALBUTEROL SULFATE 1 AMPULE: .5; 3 SOLUTION RESPIRATORY (INHALATION) at 07:49

## 2023-05-22 RX ADMIN — IPRATROPIUM BROMIDE AND ALBUTEROL SULFATE 1 AMPULE: .5; 3 SOLUTION RESPIRATORY (INHALATION) at 11:37

## 2023-05-22 RX ADMIN — FAMOTIDINE 20 MG: 20 TABLET ORAL at 17:11

## 2023-05-22 RX ADMIN — ASPIRIN 81 MG CHEWABLE TABLET 81 MG: 81 TABLET CHEWABLE at 09:36

## 2023-05-22 RX ADMIN — FAMOTIDINE 20 MG: 20 TABLET ORAL at 09:36

## 2023-05-22 RX ADMIN — SODIUM CHLORIDE, PRESERVATIVE FREE 10 ML: 5 INJECTION INTRAVENOUS at 21:59

## 2023-05-22 RX ADMIN — SODIUM CHLORIDE, PRESERVATIVE FREE 10 ML: 5 INJECTION INTRAVENOUS at 09:37

## 2023-05-22 RX ADMIN — IPRATROPIUM BROMIDE AND ALBUTEROL SULFATE 1 AMPULE: .5; 3 SOLUTION RESPIRATORY (INHALATION) at 15:27

## 2023-05-22 RX ADMIN — PREDNISONE 40 MG: 20 TABLET ORAL at 09:36

## 2023-05-22 ASSESSMENT — PAIN SCALES - GENERAL: PAINLEVEL_OUTOF10: 0

## 2023-05-22 NOTE — PROGRESS NOTES
Late Entry:    Upon hand off with AM nurse at bedside, Pt was reminded of safety concerns specifically activation of bed alarms. Pt refused the bed alarm as she think she does not need it, and was already re-educated and advised. However, pt still refused and was ready to sign a refusal of treatment form for it. Pt was also reminded of continuous BiPap usage which she refused. To replace she was placed on 6L HF NC and O2 went normal.     Pt had the commode at bedside and was reminded to call for help with the call-light in reach. Pt was advised to be vigilant to report any unusual observations regarding worsening of condition.      Electronically signed by Tonya Angela RN on 5/22/2023 at 1:28 AM

## 2023-05-22 NOTE — PROGRESS NOTES
V2.0    [facility] [place]      Name:  Lester Melendrez /Age/Sex: 1965  (59 y.o. female)   MRN & CSN:  [id number] & [id number] Encounter Date/Time: 2023 7:47 AM EDT   Location:  [address] [place] [address] PCP: MD Cecille Ball 92 Day: 8    Assessment and Recommendations   Lester Melendrez is a 59 y.o. female with pmh of end stage COPD,  chronic resp failure, HTN, Cor pulmonale  and tobacco use disorder who presents with Acute respiratory failure with hypoxia (HCC)      Plan:   #Acute on chronic respiratory failure with hypoxia and hypercapnia  #Advanced, end-stage COPD  --Patient did not tolerate BiPAP overnight, stayed on NC, mentation is improved, she is saturating >90% on 3-4L via NC.   --ABG from 22 showed pH of 7.48, pCO2 64, pO2 77 and HCO3 47.7 and O2 sat 93.9. CXR with chronic COPD changes and no acute cardiopulmonary process  --Breathing treatments standing and PRN, home inhaler  --Pulm on board, appreciate recs, patient to undergo home BiPAP study overnight and can then be D/C'd in the AM  --Completed abx, continue PO steroids D5/7, and cough medication as needed  --Home O2 re-eval ongoing    #Tobacco use disorder  --Reports she was down to 1 cigarette a day, last use was 4 days prior to admission  --Offered NRT, patient would like this prescribed on DC    #HLD  --Continue statin    #? CAD  --Continue ASA and statin    Diet ADULT DIET;  Regular  ADULT ORAL NUTRITION SUPPLEMENT; Lunch, Dinner; Frozen Oral Supplement   DVT Prophylaxis [x] Lovenox, []  Heparin, [] SCDs, [] Ambulation,  [] Eliquis, [] Xarelto  [] Coumadin   Code Status Full Code   Disposition From: Home  Expected Disposition: Home  Estimated Date of Discharge: 1days  Patient requires continued admission due to need to complete BiPAP study and home O2 eval   Surrogate Decision Maker/ POA  Children     Personally reviewed Lab Studies and Imaging       Subjective:     Chief Complaint:

## 2023-05-22 NOTE — PROGRESS NOTES
Patient Room #: 4774/6912-O  Patient Name: Mick Alaniz NIV Evaluation / Orders  1. Notify Pulmonary Diagnostics of order to evaluate for home NIV. 2. Perform the following tests at any point before discharge. [x] Perform an Overnight Oximetry while the patient is on at least                  2 lpm of oxygen. The saturation level must be <  88% for > 5                   cumulative minutes to qualify. [x] Arterial puncture (ABG) for blood gas analysis done while awake. Qualifying result is a PaCO2 >   52 mmHg    3. [x]  I have documented in a progress note that SANJU is not the primary cause of hypercapnia in this patient. CPAP will not effectively treat this patient hypercapnia. BIPA Therapy will benefit and more effectively treat the hypercapnia. Results Documentation    (Attach a copy of Reports)  1. ABG Results       Date __05/24/2023__  PaCO2 ___64_ mmHg on __3  _ lpm oxygen    2. Oximetry Level _,88_% for _08:08 minutes on _2__ lpm oxygen    3. [x] Patient Qualifies      [] Patient Does NOT qualify      Complete order below:  Diagnosis _Severe COPD, CHF, Emphysema________           Length of Need: [x] Lifetime  Home NIV () at:    Inspiratory Setting _12__ cm H2O         Expiratory Setting ___5___ cm H2O    Therapist Signature: ___Patricia Myles RRT _____Date: __05/24/2023__    Physician Signature:   Electronically Signed in EMR_______  Date: _____________    Physician Printed Name: ___Colt Triana MD  NPI:  [id number]____________

## 2023-05-22 NOTE — PROGRESS NOTES
Jayjay Fernández changed to Q4 W/A. She is feeling better and wants to be able to call for treatments during the night.

## 2023-05-22 NOTE — PROGRESS NOTES
5/22/2023 2:27 PM  Patient Room #: 3104/3104-A  Patient Name: Paramjit Forman    (Step 1 Done by RN if possible otherwise call Pulmonary Diagnostics)  Place patient on room air at rest for at least 30 minutes. If patient falls below 88% before 30 minutes then you can record the level and stop. Record room air saturation level _86 %. If patient is at 88% or below, they will qualify for home oxygen and you can stop. If level does not fall below 88%, fill in level above. If indicated continue to Step 2. Signature:_Chapin Huston RRT____ Date: _05/22/2023__  (Step 2&3 Done by P)  Ambulate patient on room air until saturation falls below 89%. Record level of room air saturation with ambulation___ %. Next, place patient back on ___lpm oxygen and ambulate, record level __%. (Note:  this level must show improvement from room air level done with ambulation.)  If patients saturation on room air with ambulation is 88% or below AND patient shows improvement with oxygen during ambulation, they will qualify for home oxygen and you can stop. If patient does not drop below 89%, then patient should have an overnight oximetry trending on room air to see if level falls below 88%. Complete level in Step 3 below. Room air overnight oximetry level 88 % for___  cumulative minutes. If patients room air oxygen level is below <89% for any amount of time they will qualify for nocturnal home oxygen. (Attach Night Trending Report)    Complete order below: Diagnosis:__COPD, CHF, Emphysema__  Home oxygen at:  Length of Need: ?X Lifetime ?  3 Months     _3__lpm or __%   via  [x] nasal cannula  []mask  [] other         [x]continuous [x]  with activity  [x]  Nocturnal   [x] Portable Tanks [x]  Concentrator  [x] Conserving Device        Therapist Signature:_Chapin Huston RRT______     Date:  _05/22/2023__  Physician Signature:  __Electronically Signed in EMR_    Date:___  Physician Printed Name:

## 2023-05-22 NOTE — PROGRESS NOTES
Pulmonary and Critical Care  Progress Note    Subjective: The patient is feeling better. On 3 L N/C. Shortness of breath better. Chest pain none  Addressing respiratory complaints Patient is negative for  hemoptysis and cyanosis  CONSTITUTIONAL:  negative for fevers and chills      Past Medical History:     has a past medical history of COPD (chronic obstructive pulmonary disease) ([address] Hx of Doppler ultrasound Carotid Duplex Study, and Hx of echocardiogram.   has no past surgical history on file. reports that she has been smoking cigarettes. She started smoking about 47 years ago. She has a 45.00 pack-year smoking history. She has never used smokeless tobacco. She reports current drug use. Drug: Marijuana Rich Hover). She reports that she does not drink alcohol. Family history:  family history is not on file. Allergies   Allergen Reactions    Darvocet [Propoxyphene N-Acetaminophen] Hives    Propoxyphene Hives     Social History:    Reviewed; no changes    Objective:   PHYSICAL EXAM:        VITALS:  /64   Pulse 82   Temp 97.7 °F (36.5 °C) (Oral)   Resp 26   Ht 5' 7\" (1.702 m)   Wt 114 lb 3.2 oz (51.8 kg)   SpO2 97%   BMI 17.89 kg/m²     24HR INTAKE/OUTPUT:    Intake/Output Summary (Last 24 hours) at 5/22/2023 1105  Last data filed at 5/21/2023 2155  Gross per 24 hour   Intake 130 ml   Output --   Net 130 ml       CONSTITUTIONAL:  awake and responsive. LUNGS:  decreased breath sounds. CARDIOVASCULAR:  normal S1 and S2 and negative JVD  ABD:Abdomen soft, non-tender. BS normal. No masses,  No organomegaly  NEURO:Alert. DATA:    CBC:  No results for input(s): WBC, RBC, HGB, HCT, PLT, MCV, MCH, MCHC, RDW, NRBC, SEGSPCT, BANDSPCT in the last 72 hours. BMP:  No results for input(s): NA, K, CL, CO2, BUN, CREATININE, CALCIUM, GLUCOSE in the last 72 hours.      ABG:  Recent Labs     05/20/23  1430   PH 7.48*   PO2ART 77   BPO3PNZ 64.0*   O2SAT 93.9*     Lab Results   Component Value Date    PROBNP

## 2023-05-22 NOTE — PROGRESS NOTES
Pt's SpO2 while sitting and resting was 88% on  2 lpm oxygen via nasal canula. Pt's SpO2 while sitting and resting was 90% on  3 lpm oxygen via nasal canula. Pt's SpO2 was 86% while ambulating on 3 lpm, 4 lpm, 5 lpm and 6 lpm oxygen via nasal canula. Pt's SpO2 was 90% while ambulating on 8 lpm oxygen via nasal canula.

## 2023-05-22 NOTE — PROGRESS NOTES
Pt home O2 paperwork faxed to [facility]. Please do not discharge pt without oxygen. This testing will be good for 48 hours and will have to be repeated if pt has not discharged prior to then. If portable oxygen tank has not been delivered prior to pt being ready to leave, please call PFT @ [phone] or Respiratory Therapy @ [phone]. Thanks.

## 2023-05-22 NOTE — PROGRESS NOTES
Patient was seen in hospital for  COPD, CHF, Emphysema . I am prescribing oxygen because the diagnosis and testing requires the patient to have oxygen in the home. Conditions will improve or be benefited by oxygen use. The patient is able to perform good mobility and therefore requires the use of a portable oxygen system for ambulation.

## 2023-05-22 NOTE — CARE COORDINATION
CM met with pt for a f/u visit. Pt states that she is still planning to return home alone with family support. [facility] offered and pt declined. She states that her 2 daughter in laws are very good about helping her if needed. Pt denies any d/c needs. D/c plan is home alone, no needs. A shower chair was ordered from [id number] [facility]. Called Brenda/elsy [facility] to verify. Pt's family will be able to  shower chair when pt is discharged. Notify CM if any d/c needs arise.   TE

## 2023-05-23 PROCEDURE — 2140000000 HC CCU INTERMEDIATE R&B

## 2023-05-23 PROCEDURE — 2580000003 HC RX 258: Performed by: NURSE PRACTITIONER

## 2023-05-23 PROCEDURE — 6360000002 HC RX W HCPCS: Performed by: SPECIALIST

## 2023-05-23 PROCEDURE — 2700000000 HC OXYGEN THERAPY PER DAY

## 2023-05-23 PROCEDURE — 6370000000 HC RX 637 (ALT 250 FOR IP): Performed by: SPECIALIST

## 2023-05-23 PROCEDURE — 94640 AIRWAY INHALATION TREATMENT: CPT

## 2023-05-23 PROCEDURE — 6370000000 HC RX 637 (ALT 250 FOR IP): Performed by: STUDENT IN AN ORGANIZED HEALTH CARE EDUCATION/TRAINING PROGRAM

## 2023-05-23 PROCEDURE — 94761 N-INVAS EAR/PLS OXIMETRY MLT: CPT

## 2023-05-23 PROCEDURE — 6370000000 HC RX 637 (ALT 250 FOR IP): Performed by: INTERNAL MEDICINE

## 2023-05-23 RX ORDER — DIAZEPAM 5 MG/1
5 TABLET ORAL EVERY 12 HOURS PRN
Status: DISCONTINUED | OUTPATIENT
Start: 2023-05-23 | End: 2023-05-24 | Stop reason: HOSPADM

## 2023-05-23 RX ADMIN — PREDNISONE 40 MG: 20 TABLET ORAL at 08:46

## 2023-05-23 RX ADMIN — FAMOTIDINE 20 MG: 20 TABLET ORAL at 08:46

## 2023-05-23 RX ADMIN — IPRATROPIUM BROMIDE AND ALBUTEROL SULFATE 1 AMPULE: .5; 3 SOLUTION RESPIRATORY (INHALATION) at 11:03

## 2023-05-23 RX ADMIN — SODIUM CHLORIDE, PRESERVATIVE FREE 10 ML: 5 INJECTION INTRAVENOUS at 08:47

## 2023-05-23 RX ADMIN — SODIUM CHLORIDE, PRESERVATIVE FREE 10 ML: 5 INJECTION INTRAVENOUS at 21:09

## 2023-05-23 RX ADMIN — ENOXAPARIN SODIUM 30 MG: 100 INJECTION SUBCUTANEOUS at 08:46

## 2023-05-23 RX ADMIN — IPRATROPIUM BROMIDE AND ALBUTEROL SULFATE 1 AMPULE: .5; 3 SOLUTION RESPIRATORY (INHALATION) at 15:05

## 2023-05-23 RX ADMIN — DIAZEPAM 5 MG: 5 TABLET ORAL at 14:51

## 2023-05-23 RX ADMIN — IPRATROPIUM BROMIDE AND ALBUTEROL SULFATE 1 AMPULE: .5; 3 SOLUTION RESPIRATORY (INHALATION) at 06:53

## 2023-05-23 RX ADMIN — ASPIRIN 81 MG CHEWABLE TABLET 81 MG: 81 TABLET CHEWABLE at 08:46

## 2023-05-23 RX ADMIN — ATORVASTATIN CALCIUM 40 MG: 40 TABLET, FILM COATED ORAL at 21:08

## 2023-05-23 RX ADMIN — IPRATROPIUM BROMIDE AND ALBUTEROL SULFATE 1 AMPULE: .5; 3 SOLUTION RESPIRATORY (INHALATION) at 19:41

## 2023-05-23 RX ADMIN — FAMOTIDINE 20 MG: 20 TABLET ORAL at 17:18

## 2023-05-23 ASSESSMENT — PAIN SCALES - GENERAL
PAINLEVEL_OUTOF10: 0

## 2023-05-23 NOTE — PROGRESS NOTES
05/23/23 1032   Encounter Summary   Encounter Overview/Reason  Initial Encounter   Service Provided For: Patient   Referral/Consult From: [facility]   Support System Children   Last Encounter  05/23/23   Complexity of Encounter Low   Begin Time 1015   End Time  1033   Total Time Calculated 18 min   Encounter    Type Initial Screen/Assessment   Spiritual/Emotional needs   Type Spiritual Support   Grief, Loss, and Adjustments   Type Adjustment to illness   Assessment/Intervention/Outcome   Assessment Coping; Hopeful;Peaceful   Intervention Active listening;Discussed belief system/Anglican practices/leandro;Empowerment;Sustaining Presence/Ministry of presence   Outcome Coping;Encouraged;Engaged in conversation;Expressed Gratitude; Optimistic   Plan and Referrals   Plan/Referrals Continue Support (comment)

## 2023-05-23 NOTE — PROGRESS NOTES
V2.0    [facility] Progress Note      Name:  Oral Aschoff /Age/Sex: 1965  (59 y.o. female)   MRN & CSN:  [id number] & [id number] Encounter Date/Time: 2023 7:47 AM EDT   Location:  [address] [place] [address] PCP: Karyn Luciano MD     Attending:Maldonado Cronin, *       Hospital Day: 9    Assessment and Recommendations   Oral Aschoff is a 59 y.o. female with pmh of end stage COPD,  chronic resp failure, HTN, Cor pulmonale  and tobacco use disorder who presents with Acute respiratory failure with hypoxia (HCC)      Plan:   #Acute on chronic respiratory failure with hypoxia and hypercapnia  #Advanced, end-stage COPD  --Patient did not tolerate BiPAP overnight, multiple breaks in use, mentation is improved, she is saturating 98% on 5L via NC.   --ABG from yesterday showed pH of 7.48, pCO2 64, pO2 77 and HCO3 47.7 and O2 sat 93.9. CXR with chronic COPD changes and no acute cardiopulmonary process  --Breathing treatments standing and PRN, home inhaler  --Pulm on board, appreciate recs  --Completed abx, continue PO steroids,and cough medication as needed  - Needs home BiPAP eval tonight    #Tobacco use disorder  --Reports she was down to 1 cigarette a day, last use was 4 days prior to admission  --Offered NRT    #HLD  --Continue statin    #? CAD  --Continue ASA and statin    Diet ADULT DIET; Regular  ADULT ORAL NUTRITION SUPPLEMENT; Lunch, Dinner; Frozen Oral Supplement   DVT Prophylaxis [x] Lovenox, []  Heparin, [] SCDs, [] Ambulation,  [] Eliquis, [] Xarelto  [] Coumadin   Code Status Full Code   Disposition From: Home  Expected Disposition: Home  Estimated Date of Discharge: 2-3 days  Patient requires continued admission due roberta hussein   Surrogate Decision Maker/ POA  Children     Personally reviewed Lab Studies and Imaging       Subjective:     Chief Complaint: Progressively worsening SOB and cough    Patient continues to require supplemental oxygen especially breathing.   She is to have her home BiPAP

## 2023-05-23 NOTE — PROGRESS NOTES
Pulmonary and Critical Care  Progress Note    Subjective: The patient is better. On 3 L N/C. Home Bipap eval not done. Shortness of breath better. Chest pain none  Addressing respiratory complaints Patient is negative for  hemoptysis and cyanosis  CONSTITUTIONAL:  negative for fevers and chills      Past Medical History:     has a past medical history of COPD (chronic obstructive pulmonary disease) ([address] Hx of Doppler ultrasound Carotid Duplex Study, and Hx of echocardiogram.   has no past surgical history on file. reports that she has been smoking cigarettes. She started smoking about 47 years ago. She has a 45.00 pack-year smoking history. She has never used smokeless tobacco. She reports current drug use. Drug: Marijuana Bismark Saris). She reports that she does not drink alcohol. Family history:  family history is not on file. Allergies   Allergen Reactions    Darvocet [Propoxyphene N-Acetaminophen] Hives    Propoxyphene Hives     Social History:    Reviewed; no changes    Objective:   PHYSICAL EXAM:        VITALS:  /67   Pulse 76   Temp 97.8 °F (36.6 °C) (Oral)   Resp 28   Ht 5' 7\" (1.702 m)   Wt 112 lb 2 oz (50.9 kg)   SpO2 97%   BMI 17.56 kg/m²     24HR INTAKE/OUTPUT:    Intake/Output Summary (Last 24 hours) at 5/23/2023 1056  Last data filed at 5/23/2023 0850  Gross per 24 hour   Intake 730 ml   Output 600 ml   Net 130 ml       CONSTITUTIONAL:  awake and responsive. LUNGS:  decreased breath sounds. CARDIOVASCULAR:  normal S1 and S2 and negative JVD  ABD:Abdomen soft, non-tender. BS normal. No masses,  No organomegaly  NEURO:Alert. DATA:    CBC:  No results for input(s): WBC, RBC, HGB, HCT, PLT, MCV, MCH, MCHC, RDW, NRBC, SEGSPCT, BANDSPCT in the last 72 hours. BMP:  No results for input(s): NA, K, CL, CO2, BUN, CREATININE, CALCIUM, GLUCOSE in the last 72 hours.      ABG:  Recent Labs     05/20/23  1430   PH 7.48*   PO2ART 77   MZA2KDF 64.0*   O2SAT 93.9*     Lab Results   Component Value

## 2023-05-23 NOTE — PLAN OF CARE
Problem: Discharge Planning  Goal: Discharge to home or other facility with appropriate resources  Outcome: Progressing  Flowsheets (Taken 5/22/2023 2000)  Discharge to home or other facility with appropriate resources: Identify barriers to discharge with patient and caregiver     Problem: Pain  Goal: Verbalizes/displays adequate comfort level or baseline comfort level  Outcome: Progressing     Problem: Skin/Tissue Integrity  Goal: Absence of new skin breakdown  Description: 1. Monitor for areas of redness and/or skin breakdown  2. Assess vascular access sites hourly  3. Every 4-6 hours minimum:  Change oxygen saturation probe site  4. Every 4-6 hours:  If on nasal continuous positive airway pressure, respiratory therapy assess nares and determine need for appliance change or resting period.   Outcome: Progressing     Problem: Safety - Adult  Goal: Free from fall injury  Outcome: Progressing     Problem: Nutrition Deficit:  Goal: Optimize nutritional status  Outcome: Progressing     Problem: ABCDS Injury Assessment  Goal: Absence of physical injury  Outcome: Progressing

## 2023-05-24 VITALS
WEIGHT: 102.29 LBS | BODY MASS INDEX: 16.06 KG/M2 | HEART RATE: 99 BPM | RESPIRATION RATE: 25 BRPM | OXYGEN SATURATION: 90 % | TEMPERATURE: 98.1 F | SYSTOLIC BLOOD PRESSURE: 114 MMHG | DIASTOLIC BLOOD PRESSURE: 79 MMHG | HEIGHT: 67 IN

## 2023-05-24 PROCEDURE — 6370000000 HC RX 637 (ALT 250 FOR IP): Performed by: SPECIALIST

## 2023-05-24 PROCEDURE — 6360000002 HC RX W HCPCS: Performed by: SPECIALIST

## 2023-05-24 PROCEDURE — 94640 AIRWAY INHALATION TREATMENT: CPT

## 2023-05-24 PROCEDURE — 94761 N-INVAS EAR/PLS OXIMETRY MLT: CPT

## 2023-05-24 PROCEDURE — 6370000000 HC RX 637 (ALT 250 FOR IP): Performed by: INTERNAL MEDICINE

## 2023-05-24 PROCEDURE — 2700000000 HC OXYGEN THERAPY PER DAY

## 2023-05-24 PROCEDURE — 2580000003 HC RX 258: Performed by: NURSE PRACTITIONER

## 2023-05-24 RX ADMIN — IPRATROPIUM BROMIDE AND ALBUTEROL SULFATE 1 AMPULE: .5; 3 SOLUTION RESPIRATORY (INHALATION) at 11:18

## 2023-05-24 RX ADMIN — IPRATROPIUM BROMIDE AND ALBUTEROL SULFATE 1 AMPULE: .5; 3 SOLUTION RESPIRATORY (INHALATION) at 07:11

## 2023-05-24 RX ADMIN — PREDNISONE 40 MG: 20 TABLET ORAL at 08:31

## 2023-05-24 RX ADMIN — ASPIRIN 81 MG CHEWABLE TABLET 81 MG: 81 TABLET CHEWABLE at 08:31

## 2023-05-24 RX ADMIN — ENOXAPARIN SODIUM 30 MG: 100 INJECTION SUBCUTANEOUS at 08:31

## 2023-05-24 RX ADMIN — FAMOTIDINE 20 MG: 20 TABLET ORAL at 08:31

## 2023-05-24 RX ADMIN — SODIUM CHLORIDE, PRESERVATIVE FREE 10 ML: 5 INJECTION INTRAVENOUS at 08:31

## 2023-05-24 NOTE — PROGRESS NOTES
Doctor Please copy and paste below in your progress note per DME requirment. Patient was seen in hospital for Chronic Respiratory Failure due to Severe COPD. I am prescribing BiPAP because the diagnosis and testing requires the patient to have BiPAP in the home. Conditions will improve or be benefited by the use of BiPAP. CPAP will not effectively treat this patient's hypercapnia. I have determined through testing and evaluation that SANJU is not the primary cause of this patients hypercapnia.

## 2023-05-24 NOTE — PROGRESS NOTES
Patient Room #: 8305/3125-M  Patient Name: Kamlesh Jackman NIV Evaluation / Orders  1. Notify Pulmonary Diagnostics of order to evaluate for home NIV. 2. Perform the following tests at any point before discharge. [x] Perform an Overnight Oximetry while the patient is on at least                  2 lpm of oxygen. The saturation level must be <  88% for > 5                   cumulative minutes to qualify. [x] Arterial puncture (ABG) for blood gas analysis done while awake. Qualifying result is a PaCO2 >   52 mmHg    3. [x]  I have documented in a progress note that SANJU is not the primary cause of hypercapnia in this patient. CPAP will not effectively treat this patient hypercapnia. BIPA Therapy will benefit and more effectively treat the hypercapnia. Results Documentation    (Attach a copy of Reports)  1. ABG Results       Date __05/24/2023__  PaCO2 ___64_ mmHg on __3  _ lpm oxygen    2. Oximetry Level _,88_% for _08:08 minutes on _2__ lpm oxygen    3. [x] Patient Qualifies      [] Patient Does NOT qualify      Complete order below:  Diagnosis _Severe COPD, CHF, Emphysema________           Length of Need: [x] Lifetime  Home NIV () at:    Inspiratory Setting _12__ cm H2O         Expiratory Setting ___5___ cm H2O    Therapist Signature: ___Patricia Portillo RRT _____Date: __05/24/2023__    Physician Signature:   Electronically Signed in EMR_______  Date: _____________    Physician Printed Name: ___Colt Laird MD  NPI:  [id number]____________

## 2023-05-24 NOTE — PROGRESS NOTES
All paperwork for Home O2 has been signed and faxed to [facility]. I-GO was delivered to patient as well.

## 2023-05-24 NOTE — PROGRESS NOTES
Reported to respiratory Dar Brooke that patient needs a home 02 evaluation tonight and she states ok.

## 2023-05-24 NOTE — DISCHARGE INSTRUCTIONS
Please see your pulmonologist and use new BiPAP machine every night. If you experience similar symptoms please return to the ER.

## 2023-05-24 NOTE — PROGRESS NOTES
Patient was seen in hospital for Chronic Respiratory Failure due to Severe COPD. I am prescribing BiPAP because the diagnosis and testing requires the patient to have BiPAP in the home. Conditions will improve or be benefited by the use of BiPAP. CPAP will not effectively treat this patient's hypercapnia. I have determined through testing and evaluation that SANJU is not the primary cause of this patients hypercapnia.

## 2023-05-24 NOTE — DISCHARGE SUMMARY
V2.0  Discharge Summary    Name:  Theone Rubinstein /Age/Sex: 1965 (59 y.o. female)   Admit Date: 5/15/2023  Discharge Date: 23    MRN & CSN:  [id number] & [id number] Encounter Date and Time 23 1:58 PM EDT    Attending:  Tim Collado, * Discharging Provider: Tim Collado MD       Hospital Course:     Brief HPI: Theone Rubinstein is a 59 y.o. female   well-established severe/end-stage COPD O2 reliant in home setting (continues to smoke) who has noted progressive decline of clinical respiratory status over the past 24 hours. She admits to progressive cough, sputum production, questionable fever in the home setting. Given severity of her declining clinical status she sought emergency room evaluation. She required use of noninvasive ventilatory support initially was somnolent but reportedly significantly improved in this regard following use of BiPAP as well as aggressive medical measures for treatment of COPD. Brief Problem Based Course:     #Acute on chronic respiratory failure with hypoxia and hypercapnia  #Advanced, end-stage COPD  Patient requires BiPAP overnight and this improves her mentation. Patient had pulmonary consulted me to resume patient's patient with steroids and breathing treatments. Pulm consulted the patient was evaluated by me in house. She qualifies for BiPAP and this may raise by pulmonology. Will be continued on her outpatient treatments with the BiPAP      #Tobacco use disorder  Reports she was down to 1 cigarette a day, last use was 4 days prior to admission. Recommend cessat     #HLD  Continue statin     #CAD  Continue ASA and statin      The patient expressed appropriate understanding of, and agreement with the discharge recommendations, medications, and plan.      Consults this admission:  IP CONSULT TO CRITICAL CARE  IP CONSULT TO PULMONOLOGY    Discharge Diagnosis:     Acute respiratory failure with hypoxia [facility])      Discharge Instruction:

## 2023-05-24 NOTE — PROGRESS NOTES
Pulmonary and Critical Care  Progress Note    Subjective: The patient qualified for home Bipap. Papers signed. Shortness of breath better. Chest pain none  Addressing respiratory complaints Patient is negative for  hemoptysis and cyanosis  CONSTITUTIONAL:  negative for fevers and chills      Past Medical History:     has a past medical history of COPD (chronic obstructive pulmonary disease) ([address] Hx of Doppler ultrasound Carotid Duplex Study, and Hx of echocardiogram.   has no past surgical history on file. reports that she has been smoking cigarettes. She started smoking about 47 years ago. She has a 45.00 pack-year smoking history. She has never used smokeless tobacco. She reports current drug use. Drug: Marijuana [place]ism[place] Saris). She reports that she does not drink alcohol. Family history:  family history is not on file. Allergies   Allergen Reactions    Darvocet [Propoxyphene N-Acetaminophen] Hives    Propoxyphene Hives     Social History:    Reviewed; no changes    Objective:   PHYSICAL EXAM:        VITALS:  /65   Pulse 88   Temp 97.8 °F (36.6 °C) (Oral)   Resp 15   Ht 5' 7\" (1.702 m)   Wt 102 lb 4.7 oz (46.4 kg)   SpO2 91%   BMI 16.02 kg/m²     24HR INTAKE/OUTPUT:    Intake/Output Summary (Last 24 hours) at 5/24/2023 1056  Last data filed at 5/23/2023 2335  Gross per 24 hour   Intake 490 ml   Output --   Net 490 ml       CONSTITUTIONAL:  awake and responsive. LUNGS:  decreased breath sounds. CARDIOVASCULAR:  normal S1 and S2 and negative JVD  ABD:Abdomen soft, non-tender. BS normal. No masses,  No organomegaly  NEURO:Alert. DATA:    CBC:  No results for input(s): WBC, RBC, HGB, HCT, PLT, MCV, MCH, MCHC, RDW, NRBC, SEGSPCT, BANDSPCT in the last 72 hours. BMP:  No results for input(s): NA, K, CL, CO2, BUN, CREATININE, CALCIUM, GLUCOSE in the last 72 hours. ABG:  No results for input(s): PH, PO2ART, UOZ4EHA, HCO3, BEART, O2SAT in the last 72 hours.     Lab Results   Component Value Date

## 2023-05-25 LAB
CULTURE: NORMAL
CULTURE: NORMAL
Lab: NORMAL
Lab: NORMAL
SPECIMEN: NORMAL
SPECIMEN: NORMAL

## 2023-06-26 ENCOUNTER — OFFICE VISIT (OUTPATIENT)
Dept: PULMONOLOGY | Age: 58
End: 2023-06-26
Payer: COMMERCIAL

## 2023-06-26 VITALS
HEIGHT: 67 IN | HEART RATE: 85 BPM | RESPIRATION RATE: 16 BRPM | WEIGHT: 117 LBS | BODY MASS INDEX: 18.36 KG/M2 | OXYGEN SATURATION: 92 %

## 2023-06-26 DIAGNOSIS — G47.34 SLEEP RELATED HYPOXIA: ICD-10-CM

## 2023-06-26 DIAGNOSIS — R06.02 SOBOE (SHORTNESS OF BREATH ON EXERTION): ICD-10-CM

## 2023-06-26 DIAGNOSIS — F17.210 CIGARETTE SMOKER: ICD-10-CM

## 2023-06-26 DIAGNOSIS — J44.9 CHRONIC OBSTRUCTIVE PULMONARY DISEASE, UNSPECIFIED COPD TYPE (HCC): ICD-10-CM

## 2023-06-26 DIAGNOSIS — R09.02 HYPOXIA: ICD-10-CM

## 2023-06-26 DIAGNOSIS — R91.1 RIGHT LOWER LOBE PULMONARY NODULE: ICD-10-CM

## 2023-06-26 PROCEDURE — 99214 OFFICE O/P EST MOD 30 MIN: CPT | Performed by: INTERNAL MEDICINE

## 2023-06-26 PROCEDURE — G8419 CALC BMI OUT NRM PARAM NOF/U: HCPCS | Performed by: INTERNAL MEDICINE

## 2023-06-26 PROCEDURE — 1036F TOBACCO NON-USER: CPT | Performed by: INTERNAL MEDICINE

## 2023-06-26 PROCEDURE — G8427 DOCREV CUR MEDS BY ELIG CLIN: HCPCS | Performed by: INTERNAL MEDICINE

## 2023-06-26 PROCEDURE — 3017F COLORECTAL CA SCREEN DOC REV: CPT | Performed by: INTERNAL MEDICINE

## 2023-06-26 PROCEDURE — 3023F SPIROM DOC REV: CPT | Performed by: INTERNAL MEDICINE

## 2023-06-26 RX ORDER — FLUTICASONE FUROATE, UMECLIDINIUM BROMIDE AND VILANTEROL TRIFENATATE 100; 62.5; 25 UG/1; UG/1; UG/1
1 POWDER RESPIRATORY (INHALATION) DAILY
Qty: 1 EACH | Refills: 4 | Status: SHIPPED | OUTPATIENT
Start: 2023-06-26

## 2023-06-26 ASSESSMENT — ENCOUNTER SYMPTOMS
BACK PAIN: 0
EYE DISCHARGE: 0
COUGH: 0
EYE ITCHING: 0
ABDOMINAL DISTENTION: 0
SHORTNESS OF BREATH: 0
ABDOMINAL PAIN: 0

## 2023-08-28 ENCOUNTER — OFFICE VISIT (OUTPATIENT)
Dept: CARDIOLOGY CLINIC | Age: 58
End: 2023-08-28
Payer: COMMERCIAL

## 2023-08-28 VITALS
WEIGHT: 114.6 LBS | HEIGHT: 67 IN | SYSTOLIC BLOOD PRESSURE: 118 MMHG | HEART RATE: 80 BPM | DIASTOLIC BLOOD PRESSURE: 72 MMHG | BODY MASS INDEX: 17.99 KG/M2

## 2023-08-28 DIAGNOSIS — I50.32 CHRONIC HEART FAILURE WITH PRESERVED EJECTION FRACTION (HCC): Primary | ICD-10-CM

## 2023-08-28 PROCEDURE — 3017F COLORECTAL CA SCREEN DOC REV: CPT | Performed by: INTERNAL MEDICINE

## 2023-08-28 PROCEDURE — G8419 CALC BMI OUT NRM PARAM NOF/U: HCPCS | Performed by: INTERNAL MEDICINE

## 2023-08-28 PROCEDURE — G8427 DOCREV CUR MEDS BY ELIG CLIN: HCPCS | Performed by: INTERNAL MEDICINE

## 2023-08-28 PROCEDURE — 99214 OFFICE O/P EST MOD 30 MIN: CPT | Performed by: INTERNAL MEDICINE

## 2023-08-28 PROCEDURE — 1036F TOBACCO NON-USER: CPT | Performed by: INTERNAL MEDICINE

## 2023-08-28 NOTE — PROGRESS NOTES
CARDIOLOGY NOTE      8/28/2023    RE: Phillip Rees  (1965)                               TO:  Dr. Morgan Garland MD            Lashell Orozco is a 59 y.o. female who was seen today for management of HFpEF         Follow-up                          HPI:                   Pt has h/o shortness of breath, dilated right-sided chambers, COPD, HFpEF seen today for follow-up. Phillip Rees has the following history recorded in care path:  Patient Active Problem List    Diagnosis Date Noted    Chronic heart failure with preserved ejection fraction ([address]) 02/07/2023    Right lower lobe pulmonary nodule 02/06/2023    Hypoxia 02/06/2023    COPD exacerbation ([address]) 12/09/2022    COPD (chronic obstructive pulmonary disease) ([address]) 10/19/2022    Cigarette smoker 10/19/2022    SOBOE (shortness of breath on exertion) 10/19/2022    Right upper lobe pulmonary nodule 10/19/2022    Sleep related hypoxia 10/19/2022    Chronic right-sided heart failure ([address]) 07/29/2022    Moderate malnutrition ([address]) 05/16/2023    Acute respiratory failure with hypoxia (HCC) 05/15/2023    Severe malnutrition ([address]) 12/30/2021    Pneumonia 12/29/2021    Occlusion of right carotid artery 05/15/2018    Centrilobular emphysema ([address]) 10/05/2017     Current Outpatient Medications   Medication Sig Dispense Refill    OXYGEN Inhale into the lungs      fluticasone-umeclidin-vilant (TRELEGY ELLIPTA) 100-62.5-25 MCG/ACT AEPB inhaler Inhale 1 puff into the lungs daily 1 each 4    diazePAM (VALIUM) 5 MG tablet Take 1 tablet by mouth daily as needed. 05/15/23 patient reports she does not take this routinely.       atorvastatin (LIPITOR) 40 MG tablet Take 1 tablet by mouth daily 30 tablet 5    ezetimibe (ZETIA) 10 MG tablet Take 1 tablet by mouth daily 90 tablet 1    albuterol sulfate HFA (PROVENTIL;VENTOLIN;PROAIR) 108 (90 Base) MCG/ACT inhaler Inhale 2 puffs into the lungs every 4 hours as needed for Wheezing or Shortness of Breath 18 g 5

## 2023-08-28 NOTE — PATIENT INSTRUCTIONS
We are committed to providing you the best care possible. If you receive a survey after visiting one of our offices, please take time to share your experience concerning your physician office visit. These surveys are confidential and no health information about you is shared. We are eager to improve for you and we are counting on your feedback to help make that happen. **It is YOUR responsibilty to bring medication bottles and/or updated medication list to [facility]. This will allow us to better serve you and all your healthcare needs**    Thank you for allowing us to care for you today! We want to ensure we can follow your treatment plan and we strive to give you the best outcomes and experience possible. If you ever have a life threatening emergency and call 911 - for an ambulance (EMS)   Our providers can only care for you at:   [facility] or [facility]. Even if you have someone take you or you drive yourself we can only care for you in a [facility] facility. Our providers are not setup at the other healthcare locations!

## 2023-08-29 ENCOUNTER — TELEPHONE (OUTPATIENT)
Dept: PULMONOLOGY | Age: 58
End: 2023-08-29

## 2023-09-01 ENCOUNTER — HOSPITAL ENCOUNTER (OUTPATIENT)
Dept: WOMENS IMAGING | Age: 58
Discharge: HOME | End: 2023-09-01
Attending: FAMILY MEDICINE
Payer: COMMERCIAL

## 2023-09-01 ENCOUNTER — HOSPITAL ENCOUNTER (OUTPATIENT)
Dept: CT IMAGING | Age: 58
Discharge: HOME | End: 2023-09-01
Attending: INTERNAL MEDICINE
Payer: COMMERCIAL

## 2023-09-01 DIAGNOSIS — Z12.31 ENCOUNTER FOR SCREENING MAMMOGRAM FOR MALIGNANT NEOPLASM OF BREAST: ICD-10-CM

## 2023-09-01 DIAGNOSIS — R91.1 RIGHT LOWER LOBE PULMONARY NODULE: ICD-10-CM

## 2023-09-01 PROCEDURE — 71250 CT THORAX DX C-: CPT

## 2023-09-01 PROCEDURE — 77063 BREAST TOMOSYNTHESIS BI: CPT

## 2024-02-20 ENCOUNTER — TELEPHONE (OUTPATIENT)
Dept: PULMONOLOGY | Age: 59
End: 2024-02-20

## 2024-02-20 NOTE — TELEPHONE ENCOUNTER
Notified by central scheduling that they have been unable to reach this patient to schedule the 6MWT/PFT. Called patient and lvm explaining how important it is for her to get this testing done. Provided the number for central scheduling for patient to follow up.

## 2024-03-05 ENCOUNTER — OFFICE VISIT (OUTPATIENT)
Dept: CARDIOLOGY CLINIC | Age: 59
End: 2024-03-05
Payer: COMMERCIAL

## 2024-03-05 VITALS
OXYGEN SATURATION: 86 % | RESPIRATION RATE: 20 BRPM | DIASTOLIC BLOOD PRESSURE: 60 MMHG | HEART RATE: 89 BPM | SYSTOLIC BLOOD PRESSURE: 116 MMHG | BODY MASS INDEX: 35.56 KG/M2 | HEIGHT: 67 IN | WEIGHT: 226.6 LBS

## 2024-03-05 DIAGNOSIS — I65.21 OCCLUSION OF RIGHT CAROTID ARTERY: ICD-10-CM

## 2024-03-05 DIAGNOSIS — I50.32 CHRONIC HEART FAILURE WITH PRESERVED EJECTION FRACTION (HCC): Primary | ICD-10-CM

## 2024-03-05 PROCEDURE — G8417 CALC BMI ABV UP PARAM F/U: HCPCS | Performed by: NURSE PRACTITIONER

## 2024-03-05 PROCEDURE — 3017F COLORECTAL CA SCREEN DOC REV: CPT | Performed by: NURSE PRACTITIONER

## 2024-03-05 PROCEDURE — 1036F TOBACCO NON-USER: CPT | Performed by: NURSE PRACTITIONER

## 2024-03-05 PROCEDURE — G8484 FLU IMMUNIZE NO ADMIN: HCPCS | Performed by: NURSE PRACTITIONER

## 2024-03-05 PROCEDURE — G8427 DOCREV CUR MEDS BY ELIG CLIN: HCPCS | Performed by: NURSE PRACTITIONER

## 2024-03-05 PROCEDURE — 99214 OFFICE O/P EST MOD 30 MIN: CPT | Performed by: NURSE PRACTITIONER

## 2024-03-05 PROCEDURE — 93000 ELECTROCARDIOGRAM COMPLETE: CPT | Performed by: NURSE PRACTITIONER

## 2024-03-05 ASSESSMENT — ENCOUNTER SYMPTOMS
ORTHOPNEA: 0
SHORTNESS OF BREATH: 1

## 2024-03-05 NOTE — PATIENT INSTRUCTIONS
No outreach public draw sites as of 2/13/2024.  Please be informed that if you contact our office outside of normal business hours the physician on call cannot help with any scheduling or rescheduling issues, procedure instruction questions or any type of medication issue.    We advise you for any urgent/emergency that you go to the nearest emergency room!    PLEASE CALL OUR OFFICE DURING NORMAL BUSINESS HOURS    Monday - Friday   8 am to 5 pm    [place]: [phone]    [place]: [phone]    [place]:  [phone]  Thank you for allowing us to care for you today!   We want to ensure we can follow your treatment plan and we strive to give you the best outcomes and experience possible.   If you ever have a life threatening emergency and call 911 - for an ambulance (EMS)   Our providers can only care for you at:   [facility] or [facility].   Even if you have someone take you or you drive yourself we can only care for you in a [facility] facility. Our providers are not setup at the other healthcare locations!   We are committed to providing you the best care possible.    If you receive a survey after visiting one of our offices, please take time to share your experience concerning your physician office visit.  These surveys are confidential and no health information about you is shared.    We are eager to improve for you and we are counting on your feedback to help make that happen.

## 2024-03-05 NOTE — PROGRESS NOTES
3/5/2024  Primary cardiologist: Dr. Parker    CC:   Eloina  is an established 58 y.o.  female here for a 6 month follow up on HFpEF      SUBJECTIVE/OBJECTIVE:  Eloina is a 58 y.o. female with a history of HFpEF, dilated right sides chambers, COPD, carotid artery stenosis ( occlusion of the right ICA)      HPI :   Eloina reports she has stopped smoking- congtrations to patient.  Her shortness of breath is at baseline.  She is using supplemental oxygen around-the-clock.  She denies TIA strokelike symptoms.  Reports compliance with medications.    Review of Systems   Constitutional: Negative for diaphoresis and malaise/fatigue.   Cardiovascular:  Negative for chest pain, claudication, dyspnea on exertion, irregular heartbeat, leg swelling, near-syncope, orthopnea, palpitations and paroxysmal nocturnal dyspnea.   Respiratory:  Positive for shortness of breath.    Neurological:  Negative for dizziness and light-headedness.       Vitals:    03/05/24 1545   BP: 116/60   Site: Left Upper Arm   Position: Sitting   Cuff Size: Medium Adult   Pulse: 89   Resp: 20   SpO2: (!) 86%   Weight: 102.8 kg (226 lb 9.6 oz)   Height: 1.702 m (5' 7\")     Wt Readings from Last 3 Encounters:   03/05/24 102.8 kg (226 lb 9.6 oz)   08/28/23 52 kg (114 lb 9.6 oz)   06/26/23 53.1 kg (117 lb)      Body mass index is 35.49 kg/m².     Physical Exam  Vitals reviewed.   Eyes:      Pupils: Pupils are equal, round, and reactive to light.   Neck:      Vascular: No carotid bruit.   Cardiovascular:      Rate and Rhythm: Normal rate and regular rhythm.      Pulses: Normal pulses.   Pulmonary:      Effort: Pulmonary effort is normal.      Breath sounds: Examination of the right-lower field reveals decreased breath sounds. Examination of the left-lower field reveals decreased breath sounds. Decreased breath sounds present. No rales.   Chest:      Chest wall: No tenderness.   Musculoskeletal:      Cervical back: No tenderness.      Right lower leg: No

## 2024-05-13 ENCOUNTER — HOSPITAL ENCOUNTER (OUTPATIENT)
Age: 59
Setting detail: OBSERVATION
Discharge: HOME | End: 2024-05-14
Attending: EMERGENCY MEDICINE | Admitting: STUDENT IN AN ORGANIZED HEALTH CARE EDUCATION/TRAINING PROGRAM
Payer: COMMERCIAL

## 2024-05-13 ENCOUNTER — APPOINTMENT (OUTPATIENT)
Dept: GENERAL RADIOLOGY | Age: 59
End: 2024-05-13
Attending: EMERGENCY MEDICINE
Payer: COMMERCIAL

## 2024-05-13 DIAGNOSIS — J44.1 COPD EXACERBATION (HCC): Primary | ICD-10-CM

## 2024-05-13 DIAGNOSIS — R09.02 HYPOXIA: ICD-10-CM

## 2024-05-13 DIAGNOSIS — J43.2 CENTRILOBULAR EMPHYSEMA (HCC): ICD-10-CM

## 2024-05-13 LAB
ALBUMIN SERPL-MCNC: 4.4 GM/DL (ref 3.4–5)
ALP BLD-CCNC: 102 IU/L (ref 40–129)
ALT SERPL-CCNC: 13 U/L (ref 10–40)
ANION GAP SERPL CALCULATED.3IONS-SCNC: 9 MMOL/L (ref 7–16)
AST SERPL-CCNC: 21 IU/L (ref 15–37)
BASE EXCESS MIXED: 10.2 (ref 0–3)
BASOPHILS ABSOLUTE: 0.1 K/CU MM
BASOPHILS RELATIVE PERCENT: 0.6 % (ref 0–1)
BILIRUB SERPL-MCNC: 0.5 MG/DL (ref 0–1)
BUN SERPL-MCNC: 13 MG/DL (ref 6–23)
CALCIUM SERPL-MCNC: 9.3 MG/DL (ref 8.3–10.6)
CHLORIDE BLD-SCNC: 101 MMOL/L (ref 99–110)
CO2: 32 MMOL/L (ref 21–32)
COMMENT: ABNORMAL
CREAT SERPL-MCNC: 0.7 MG/DL (ref 0.6–1.1)
D DIMER: 0.44 UG/ML (FEU)
DIFFERENTIAL TYPE: ABNORMAL
EOSINOPHILS ABSOLUTE: 0 K/CU MM
EOSINOPHILS RELATIVE PERCENT: 0.5 % (ref 0–3)
GFR, ESTIMATED: >90 ML/MIN/1.73M2
GLUCOSE SERPL-MCNC: 112 MG/DL (ref 70–99)
HCO3 VENOUS: 37.9 MMOL/L (ref 22–29)
HCT VFR BLD CALC: 46 % (ref 37–47)
HEMOGLOBIN: 14.6 GM/DL (ref 12.5–16)
IMMATURE NEUTROPHIL %: 0.1 % (ref 0–0.43)
INFLUENZA A ANTIGEN: NOT DETECTED
INFLUENZA B ANTIGEN: NOT DETECTED
LACTIC ACID, SEPSIS: 0.8 MMOL/L (ref 0.4–2)
LACTIC ACID, SEPSIS: 1.2 MMOL/L (ref 0.4–2)
LYMPHOCYTES ABSOLUTE: 1.5 K/CU MM
LYMPHOCYTES RELATIVE PERCENT: 17.4 % (ref 24–44)
MAGNESIUM: 2.1 MG/DL (ref 1.8–2.4)
MCH RBC QN AUTO: 30 PG (ref 27–31)
MCHC RBC AUTO-ENTMCNC: 31.7 % (ref 32–36)
MCV RBC AUTO: 94.7 FL (ref 78–100)
MONOCYTES ABSOLUTE: 0.7 K/CU MM
MONOCYTES RELATIVE PERCENT: 7.7 % (ref 0–4)
NEUTROPHILS ABSOLUTE: 6.2 K/CU MM
NEUTROPHILS RELATIVE PERCENT: 73.7 % (ref 36–66)
NUCLEATED RBC %: 0 %
O2 SAT, VEN: 91 % (ref 50–70)
PCO2, VEN: 64 MMHG (ref 41–51)
PDW BLD-RTO: 11.8 % (ref 11.7–14.9)
PH VENOUS: 7.38 (ref 7.32–7.43)
PLATELET # BLD: 230 K/CU MM (ref 140–440)
PMV BLD AUTO: 11.3 FL (ref 7.5–11.1)
PO2, VEN: 136 MMHG (ref 28–48)
POTASSIUM SERPL-SCNC: 4 MMOL/L (ref 3.5–5.1)
PRO-BNP: 95.02 PG/ML
RBC # BLD: 4.86 M/CU MM (ref 4.2–5.4)
SARS-COV-2 RDRP RESP QL NAA+PROBE: NOT DETECTED
SODIUM BLD-SCNC: 142 MMOL/L (ref 135–145)
SOURCE: NORMAL
TOTAL IMMATURE NEUTOROPHIL: 0.01 K/CU MM
TOTAL NUCLEATED RBC: 0 K/CU MM
TOTAL PROTEIN: 6.9 GM/DL (ref 6.4–8.2)
TROPONIN, HIGH SENSITIVITY: 20 NG/L (ref 0–14)
TROPONIN, HIGH SENSITIVITY: 22 NG/L (ref 0–14)
WBC # BLD: 8.4 K/CU MM (ref 4–10.5)

## 2024-05-13 PROCEDURE — 2580000003 HC RX 258: Performed by: EMERGENCY MEDICINE

## 2024-05-13 PROCEDURE — 83605 ASSAY OF LACTIC ACID: CPT

## 2024-05-13 PROCEDURE — 85025 COMPLETE CBC W/AUTO DIFF WBC: CPT

## 2024-05-13 PROCEDURE — 96374 THER/PROPH/DIAG INJ IV PUSH: CPT

## 2024-05-13 PROCEDURE — G0378 HOSPITAL OBSERVATION PER HR: HCPCS

## 2024-05-13 PROCEDURE — 6360000002 HC RX W HCPCS: Performed by: EMERGENCY MEDICINE

## 2024-05-13 PROCEDURE — 71045 X-RAY EXAM CHEST 1 VIEW: CPT

## 2024-05-13 PROCEDURE — 2580000003 HC RX 258: Performed by: STUDENT IN AN ORGANIZED HEALTH CARE EDUCATION/TRAINING PROGRAM

## 2024-05-13 PROCEDURE — 80053 COMPREHEN METABOLIC PANEL: CPT

## 2024-05-13 PROCEDURE — 99285 EMERGENCY DEPT VISIT HI MDM: CPT

## 2024-05-13 PROCEDURE — 94640 AIRWAY INHALATION TREATMENT: CPT

## 2024-05-13 PROCEDURE — 93005 ELECTROCARDIOGRAM TRACING: CPT | Performed by: EMERGENCY MEDICINE

## 2024-05-13 PROCEDURE — 83735 ASSAY OF MAGNESIUM: CPT

## 2024-05-13 PROCEDURE — 85379 FIBRIN DEGRADATION QUANT: CPT

## 2024-05-13 PROCEDURE — 6370000000 HC RX 637 (ALT 250 FOR IP): Performed by: STUDENT IN AN ORGANIZED HEALTH CARE EDUCATION/TRAINING PROGRAM

## 2024-05-13 PROCEDURE — 96365 THER/PROPH/DIAG IV INF INIT: CPT

## 2024-05-13 PROCEDURE — 96375 TX/PRO/DX INJ NEW DRUG ADDON: CPT

## 2024-05-13 PROCEDURE — 87040 BLOOD CULTURE FOR BACTERIA: CPT

## 2024-05-13 PROCEDURE — 83880 ASSAY OF NATRIURETIC PEPTIDE: CPT

## 2024-05-13 PROCEDURE — 84145 PROCALCITONIN (PCT): CPT

## 2024-05-13 PROCEDURE — 84484 ASSAY OF TROPONIN QUANT: CPT

## 2024-05-13 PROCEDURE — 87502 INFLUENZA DNA AMP PROBE: CPT

## 2024-05-13 PROCEDURE — 87635 SARS-COV-2 COVID-19 AMP PRB: CPT

## 2024-05-13 PROCEDURE — 81003 URINALYSIS AUTO W/O SCOPE: CPT

## 2024-05-13 PROCEDURE — 82805 BLOOD GASES W/O2 SATURATION: CPT

## 2024-05-13 PROCEDURE — 6370000000 HC RX 637 (ALT 250 FOR IP): Performed by: EMERGENCY MEDICINE

## 2024-05-13 RX ORDER — ONDANSETRON 2 MG/ML
4 INJECTION INTRAMUSCULAR; INTRAVENOUS EVERY 6 HOURS PRN
Status: DISCONTINUED | OUTPATIENT
Start: 2024-05-13 | End: 2024-05-14 | Stop reason: HOSPADM

## 2024-05-13 RX ORDER — SODIUM CHLORIDE 0.9 % (FLUSH) 0.9 %
5-40 SYRINGE (ML) INJECTION EVERY 12 HOURS SCHEDULED
Status: DISCONTINUED | OUTPATIENT
Start: 2024-05-13 | End: 2024-05-14 | Stop reason: HOSPADM

## 2024-05-13 RX ORDER — LANOLIN ALCOHOL/MO/W.PET/CERES
3 CREAM (GRAM) TOPICAL NIGHTLY PRN
Status: DISCONTINUED | OUTPATIENT
Start: 2024-05-13 | End: 2024-05-14 | Stop reason: HOSPADM

## 2024-05-13 RX ORDER — ASPIRIN 81 MG/1
81 TABLET, CHEWABLE ORAL DAILY
Status: DISCONTINUED | OUTPATIENT
Start: 2024-05-14 | End: 2024-05-14 | Stop reason: HOSPADM

## 2024-05-13 RX ORDER — BENZONATATE 100 MG/1
100 CAPSULE ORAL 3 TIMES DAILY PRN
Status: DISCONTINUED | OUTPATIENT
Start: 2024-05-13 | End: 2024-05-14 | Stop reason: HOSPADM

## 2024-05-13 RX ORDER — ENOXAPARIN SODIUM 100 MG/ML
40 INJECTION SUBCUTANEOUS DAILY
Status: DISCONTINUED | OUTPATIENT
Start: 2024-05-14 | End: 2024-05-14 | Stop reason: HOSPADM

## 2024-05-13 RX ORDER — MAGNESIUM SULFATE IN WATER 40 MG/ML
2000 INJECTION, SOLUTION INTRAVENOUS PRN
Status: DISCONTINUED | OUTPATIENT
Start: 2024-05-13 | End: 2024-05-14 | Stop reason: HOSPADM

## 2024-05-13 RX ORDER — ONDANSETRON 4 MG/1
4 TABLET, ORALLY DISINTEGRATING ORAL EVERY 8 HOURS PRN
Status: DISCONTINUED | OUTPATIENT
Start: 2024-05-13 | End: 2024-05-14 | Stop reason: HOSPADM

## 2024-05-13 RX ORDER — POLYETHYLENE GLYCOL 3350 17 G/17G
17 POWDER, FOR SOLUTION ORAL DAILY PRN
Status: DISCONTINUED | OUTPATIENT
Start: 2024-05-13 | End: 2024-05-14 | Stop reason: HOSPADM

## 2024-05-13 RX ORDER — POTASSIUM CHLORIDE 7.45 MG/ML
10 INJECTION INTRAVENOUS PRN
Status: DISCONTINUED | OUTPATIENT
Start: 2024-05-13 | End: 2024-05-14 | Stop reason: HOSPADM

## 2024-05-13 RX ORDER — EZETIMIBE 10 MG/1
10 TABLET ORAL DAILY
Status: DISCONTINUED | OUTPATIENT
Start: 2024-05-14 | End: 2024-05-14 | Stop reason: HOSPADM

## 2024-05-13 RX ORDER — GUAIFENESIN 600 MG/1
600 TABLET, EXTENDED RELEASE ORAL 2 TIMES DAILY
Status: DISCONTINUED | OUTPATIENT
Start: 2024-05-13 | End: 2024-05-14 | Stop reason: HOSPADM

## 2024-05-13 RX ORDER — IPRATROPIUM BROMIDE AND ALBUTEROL SULFATE 2.5; .5 MG/3ML; MG/3ML
3 SOLUTION RESPIRATORY (INHALATION)
Status: COMPLETED | OUTPATIENT
Start: 2024-05-13 | End: 2024-05-13

## 2024-05-13 RX ORDER — IPRATROPIUM BROMIDE AND ALBUTEROL SULFATE 2.5; .5 MG/3ML; MG/3ML
1 SOLUTION RESPIRATORY (INHALATION)
Status: DISCONTINUED | OUTPATIENT
Start: 2024-05-13 | End: 2024-05-14 | Stop reason: HOSPADM

## 2024-05-13 RX ORDER — SODIUM CHLORIDE 9 MG/ML
INJECTION, SOLUTION INTRAVENOUS PRN
Status: DISCONTINUED | OUTPATIENT
Start: 2024-05-13 | End: 2024-05-14 | Stop reason: HOSPADM

## 2024-05-13 RX ORDER — POTASSIUM CHLORIDE 20 MEQ/1
10 TABLET, EXTENDED RELEASE ORAL DAILY
Status: DISCONTINUED | OUTPATIENT
Start: 2024-05-14 | End: 2024-05-14 | Stop reason: HOSPADM

## 2024-05-13 RX ORDER — SODIUM CHLORIDE 0.9 % (FLUSH) 0.9 %
5-40 SYRINGE (ML) INJECTION PRN
Status: DISCONTINUED | OUTPATIENT
Start: 2024-05-13 | End: 2024-05-14 | Stop reason: HOSPADM

## 2024-05-13 RX ORDER — FUROSEMIDE 20 MG/1
20 TABLET ORAL DAILY
Status: DISCONTINUED | OUTPATIENT
Start: 2024-05-13 | End: 2024-05-14 | Stop reason: HOSPADM

## 2024-05-13 RX ORDER — ATORVASTATIN CALCIUM 40 MG/1
40 TABLET, FILM COATED ORAL NIGHTLY
Status: DISCONTINUED | OUTPATIENT
Start: 2024-05-13 | End: 2024-05-14 | Stop reason: HOSPADM

## 2024-05-13 RX ORDER — BUDESONIDE AND FORMOTEROL FUMARATE DIHYDRATE 160; 4.5 UG/1; UG/1
2 AEROSOL RESPIRATORY (INHALATION) 2 TIMES DAILY
Status: DISCONTINUED | OUTPATIENT
Start: 2024-05-13 | End: 2024-05-14 | Stop reason: HOSPADM

## 2024-05-13 RX ORDER — PREDNISONE 20 MG/1
40 TABLET ORAL DAILY
Status: DISCONTINUED | OUTPATIENT
Start: 2024-05-14 | End: 2024-05-14 | Stop reason: HOSPADM

## 2024-05-13 RX ADMIN — GUAIFENESIN 600 MG: 600 TABLET, EXTENDED RELEASE ORAL at 21:02

## 2024-05-13 RX ADMIN — IPRATROPIUM BROMIDE AND ALBUTEROL SULFATE 1 DOSE: 2.5; .5 SOLUTION RESPIRATORY (INHALATION) at 22:37

## 2024-05-13 RX ADMIN — FUROSEMIDE 20 MG: 20 TABLET ORAL at 21:02

## 2024-05-13 RX ADMIN — IPRATROPIUM BROMIDE AND ALBUTEROL SULFATE 3 DOSE: 2.5; .5 SOLUTION RESPIRATORY (INHALATION) at 19:00

## 2024-05-13 RX ADMIN — WATER 1000 MG: 1 INJECTION INTRAMUSCULAR; INTRAVENOUS; SUBCUTANEOUS at 21:06

## 2024-05-13 RX ADMIN — AZITHROMYCIN MONOHYDRATE 500 MG: 500 INJECTION, POWDER, LYOPHILIZED, FOR SOLUTION INTRAVENOUS at 21:40

## 2024-05-13 RX ADMIN — SODIUM CHLORIDE, PRESERVATIVE FREE 10 ML: 5 INJECTION INTRAVENOUS at 21:38

## 2024-05-13 RX ADMIN — BUDESONIDE AND FORMOTEROL FUMARATE DIHYDRATE 2 PUFF: 160; 4.5 AEROSOL RESPIRATORY (INHALATION) at 20:30

## 2024-05-13 RX ADMIN — ATORVASTATIN CALCIUM 40 MG: 40 TABLET, FILM COATED ORAL at 22:09

## 2024-05-13 RX ADMIN — WATER 125 MG: 1 INJECTION INTRAMUSCULAR; INTRAVENOUS; SUBCUTANEOUS at 19:14

## 2024-05-13 NOTE — ED PROVIDER NOTES
Emergency Department Encounter    Patient: Eloina Roca  MRN: [id number]  : 1965  Date of Evaluation: 2024  ED Provider:  Mile Roberts DO    Triage Chief Complaint:   Shortness of Breath (On 4L of oxygen at home )    Kenaitze:  Eloina Roca is a 58 y.o. female that presents   In brief, presents with shortness of breath.  Hx of COPD.  Coughing up white sputum.  Mild bilateral ankle swelling. Nurse turned O2 up from 4L NC to 6 LNC        ROS - see HPI, below listed is current ROS at time of my eval:   systems reviewed and negative except as above.     Past Medical History:   Diagnosis Date    COPD (chronic obstructive pulmonary disease) (HCC)     Hx of Doppler ultrasound Carotid Duplex Study 2022    The Left Proximal internal carotid artery exhibits 0-49% stenosis. Normal vertebral flow.    Hx of echocardiogram 2022    EF is 55-60% no wall motion abnormalities. Right side of heart is enlarged. No evidence of pericardial effusion     No past surgical history on file.  Family History   Problem Relation Age of Onset    Breast Cancer Neg Hx     Ovarian Cancer Neg Hx      Social History     Socioeconomic History    Marital status: Single     Spouse name: Not on file    Number of children: Not on file    Years of education: Not on file    Highest education level: Not on file   Occupational History    Not on file   Tobacco Use    Smoking status: Former     Current packs/day: 0.00     Average packs/day: 1 pack/day for 47.4 years (47.4 ttl pk-yrs)     Types: Cigarettes     Start date: 1976     Quit date: 5/10/2023     Years since quittin.0    Smokeless tobacco: Never   Substance and Sexual Activity    Alcohol use: No    Drug use: Yes     Types: Marijuana (Weed)     Comment: occ    Sexual activity: Not on file   Other Topics Concern    Not on file   Social History Narrative    Not on file     Social Determinants of Health     Financial Resource Strain: Low Risk  (2022)    Overall

## 2024-05-14 VITALS
HEART RATE: 90 BPM | OXYGEN SATURATION: 94 % | TEMPERATURE: 97.7 F | HEIGHT: 67 IN | BODY MASS INDEX: 19.79 KG/M2 | WEIGHT: 126.1 LBS | DIASTOLIC BLOOD PRESSURE: 63 MMHG | SYSTOLIC BLOOD PRESSURE: 117 MMHG | RESPIRATION RATE: 23 BRPM

## 2024-05-14 LAB
ANION GAP SERPL CALCULATED.3IONS-SCNC: 9 MMOL/L (ref 7–16)
BASOPHILS ABSOLUTE: 0 K/CU MM
BASOPHILS RELATIVE PERCENT: 0 % (ref 0–1)
BILIRUBIN, URINE: NEGATIVE MG/DL
BLOOD, URINE: NEGATIVE
BUN SERPL-MCNC: 13 MG/DL (ref 6–23)
CALCIUM SERPL-MCNC: 9 MG/DL (ref 8.3–10.6)
CHLORIDE BLD-SCNC: 102 MMOL/L (ref 99–110)
CLARITY: CLEAR
CO2: 31 MMOL/L (ref 21–32)
COLOR: YELLOW
COMMENT UA: NORMAL
CREAT SERPL-MCNC: 0.6 MG/DL (ref 0.6–1.1)
DIFFERENTIAL TYPE: ABNORMAL
EKG ATRIAL RATE: 85 BPM
EKG DIAGNOSIS: NORMAL
EKG P AXIS: 87 DEGREES
EKG P-R INTERVAL: 128 MS
EKG Q-T INTERVAL: 360 MS
EKG QRS DURATION: 90 MS
EKG QTC CALCULATION (BAZETT): 428 MS
EKG R AXIS: 86 DEGREES
EKG T AXIS: 72 DEGREES
EKG VENTRICULAR RATE: 85 BPM
EOSINOPHILS ABSOLUTE: 0 K/CU MM
EOSINOPHILS RELATIVE PERCENT: 0 % (ref 0–3)
GFR, ESTIMATED: >90 ML/MIN/1.73M2
GLUCOSE SERPL-MCNC: 139 MG/DL (ref 70–99)
GLUCOSE URINE: NEGATIVE MG/DL
HCT VFR BLD CALC: 43.5 % (ref 37–47)
HEMOGLOBIN: 13.8 GM/DL (ref 12.5–16)
IMMATURE NEUTROPHIL %: 0.2 % (ref 0–0.43)
KETONES, URINE: NEGATIVE MG/DL
LEUKOCYTE ESTERASE, URINE: NEGATIVE
LYMPHOCYTES ABSOLUTE: 0.2 K/CU MM
LYMPHOCYTES RELATIVE PERCENT: 4.2 % (ref 24–44)
MCH RBC QN AUTO: 29.9 PG (ref 27–31)
MCHC RBC AUTO-ENTMCNC: 31.7 % (ref 32–36)
MCV RBC AUTO: 94.2 FL (ref 78–100)
MONOCYTES ABSOLUTE: 0 K/CU MM
MONOCYTES RELATIVE PERCENT: 0.6 % (ref 0–4)
NEUTROPHILS ABSOLUTE: 4.9 K/CU MM
NEUTROPHILS RELATIVE PERCENT: 95 % (ref 36–66)
NITRITE URINE, QUANTITATIVE: NEGATIVE
NUCLEATED RBC %: 0 %
PDW BLD-RTO: 11.9 % (ref 11.7–14.9)
PH, URINE: 6.5 (ref 5–8)
PLATELET # BLD: 199 K/CU MM (ref 140–440)
PMV BLD AUTO: 11.4 FL (ref 7.5–11.1)
POTASSIUM SERPL-SCNC: 4.4 MMOL/L (ref 3.5–5.1)
PROCALCITONIN SERPL-MCNC: 0.02 NG/ML
PROTEIN UA: NEGATIVE MG/DL
RBC # BLD: 4.62 M/CU MM (ref 4.2–5.4)
SODIUM BLD-SCNC: 142 MMOL/L (ref 135–145)
SPECIFIC GRAVITY UA: 1.02 (ref 1–1.03)
TOTAL IMMATURE NEUTOROPHIL: 0.01 K/CU MM
TOTAL NUCLEATED RBC: 0 K/CU MM
UROBILINOGEN, URINE: 0.2 MG/DL (ref 0.2–1)
WBC # BLD: 5.2 K/CU MM (ref 4–10.5)

## 2024-05-14 PROCEDURE — 93010 ELECTROCARDIOGRAM REPORT: CPT | Performed by: INTERNAL MEDICINE

## 2024-05-14 PROCEDURE — 6370000000 HC RX 637 (ALT 250 FOR IP): Performed by: STUDENT IN AN ORGANIZED HEALTH CARE EDUCATION/TRAINING PROGRAM

## 2024-05-14 PROCEDURE — G0378 HOSPITAL OBSERVATION PER HR: HCPCS

## 2024-05-14 PROCEDURE — 36415 COLL VENOUS BLD VENIPUNCTURE: CPT

## 2024-05-14 PROCEDURE — 94761 N-INVAS EAR/PLS OXIMETRY MLT: CPT

## 2024-05-14 PROCEDURE — 94640 AIRWAY INHALATION TREATMENT: CPT

## 2024-05-14 PROCEDURE — 2700000000 HC OXYGEN THERAPY PER DAY

## 2024-05-14 PROCEDURE — 85025 COMPLETE CBC W/AUTO DIFF WBC: CPT

## 2024-05-14 PROCEDURE — 80048 BASIC METABOLIC PNL TOTAL CA: CPT

## 2024-05-14 PROCEDURE — 89220 SPUTUM SPECIMEN COLLECTION: CPT

## 2024-05-14 PROCEDURE — 94664 DEMO&/EVAL PT USE INHALER: CPT

## 2024-05-14 RX ORDER — BUDESONIDE AND FORMOTEROL FUMARATE DIHYDRATE 160; 4.5 UG/1; UG/1
2 AEROSOL RESPIRATORY (INHALATION) 2 TIMES DAILY
Qty: 10.2 G | Refills: 3 | Status: SHIPPED | OUTPATIENT
Start: 2024-05-14

## 2024-05-14 RX ORDER — IPRATROPIUM BROMIDE AND ALBUTEROL SULFATE 2.5; .5 MG/3ML; MG/3ML
1 SOLUTION RESPIRATORY (INHALATION) EVERY 6 HOURS
Qty: 360 ML | Refills: 2 | Status: SHIPPED | OUTPATIENT
Start: 2024-05-14

## 2024-05-14 RX ORDER — AZITHROMYCIN 500 MG/1
500 TABLET, FILM COATED ORAL DAILY
Qty: 3 TABLET | Refills: 0 | Status: SHIPPED | OUTPATIENT
Start: 2024-05-14 | End: 2024-05-17

## 2024-05-14 RX ORDER — PREDNISONE 20 MG/1
40 TABLET ORAL DAILY
Qty: 8 TABLET | Refills: 0 | Status: SHIPPED | OUTPATIENT
Start: 2024-05-14 | End: 2024-05-18

## 2024-05-14 RX ORDER — ALBUTEROL SULFATE 90 UG/1
2 AEROSOL, METERED RESPIRATORY (INHALATION) EVERY 4 HOURS PRN
Qty: 18 G | Refills: 5 | Status: SHIPPED | OUTPATIENT
Start: 2024-05-14

## 2024-05-14 RX ADMIN — PREDNISONE 40 MG: 20 TABLET ORAL at 10:00

## 2024-05-14 RX ADMIN — POTASSIUM CHLORIDE 10 MEQ: 1500 TABLET, EXTENDED RELEASE ORAL at 10:00

## 2024-05-14 RX ADMIN — IPRATROPIUM BROMIDE AND ALBUTEROL SULFATE 1 DOSE: 2.5; .5 SOLUTION RESPIRATORY (INHALATION) at 11:28

## 2024-05-14 RX ADMIN — ASPIRIN 81 MG: 81 TABLET, CHEWABLE ORAL at 10:00

## 2024-05-14 RX ADMIN — GUAIFENESIN 600 MG: 600 TABLET, EXTENDED RELEASE ORAL at 10:00

## 2024-05-14 RX ADMIN — BUDESONIDE AND FORMOTEROL FUMARATE DIHYDRATE 2 PUFF: 160; 4.5 AEROSOL RESPIRATORY (INHALATION) at 07:45

## 2024-05-14 RX ADMIN — IPRATROPIUM BROMIDE AND ALBUTEROL SULFATE 1 DOSE: 2.5; .5 SOLUTION RESPIRATORY (INHALATION) at 03:55

## 2024-05-14 RX ADMIN — EZETIMIBE 10 MG: 10 TABLET ORAL at 10:02

## 2024-05-14 RX ADMIN — FUROSEMIDE 20 MG: 20 TABLET ORAL at 10:00

## 2024-05-14 RX ADMIN — IPRATROPIUM BROMIDE AND ALBUTEROL SULFATE 1 DOSE: 2.5; .5 SOLUTION RESPIRATORY (INHALATION) at 07:37

## 2024-05-14 NOTE — PROGRESS NOTES
4 Eyes Skin Assessment     NAME:  Eloina Roca  YOB: 1965  MEDICAL RECORD NUMBER:  [id number]    The patient is being assessed for  Admission    I agree that at least one RN has performed a thorough Head to Toe Skin Assessment on the patient. ALL assessment sites listed below have been assessed.      Areas assessed by both nurses:    Head, Face, Ears, Shoulders, Back, Chest, Arms, Elbows, Hands, Sacrum. Buttock, Coccyx, Ischium, and Legs. Feet and Heels        Does the Patient have a Wound? No noted wound(s)       Zachary Prevention initiated by RN: Yes  Wound Care Orders initiated by RN: No    Pressure Injury (Stage 3,4, Unstageable, DTI, NWPT, and Complex wounds) if present, place Wound referral order by RN under : No    New Ostomies, if present place, Ostomy referral order under : No     Nurse 1 eSignature: Electronically signed by Mary Clements RN on 5/13/24 at 11:52 PM EDT    **SHARE this note so that the co-signing nurse can place an eSignature**    Nurse 2 eSignature: {Esignature:[id number]}

## 2024-05-14 NOTE — ED NOTES
documented pain medication administered: na  NIH Score: NIH     Active LDA's:   Peripheral IV 05/13/24 Right Antecubital (Active)       Pertinent or High Risk Medications/Drips: no   If Yes, please provide details: no  Blood Product Administration: no  If Yes, please provide details: na    Recommendation    Incomplete orders meds  Additional Comments: no   If any further questions, please call Sending RN at [phone]    Electronically signed by: Electronically signed by Nathaniel Bess RN on 5/13/2024 at 8:42 PM

## 2024-05-14 NOTE — PROGRESS NOTES
Outpatient Pharmacy Progress Note for Meds-to-Beds    Total number of Prescriptions Filled: 5    Additional Documentation:  Medication(s) were delivered to the patient's room prior to discharge      Thank you for letting us serve your patients.  [address] [address]    Phone: [phone]    Fax: [phone]

## 2024-05-14 NOTE — H&P
160-4.5 MCG/ACT AERO inhale 2 puffs by mouth twice a day 10/4/22   Provider, MD David   aspirin EC 81 MG EC tablet Take 1 tablet by mouth daily 1/26/22   Shawnee Kothari APRN - CNP   furosemide (LASIX) 20 MG tablet Take 1 tablet by mouth daily 1/2/22   Leny Aguillon MD   potassium chloride (KLOR-CON M) 20 MEQ extended release tablet Take 0.5 tablets by mouth daily 1/2/22   Leny Aguillon MD   MUCINEX 600 MG extended release tablet take 1 tablet by mouth twice a day 1/11/18   Colt Colon MD       Medications:     Medications:    cefTRIAXone (ROCEPHIN) IV  1,000 mg IntraVENous Once    And    azithromycin  500 mg IntraVENous Once        Infusions:       PRN Meds:        Data:     CBC:   Recent Labs     05/13/24 1922   WBC 8.4   HGB 14.6      MCV 94.7   RDW 11.8   LYMPHOPCT 17.4*   MONOPCT 7.7*   EOSPCT 0.5   BASOPCT 0.6   MONOSABS 0.7   EOSABS 0.0   BASOSABS 0.1     CMP:    Recent Labs     05/13/24 1922      K 4.0      CO2 32   BUN 13   CREATININE 0.7   GLUCOSE 112*   CALCIUM 9.3   BILITOT 0.5   ALKPHOS 102   AST 21   ALT 13     Lipids:   Lab Results   Component Value Date/Time    CHOL 216 02/16/2023 07:51 AM    HDL 74 02/16/2023 07:51 AM    TRIG 88 02/16/2023 07:51 AM     Hemoglobin A1C:   Lab Results   Component Value Date/Time    LABA1C 5.6 04/11/2022 01:24 PM     TSH: No results found for: \"TSH\"  Troponin:   Lab Results   Component Value Date/Time    TROPONINT <0.010 05/15/2023 12:37 PM    TROPONINT 0.011 12/28/2021 08:31 PM     BNP: No results for input(s): \"PROBNP\" in the last 72 hours.  Lactic Acid: No results for input(s): \"LACTA\" in the last 72 hours.  UA:  Lab Results   Component Value Date/Time    NITRU NEGATIVE 04/11/2022 01:25 PM    COLORU YELLOW 04/11/2022 01:25 PM    PHUR 6.5 04/11/2022 01:25 PM    WBCUA 1 04/11/2022 01:25 PM    RBCUA <1 04/11/2022 01:25 PM    MUCUS RARE 04/11/2022 01:25 PM    TRICHOMONAS NONE SEEN 04/11/2022 01:25 PM    BACTERIA NEGATIVE

## 2024-05-14 NOTE — PROGRESS NOTES
Reviewed Discharge instructions with patient, and she received prescriptions from out patient pharmacy. Patient called ride to come. Paper also given to patient for nebulizer machine for home use. Patient already uses [employer] for oxygen.

## 2024-05-14 NOTE — DISCHARGE SUMMARY
Exam Performed:      /63   Pulse 90   Temp 97.7 °F (36.5 °C)   Resp 23   Ht 1.702 m (5' 7\")   Wt 57.2 kg (126 lb 1.7 oz)   SpO2 94%   BMI 19.75 kg/m²     05/14/24    General appearance:  No apparent distress, appears chronically ill stated age and cooperative.  Respiratory:  Normal respiratory effort.   Cardiovascular:  Regular rate and rhythm.  Abdomen:  Soft, non-tender, non-distended.  Musculoskeletal:  No edema  Neurologic:  Non-focal  Psychiatric:  Alert and oriented    Patient Discharge Instructions:      Follow up:    1.  Primary Care Provider Leny Aguillon MD in the next 1-2 weeks.  2.  Pulmonology-     The patient was seen and examined on day of discharge and this discharge summary is in conjunction with any daily progress note from day of discharge. Time spent on discharge: 35 minutes in the examination, evaluation, counseling and review of medications and discharge plan.    ------------------------------------------------------------------------------------------------------------------------------------------------------    Discharge Medications:   Current Discharge Medication List        START taking these medications    Details   predniSONE (DELTASONE) 20 MG tablet Take 2 tablets by mouth daily for 4 doses  Qty: 8 tablet, Refills: 0      azithromycin (ZITHROMAX) 500 MG tablet Take 1 tablet by mouth daily for 3 days  Qty: 3 tablet, Refills: 0      ipratropium 0.5 mg-albuterol 2.5 mg (DUONEB) 0.5-2.5 (3) MG/3ML SOLN nebulizer solution Inhale 3 mLs into the lungs every 6 hours  Qty: 360 mL, Refills: 2           Current Discharge Medication List        CONTINUE these medications which have CHANGED    Details   SYMBICORT 160-4.5 MCG/ACT AERO Inhale 2 puffs into the lungs 2 times daily  Qty: 10.2 g, Refills: 3      albuterol sulfate HFA (PROVENTIL;VENTOLIN;PROAIR) 108 (90 Base) MCG/ACT inhaler Inhale 2 puffs into the lungs every 4 hours as needed for Wheezing or Shortness of

## 2024-05-18 LAB
CULTURE: NORMAL
CULTURE: NORMAL
Lab: NORMAL
Lab: NORMAL
SPECIMEN: NORMAL
SPECIMEN: NORMAL

## 2025-01-22 ENCOUNTER — APPOINTMENT (OUTPATIENT)
Dept: GENERAL RADIOLOGY | Age: 60
DRG: 133 | End: 2025-01-22
Payer: COMMERCIAL

## 2025-01-22 ENCOUNTER — HOSPITAL ENCOUNTER (INPATIENT)
Age: 60
LOS: 5 days | Discharge: HOSPICE | DRG: 133 | End: 2025-01-27
Attending: EMERGENCY MEDICINE | Admitting: STUDENT IN AN ORGANIZED HEALTH CARE EDUCATION/TRAINING PROGRAM
Payer: COMMERCIAL

## 2025-01-22 ENCOUNTER — APPOINTMENT (OUTPATIENT)
Dept: NON INVASIVE DIAGNOSTICS | Age: 60
DRG: 133 | End: 2025-01-22
Attending: STUDENT IN AN ORGANIZED HEALTH CARE EDUCATION/TRAINING PROGRAM
Payer: COMMERCIAL

## 2025-01-22 DIAGNOSIS — J96.01 ACUTE RESPIRATORY FAILURE WITH HYPOXIA AND HYPERCAPNIA: ICD-10-CM

## 2025-01-22 DIAGNOSIS — J44.1 COPD EXACERBATION (HCC): ICD-10-CM

## 2025-01-22 DIAGNOSIS — J10.1 INFLUENZA A: ICD-10-CM

## 2025-01-22 DIAGNOSIS — J96.02 ACUTE RESPIRATORY FAILURE WITH HYPOXIA AND HYPERCAPNIA: ICD-10-CM

## 2025-01-22 DIAGNOSIS — R06.02 SHORTNESS OF BREATH: ICD-10-CM

## 2025-01-22 DIAGNOSIS — J96.21 ACUTE ON CHRONIC RESPIRATORY FAILURE WITH HYPOXIA: Primary | ICD-10-CM

## 2025-01-22 PROBLEM — J96.00 ACUTE RESPIRATORY FAILURE: Status: ACTIVE | Noted: 2025-01-22

## 2025-01-22 PROBLEM — E44.0 MODERATE MALNUTRITION (HCC): Chronic | Status: ACTIVE | Noted: 2023-05-16

## 2025-01-22 LAB
ALBUMIN SERPL-MCNC: 4.6 G/DL (ref 3.4–5)
ALBUMIN/GLOB SERPL: 1.8 {RATIO} (ref 1.1–2.2)
ALP SERPL-CCNC: 96 U/L (ref 40–129)
ALT SERPL-CCNC: 15 U/L (ref 10–40)
ANION GAP SERPL CALCULATED.3IONS-SCNC: 13 MMOL/L (ref 9–17)
ARTERIAL PATENCY WRIST A: ABNORMAL
ARTERIAL PATENCY WRIST A: ABNORMAL
AST SERPL-CCNC: 32 U/L (ref 15–37)
B PARAP IS1001 DNA NPH QL NAA+NON-PROBE: NOT DETECTED
B PERT DNA SPEC QL NAA+PROBE: NOT DETECTED
BILIRUB DIRECT SERPL-MCNC: <0.2 MG/DL (ref 0–0.3)
BILIRUB INDIRECT SERPL-MCNC: NORMAL MG/DL (ref 0–0.7)
BILIRUB SERPL-MCNC: 0.3 MG/DL (ref 0–1)
BNP SERPL-MCNC: 424 PG/ML (ref 0–125)
BODY TEMPERATURE: 37
BODY TEMPERATURE: 37
BUN SERPL-MCNC: 19 MG/DL (ref 7–20)
C PNEUM DNA NPH QL NAA+NON-PROBE: NOT DETECTED
CALCIUM SERPL-MCNC: 9.6 MG/DL (ref 8.3–10.6)
CHLORIDE SERPL-SCNC: 96 MMOL/L (ref 99–110)
CO2 SERPL-SCNC: 30 MMOL/L (ref 21–32)
COHGB MFR BLD: 0.5 % (ref 0.5–1.5)
COHGB MFR BLD: 0.9 % (ref 0.5–1.5)
CREAT SERPL-MCNC: 0.7 MG/DL (ref 0.6–1.2)
ECHO AO ROOT DIAM: 2.9 CM
ECHO AO ROOT INDEX: 1.81 CM/M2
ECHO AV AREA PEAK VELOCITY: 2.6 CM2
ECHO AV AREA VTI: 2.4 CM2
ECHO AV AREA/BSA PEAK VELOCITY: 1.6 CM2/M2
ECHO AV AREA/BSA VTI: 1.5 CM2/M2
ECHO AV MEAN GRADIENT: 3 MMHG
ECHO AV MEAN VELOCITY: 0.8 M/S
ECHO AV PEAK GRADIENT: 6 MMHG
ECHO AV PEAK VELOCITY: 1.2 M/S
ECHO AV VELOCITY RATIO: 0.83
ECHO AV VTI: 17.5 CM
ECHO EST RA PRESSURE: 8 MMHG
ECHO LA AREA 4C: 13.8 CM2
ECHO LA MAJOR AXIS: 4.1 CM
ECHO LA VOL MOD A4C: 38 ML (ref 22–52)
ECHO LA VOLUME INDEX MOD A4C: 24 ML/M2 (ref 16–34)
ECHO LV EF PHYSICIAN: 55 %
ECHO LV FRACTIONAL SHORTENING: 28 % (ref 28–44)
ECHO LV INTERNAL DIMENSION DIASTOLE INDEX: 2 CM/M2
ECHO LV INTERNAL DIMENSION DIASTOLIC: 3.2 CM (ref 3.9–5.3)
ECHO LV INTERNAL DIMENSION SYSTOLIC INDEX: 1.44 CM/M2
ECHO LV INTERNAL DIMENSION SYSTOLIC: 2.3 CM
ECHO LV IVSD: 0.8 CM (ref 0.6–0.9)
ECHO LV MASS 2D: 71.2 G (ref 67–162)
ECHO LV MASS INDEX 2D: 44.5 G/M2 (ref 43–95)
ECHO LV POSTERIOR WALL DIASTOLIC: 0.9 CM (ref 0.6–0.9)
ECHO LV RELATIVE WALL THICKNESS RATIO: 0.56
ECHO LVOT AREA: 3.1 CM2
ECHO LVOT AV VTI INDEX: 0.78
ECHO LVOT DIAM: 2 CM
ECHO LVOT MEAN GRADIENT: 2 MMHG
ECHO LVOT PEAK GRADIENT: 4 MMHG
ECHO LVOT PEAK VELOCITY: 1 M/S
ECHO LVOT STROKE VOLUME INDEX: 26.7 ML/M2
ECHO LVOT SV: 42.7 ML
ECHO LVOT VTI: 13.6 CM
ECHO RIGHT VENTRICULAR SYSTOLIC PRESSURE (RVSP): 33 MMHG
ECHO RV FREE WALL PEAK S': 16.2 CM/S
ECHO RV TAPSE: 1.4 CM (ref 1.7–?)
ECHO TV REGURGITANT MAX VELOCITY: 2.5 M/S
ECHO TV REGURGITANT PEAK GRADIENT: 25 MMHG
FLUAV H1 2009 PAN RNA NPH NAA+NON-PROBE: DETECTED
FLUAV RNA NPH QL NAA+NON-PROBE: DETECTED
FLUBV RNA NPH QL NAA+NON-PROBE: NOT DETECTED
GFR, ESTIMATED: 87 ML/MIN/1.73M2
GLUCOSE SERPL-MCNC: 117 MG/DL (ref 74–99)
HADV DNA NPH QL NAA+NON-PROBE: NOT DETECTED
HCO3 VENOUS: 27.4 MMOL/L (ref 22–29)
HCO3 VENOUS: 32.4 MMOL/L (ref 22–29)
HCOV 229E RNA NPH QL NAA+NON-PROBE: NOT DETECTED
HCOV HKU1 RNA NPH QL NAA+NON-PROBE: NOT DETECTED
HCOV NL63 RNA NPH QL NAA+NON-PROBE: NOT DETECTED
HCOV OC43 RNA NPH QL NAA+NON-PROBE: NOT DETECTED
HMPV RNA NPH QL NAA+NON-PROBE: NOT DETECTED
HPIV1 RNA NPH QL NAA+NON-PROBE: NOT DETECTED
HPIV2 RNA NPH QL NAA+NON-PROBE: NOT DETECTED
HPIV3 RNA NPH QL NAA+NON-PROBE: NOT DETECTED
HPIV4 RNA NPH QL NAA+NON-PROBE: NOT DETECTED
L PNEUMO1 AG UR QL IA.RAPID: NEGATIVE
LACTATE BLDV-SCNC: 1.7 MMOL/L (ref 0.4–2)
M PNEUMO DNA NPH QL NAA+NON-PROBE: NOT DETECTED
METHEMOGLOBIN: 0.4 % (ref 0.5–1.5)
METHEMOGLOBIN: 0.6 % (ref 0.5–1.5)
MRSA, DNA, NASAL: NOT DETECTED
NEGATIVE BASE EXCESS, VEN: 2.1 MMOL/L (ref 0–3)
OXYHGB MFR BLD: 82.9 %
OXYHGB MFR BLD: 90.7 %
PCO2 VENOUS: 68.4 MM HG (ref 38–54)
PCO2 VENOUS: 89.8 MM HG (ref 38–54)
PH VENOUS: 7.17 (ref 7.32–7.43)
PH VENOUS: 7.22 (ref 7.32–7.43)
PO2 VENOUS: 51.2 MM HG (ref 23–48)
PO2 VENOUS: 73.2 MM HG (ref 23–48)
POSITIVE BASE EXCESS, VEN: 1 MMOL/L (ref 0–3)
POTASSIUM SERPL-SCNC: 4.6 MMOL/L (ref 3.5–5.1)
PROCALCITONIN SERPL-MCNC: 0.13 NG/ML
PROT SERPL-MCNC: 7 G/DL (ref 6.4–8.2)
RSV RNA NPH QL NAA+NON-PROBE: NOT DETECTED
RV+EV RNA NPH QL NAA+NON-PROBE: NOT DETECTED
S PNEUM AG SPEC QL: NEGATIVE
SARS-COV-2 RNA NPH QL NAA+NON-PROBE: NOT DETECTED
SODIUM SERPL-SCNC: 138 MMOL/L (ref 136–145)
SPECIMEN DESCRIPTION: ABNORMAL
SPECIMEN DESCRIPTION: NORMAL
SPECIMEN SOURCE: NORMAL
TEXT FOR RESPIRATORY: ABNORMAL
TEXT FOR RESPIRATORY: ABNORMAL
TROPONIN I SERPL HS-MCNC: 31 NG/L (ref 0–14)
TROPONIN I SERPL HS-MCNC: 36 NG/L (ref 0–14)

## 2025-01-22 PROCEDURE — 99291 CRITICAL CARE FIRST HOUR: CPT

## 2025-01-22 PROCEDURE — 2580000003 HC RX 258: Performed by: STUDENT IN AN ORGANIZED HEALTH CARE EDUCATION/TRAINING PROGRAM

## 2025-01-22 PROCEDURE — 36415 COLL VENOUS BLD VENIPUNCTURE: CPT

## 2025-01-22 PROCEDURE — 6360000002 HC RX W HCPCS: Performed by: EMERGENCY MEDICINE

## 2025-01-22 PROCEDURE — 51702 INSERT TEMP BLADDER CATH: CPT

## 2025-01-22 PROCEDURE — 84484 ASSAY OF TROPONIN QUANT: CPT

## 2025-01-22 PROCEDURE — 0BH17EZ INSERTION OF ENDOTRACHEAL AIRWAY INTO TRACHEA, VIA NATURAL OR ARTIFICIAL OPENING: ICD-10-PCS | Performed by: EMERGENCY MEDICINE

## 2025-01-22 PROCEDURE — 93005 ELECTROCARDIOGRAM TRACING: CPT | Performed by: EMERGENCY MEDICINE

## 2025-01-22 PROCEDURE — 93306 TTE W/DOPPLER COMPLETE: CPT

## 2025-01-22 PROCEDURE — 0202U NFCT DS 22 TRGT SARS-COV-2: CPT

## 2025-01-22 PROCEDURE — 2500000003 HC RX 250 WO HCPCS

## 2025-01-22 PROCEDURE — 6360000002 HC RX W HCPCS: Performed by: STUDENT IN AN ORGANIZED HEALTH CARE EDUCATION/TRAINING PROGRAM

## 2025-01-22 PROCEDURE — 6370000000 HC RX 637 (ALT 250 FOR IP): Performed by: EMERGENCY MEDICINE

## 2025-01-22 PROCEDURE — 2700000000 HC OXYGEN THERAPY PER DAY

## 2025-01-22 PROCEDURE — 80053 COMPREHEN METABOLIC PANEL: CPT

## 2025-01-22 PROCEDURE — 83605 ASSAY OF LACTIC ACID: CPT

## 2025-01-22 PROCEDURE — 31500 INSERT EMERGENCY AIRWAY: CPT

## 2025-01-22 PROCEDURE — 87070 CULTURE OTHR SPECIMN AEROBIC: CPT

## 2025-01-22 PROCEDURE — 96365 THER/PROPH/DIAG IV INF INIT: CPT

## 2025-01-22 PROCEDURE — 87449 NOS EACH ORGANISM AG IA: CPT

## 2025-01-22 PROCEDURE — 87899 AGENT NOS ASSAY W/OPTIC: CPT

## 2025-01-22 PROCEDURE — 87633 RESP VIRUS 12-25 TARGETS: CPT

## 2025-01-22 PROCEDURE — 93306 TTE W/DOPPLER COMPLETE: CPT | Performed by: INTERNAL MEDICINE

## 2025-01-22 PROCEDURE — 6370000000 HC RX 637 (ALT 250 FOR IP): Performed by: STUDENT IN AN ORGANIZED HEALTH CARE EDUCATION/TRAINING PROGRAM

## 2025-01-22 PROCEDURE — 87205 SMEAR GRAM STAIN: CPT

## 2025-01-22 PROCEDURE — 83880 ASSAY OF NATRIURETIC PEPTIDE: CPT

## 2025-01-22 PROCEDURE — 82805 BLOOD GASES W/O2 SATURATION: CPT

## 2025-01-22 PROCEDURE — 2580000003 HC RX 258: Performed by: EMERGENCY MEDICINE

## 2025-01-22 PROCEDURE — 94002 VENT MGMT INPAT INIT DAY: CPT

## 2025-01-22 PROCEDURE — 74018 RADEX ABDOMEN 1 VIEW: CPT

## 2025-01-22 PROCEDURE — 87641 MR-STAPH DNA AMP PROBE: CPT

## 2025-01-22 PROCEDURE — 96375 TX/PRO/DX INJ NEW DRUG ADDON: CPT

## 2025-01-22 PROCEDURE — 2000000000 HC ICU R&B

## 2025-01-22 PROCEDURE — 87040 BLOOD CULTURE FOR BACTERIA: CPT

## 2025-01-22 PROCEDURE — 71045 X-RAY EXAM CHEST 1 VIEW: CPT

## 2025-01-22 PROCEDURE — 94640 AIRWAY INHALATION TREATMENT: CPT

## 2025-01-22 PROCEDURE — 82248 BILIRUBIN DIRECT: CPT

## 2025-01-22 PROCEDURE — 5A0935A ASSISTANCE WITH RESPIRATORY VENTILATION, LESS THAN 24 CONSECUTIVE HOURS, HIGH NASAL FLOW/VELOCITY: ICD-10-PCS | Performed by: INTERNAL MEDICINE

## 2025-01-22 PROCEDURE — 6360000002 HC RX W HCPCS

## 2025-01-22 PROCEDURE — 5A1935Z RESPIRATORY VENTILATION, LESS THAN 24 CONSECUTIVE HOURS: ICD-10-PCS | Performed by: STUDENT IN AN ORGANIZED HEALTH CARE EDUCATION/TRAINING PROGRAM

## 2025-01-22 PROCEDURE — 5A09457 ASSISTANCE WITH RESPIRATORY VENTILATION, 24-96 CONSECUTIVE HOURS, CONTINUOUS POSITIVE AIRWAY PRESSURE: ICD-10-PCS | Performed by: EMERGENCY MEDICINE

## 2025-01-22 PROCEDURE — 84145 PROCALCITONIN (PCT): CPT

## 2025-01-22 PROCEDURE — 2500000003 HC RX 250 WO HCPCS: Performed by: STUDENT IN AN ORGANIZED HEALTH CARE EDUCATION/TRAINING PROGRAM

## 2025-01-22 PROCEDURE — 2500000003 HC RX 250 WO HCPCS: Performed by: EMERGENCY MEDICINE

## 2025-01-22 RX ORDER — MAGNESIUM SULFATE IN WATER 40 MG/ML
2000 INJECTION, SOLUTION INTRAVENOUS PRN
Status: DISCONTINUED | OUTPATIENT
Start: 2025-01-22 | End: 2025-01-27 | Stop reason: HOSPADM

## 2025-01-22 RX ORDER — POLYETHYLENE GLYCOL 3350 17 G/17G
17 POWDER, FOR SOLUTION ORAL DAILY PRN
Status: DISCONTINUED | OUTPATIENT
Start: 2025-01-22 | End: 2025-01-27 | Stop reason: HOSPADM

## 2025-01-22 RX ORDER — ACETAMINOPHEN 650 MG/1
650 SUPPOSITORY RECTAL EVERY 6 HOURS PRN
Status: DISCONTINUED | OUTPATIENT
Start: 2025-01-22 | End: 2025-01-22

## 2025-01-22 RX ORDER — POTASSIUM CHLORIDE 7.45 MG/ML
10 INJECTION INTRAVENOUS PRN
Status: DISCONTINUED | OUTPATIENT
Start: 2025-01-22 | End: 2025-01-27 | Stop reason: HOSPADM

## 2025-01-22 RX ORDER — LANOLIN ALCOHOL/MO/W.PET/CERES
100 CREAM (GRAM) TOPICAL DAILY
Status: DISCONTINUED | OUTPATIENT
Start: 2025-01-22 | End: 2025-01-27 | Stop reason: HOSPADM

## 2025-01-22 RX ORDER — CARBOXYMETHYLCELLULOSE SODIUM 10 MG/ML
1 GEL OPHTHALMIC EVERY 4 HOURS
Status: DISCONTINUED | OUTPATIENT
Start: 2025-01-22 | End: 2025-01-23

## 2025-01-22 RX ORDER — DEXMEDETOMIDINE HYDROCHLORIDE 4 UG/ML
.1-1.5 INJECTION, SOLUTION INTRAVENOUS CONTINUOUS
Status: DISCONTINUED | OUTPATIENT
Start: 2025-01-22 | End: 2025-01-22

## 2025-01-22 RX ORDER — LORAZEPAM 2 MG/ML
0.5 INJECTION INTRAMUSCULAR ONCE
Status: COMPLETED | OUTPATIENT
Start: 2025-01-22 | End: 2025-01-22

## 2025-01-22 RX ORDER — POTASSIUM CHLORIDE 29.8 MG/ML
20 INJECTION INTRAVENOUS PRN
Status: DISCONTINUED | OUTPATIENT
Start: 2025-01-22 | End: 2025-01-27 | Stop reason: HOSPADM

## 2025-01-22 RX ORDER — DEXAMETHASONE SODIUM PHOSPHATE 10 MG/ML
6 INJECTION, SOLUTION INTRAMUSCULAR; INTRAVENOUS ONCE
Status: COMPLETED | OUTPATIENT
Start: 2025-01-22 | End: 2025-01-22

## 2025-01-22 RX ORDER — FOLIC ACID 1 MG/1
1 TABLET ORAL DAILY
Status: DISCONTINUED | OUTPATIENT
Start: 2025-01-22 | End: 2025-01-27 | Stop reason: HOSPADM

## 2025-01-22 RX ORDER — OSELTAMIVIR PHOSPHATE 6 MG/ML
75 FOR SUSPENSION ORAL 2 TIMES DAILY
Status: DISCONTINUED | OUTPATIENT
Start: 2025-01-22 | End: 2025-01-23

## 2025-01-22 RX ORDER — ONDANSETRON 4 MG/1
4 TABLET, ORALLY DISINTEGRATING ORAL EVERY 8 HOURS PRN
Status: DISCONTINUED | OUTPATIENT
Start: 2025-01-22 | End: 2025-01-27 | Stop reason: HOSPADM

## 2025-01-22 RX ORDER — ONDANSETRON 2 MG/ML
8 INJECTION INTRAMUSCULAR; INTRAVENOUS ONCE
Status: COMPLETED | OUTPATIENT
Start: 2025-01-22 | End: 2025-01-22

## 2025-01-22 RX ORDER — ATORVASTATIN CALCIUM 40 MG/1
40 TABLET, FILM COATED ORAL NIGHTLY
Status: DISCONTINUED | OUTPATIENT
Start: 2025-01-22 | End: 2025-01-27 | Stop reason: HOSPADM

## 2025-01-22 RX ORDER — ETOMIDATE 2 MG/ML
20 INJECTION INTRAVENOUS ONCE
Status: COMPLETED | OUTPATIENT
Start: 2025-01-22 | End: 2025-01-22

## 2025-01-22 RX ORDER — FENTANYL CITRATE-0.9 % NACL/PF 10 MCG/ML
25-200 PLASTIC BAG, INJECTION (ML) INTRAVENOUS CONTINUOUS
Status: DISCONTINUED | OUTPATIENT
Start: 2025-01-22 | End: 2025-01-22

## 2025-01-22 RX ORDER — CHLORHEXIDINE GLUCONATE ORAL RINSE 1.2 MG/ML
15 SOLUTION DENTAL 2 TIMES DAILY
Status: DISCONTINUED | OUTPATIENT
Start: 2025-01-22 | End: 2025-01-23

## 2025-01-22 RX ORDER — OSELTAMIVIR PHOSPHATE 75 MG/1
75 CAPSULE ORAL ONCE
Status: COMPLETED | OUTPATIENT
Start: 2025-01-22 | End: 2025-01-22

## 2025-01-22 RX ORDER — SUCCINYLCHOLINE/SOD CL,ISO/PF 100 MG/5ML
100 SYRINGE (ML) INTRAVENOUS ONCE
Status: COMPLETED | OUTPATIENT
Start: 2025-01-22 | End: 2025-01-22

## 2025-01-22 RX ORDER — PROPOFOL 10 MG/ML
5-50 INJECTION, EMULSION INTRAVENOUS CONTINUOUS
Status: DISCONTINUED | OUTPATIENT
Start: 2025-01-22 | End: 2025-01-22

## 2025-01-22 RX ORDER — MINERAL OIL AND WHITE PETROLATUM 150; 830 MG/G; MG/G
OINTMENT OPHTHALMIC EVERY 4 HOURS
Status: DISCONTINUED | OUTPATIENT
Start: 2025-01-22 | End: 2025-01-23

## 2025-01-22 RX ORDER — DEXMEDETOMIDINE HYDROCHLORIDE 4 UG/ML
.1-1.5 INJECTION, SOLUTION INTRAVENOUS CONTINUOUS
Status: DISCONTINUED | OUTPATIENT
Start: 2025-01-22 | End: 2025-01-23

## 2025-01-22 RX ORDER — SODIUM CHLORIDE 9 MG/ML
INJECTION, SOLUTION INTRAVENOUS PRN
Status: DISCONTINUED | OUTPATIENT
Start: 2025-01-22 | End: 2025-01-27 | Stop reason: HOSPADM

## 2025-01-22 RX ORDER — ONDANSETRON 2 MG/ML
4 INJECTION INTRAMUSCULAR; INTRAVENOUS EVERY 6 HOURS PRN
Status: DISCONTINUED | OUTPATIENT
Start: 2025-01-22 | End: 2025-01-27 | Stop reason: HOSPADM

## 2025-01-22 RX ORDER — BUDESONIDE AND FORMOTEROL FUMARATE DIHYDRATE 160; 4.5 UG/1; UG/1
2 AEROSOL RESPIRATORY (INHALATION)
Status: DISCONTINUED | OUTPATIENT
Start: 2025-01-22 | End: 2025-01-27 | Stop reason: HOSPADM

## 2025-01-22 RX ORDER — SODIUM CHLORIDE 0.9 % (FLUSH) 0.9 %
5-40 SYRINGE (ML) INJECTION EVERY 12 HOURS SCHEDULED
Status: DISCONTINUED | OUTPATIENT
Start: 2025-01-22 | End: 2025-01-27 | Stop reason: HOSPADM

## 2025-01-22 RX ORDER — IPRATROPIUM BROMIDE AND ALBUTEROL SULFATE 2.5; .5 MG/3ML; MG/3ML
2 SOLUTION RESPIRATORY (INHALATION) ONCE
Status: COMPLETED | OUTPATIENT
Start: 2025-01-22 | End: 2025-01-22

## 2025-01-22 RX ORDER — ENOXAPARIN SODIUM 100 MG/ML
40 INJECTION SUBCUTANEOUS DAILY
Status: DISCONTINUED | OUTPATIENT
Start: 2025-01-22 | End: 2025-01-27 | Stop reason: HOSPADM

## 2025-01-22 RX ORDER — ACETAMINOPHEN 325 MG/1
650 TABLET ORAL EVERY 6 HOURS PRN
Status: DISCONTINUED | OUTPATIENT
Start: 2025-01-22 | End: 2025-01-22

## 2025-01-22 RX ORDER — MAGNESIUM SULFATE IN WATER 40 MG/ML
2000 INJECTION, SOLUTION INTRAVENOUS ONCE
Status: COMPLETED | OUTPATIENT
Start: 2025-01-22 | End: 2025-01-22

## 2025-01-22 RX ORDER — IPRATROPIUM BROMIDE AND ALBUTEROL SULFATE 2.5; .5 MG/3ML; MG/3ML
1 SOLUTION RESPIRATORY (INHALATION)
Status: DISCONTINUED | OUTPATIENT
Start: 2025-01-22 | End: 2025-01-27 | Stop reason: HOSPADM

## 2025-01-22 RX ORDER — ACETAMINOPHEN 160 MG/5ML
650 SUSPENSION ORAL EVERY 6 HOURS SCHEDULED
Status: DISCONTINUED | OUTPATIENT
Start: 2025-01-22 | End: 2025-01-22 | Stop reason: SDUPTHER

## 2025-01-22 RX ORDER — DIPHENHYDRAMINE HYDROCHLORIDE 50 MG/ML
25 INJECTION INTRAMUSCULAR; INTRAVENOUS ONCE
Status: COMPLETED | OUTPATIENT
Start: 2025-01-22 | End: 2025-01-22

## 2025-01-22 RX ORDER — SODIUM CHLORIDE 0.9 % (FLUSH) 0.9 %
5-40 SYRINGE (ML) INJECTION PRN
Status: DISCONTINUED | OUTPATIENT
Start: 2025-01-22 | End: 2025-01-27 | Stop reason: HOSPADM

## 2025-01-22 RX ORDER — ACETAMINOPHEN 160 MG/5ML
650 LIQUID ORAL EVERY 6 HOURS SCHEDULED
Status: DISCONTINUED | OUTPATIENT
Start: 2025-01-22 | End: 2025-01-27 | Stop reason: HOSPADM

## 2025-01-22 RX ORDER — MULTIVIT-MIN/FERROUS GLUCONATE 9 MG/15 ML
15 LIQUID (ML) ORAL DAILY
Status: DISCONTINUED | OUTPATIENT
Start: 2025-01-22 | End: 2025-01-27 | Stop reason: HOSPADM

## 2025-01-22 RX ORDER — ASPIRIN 81 MG/1
81 TABLET, CHEWABLE ORAL DAILY
Status: DISCONTINUED | OUTPATIENT
Start: 2025-01-22 | End: 2025-01-27 | Stop reason: HOSPADM

## 2025-01-22 RX ORDER — 0.9 % SODIUM CHLORIDE 0.9 %
1000 INTRAVENOUS SOLUTION INTRAVENOUS ONCE
Status: COMPLETED | OUTPATIENT
Start: 2025-01-22 | End: 2025-01-22

## 2025-01-22 RX ORDER — ZINC SULFATE 50(220)MG
50 CAPSULE ORAL DAILY
Status: DISCONTINUED | OUTPATIENT
Start: 2025-01-22 | End: 2025-01-27 | Stop reason: HOSPADM

## 2025-01-22 RX ADMIN — CARBOXYMETHYLCELLULOSE SODIUM 1 DROP: 10 GEL OPHTHALMIC at 20:39

## 2025-01-22 RX ADMIN — DEXMEDETOMIDINE HYDROCHLORIDE 0.2 MCG/KG/HR: 4 INJECTION, SOLUTION INTRAVENOUS at 21:59

## 2025-01-22 RX ADMIN — FOLIC ACID 1 MG: 1 TABLET ORAL at 07:56

## 2025-01-22 RX ADMIN — SODIUM CHLORIDE, PRESERVATIVE FREE 10 ML: 5 INJECTION INTRAVENOUS at 06:28

## 2025-01-22 RX ADMIN — ASPIRIN 81 MG CHEWABLE TABLET 81 MG: 81 TABLET CHEWABLE at 07:56

## 2025-01-22 RX ADMIN — IPRATROPIUM BROMIDE AND ALBUTEROL SULFATE 1 DOSE: 2.5; .5 SOLUTION RESPIRATORY (INHALATION) at 15:58

## 2025-01-22 RX ADMIN — ATORVASTATIN CALCIUM 40 MG: 40 TABLET, FILM COATED ORAL at 20:40

## 2025-01-22 RX ADMIN — IPRATROPIUM BROMIDE AND ALBUTEROL SULFATE 1 DOSE: 2.5; .5 SOLUTION RESPIRATORY (INHALATION) at 19:31

## 2025-01-22 RX ADMIN — SODIUM CHLORIDE 1000 ML: 9 INJECTION, SOLUTION INTRAVENOUS at 03:20

## 2025-01-22 RX ADMIN — Medication 100 MG: at 07:56

## 2025-01-22 RX ADMIN — PROPOFOL 30 MG/HR: 10 INJECTION, EMULSION INTRAVENOUS at 12:05

## 2025-01-22 RX ADMIN — OSELTAMIVIR PHOSPHATE 75 MG: 6 POWDER, FOR SUSPENSION ORAL at 09:02

## 2025-01-22 RX ADMIN — WATER 60 MG: 1 INJECTION INTRAMUSCULAR; INTRAVENOUS; SUBCUTANEOUS at 22:05

## 2025-01-22 RX ADMIN — ACETAMINOPHEN 650 MG: 650 SOLUTION ORAL at 11:20

## 2025-01-22 RX ADMIN — Medication 15 ML: at 07:46

## 2025-01-22 RX ADMIN — SODIUM CHLORIDE, PRESERVATIVE FREE 10 ML: 5 INJECTION INTRAVENOUS at 20:40

## 2025-01-22 RX ADMIN — ACETAMINOPHEN 650 MG: 650 SUPPOSITORY RECTAL at 06:29

## 2025-01-22 RX ADMIN — PROPOFOL 10 MG/HR: 10 INJECTION, EMULSION INTRAVENOUS at 03:25

## 2025-01-22 RX ADMIN — ZINC SULFATE 220 MG (50 MG) CAPSULE 50 MG: CAPSULE at 07:56

## 2025-01-22 RX ADMIN — WATER 60 MG: 1 INJECTION INTRAMUSCULAR; INTRAVENOUS; SUBCUTANEOUS at 17:09

## 2025-01-22 RX ADMIN — ENOXAPARIN SODIUM 40 MG: 100 INJECTION SUBCUTANEOUS at 09:08

## 2025-01-22 RX ADMIN — LORAZEPAM 0.5 MG: 2 INJECTION INTRAMUSCULAR; INTRAVENOUS at 02:46

## 2025-01-22 RX ADMIN — CHLORHEXIDINE GLUCONATE 0.12% ORAL RINSE 15 ML: 1.2 LIQUID ORAL at 20:39

## 2025-01-22 RX ADMIN — ETOMIDATE 20 MG: 2 INJECTION INTRAVENOUS at 03:09

## 2025-01-22 RX ADMIN — CARBOXYMETHYLCELLULOSE SODIUM 1 DROP: 10 GEL OPHTHALMIC at 17:28

## 2025-01-22 RX ADMIN — BUDESONIDE AND FORMOTEROL FUMARATE DIHYDRATE 2 PUFF: 160; 4.5 AEROSOL RESPIRATORY (INHALATION) at 08:13

## 2025-01-22 RX ADMIN — IPRATROPIUM BROMIDE AND ALBUTEROL SULFATE 1 DOSE: 2.5; .5 SOLUTION RESPIRATORY (INHALATION) at 12:36

## 2025-01-22 RX ADMIN — IPRATROPIUM BROMIDE AND ALBUTEROL SULFATE 1 DOSE: 2.5; .5 SOLUTION RESPIRATORY (INHALATION) at 04:43

## 2025-01-22 RX ADMIN — WATER 60 MG: 1 INJECTION INTRAMUSCULAR; INTRAVENOUS; SUBCUTANEOUS at 09:08

## 2025-01-22 RX ADMIN — Medication 50 MCG/HR: at 06:37

## 2025-01-22 RX ADMIN — Medication 100 MG: at 03:09

## 2025-01-22 RX ADMIN — SODIUM CHLORIDE, PRESERVATIVE FREE 10 ML: 5 INJECTION INTRAVENOUS at 07:57

## 2025-01-22 RX ADMIN — CEFTRIAXONE SODIUM 2000 MG: 2 INJECTION, POWDER, FOR SOLUTION INTRAMUSCULAR; INTRAVENOUS at 05:19

## 2025-01-22 RX ADMIN — DEXAMETHASONE SODIUM PHOSPHATE 6 MG: 10 INJECTION INTRAMUSCULAR; INTRAVENOUS at 02:06

## 2025-01-22 RX ADMIN — WATER 60 MG: 1 INJECTION INTRAMUSCULAR; INTRAVENOUS; SUBCUTANEOUS at 06:36

## 2025-01-22 RX ADMIN — IPRATROPIUM BROMIDE AND ALBUTEROL SULFATE 2 DOSE: .5; 2.5 SOLUTION RESPIRATORY (INHALATION) at 00:50

## 2025-01-22 RX ADMIN — OSELTAMIVIR PHOSPHATE 75 MG: 6 POWDER, FOR SUSPENSION ORAL at 20:40

## 2025-01-22 RX ADMIN — MAGNESIUM SULFATE HEPTAHYDRATE 2000 MG: 40 INJECTION, SOLUTION INTRAVENOUS at 02:06

## 2025-01-22 RX ADMIN — OSELTAMIVIR PHOSPHATE 75 MG: 75 CAPSULE ORAL at 02:51

## 2025-01-22 RX ADMIN — IPRATROPIUM BROMIDE AND ALBUTEROL SULFATE 1 DOSE: 2.5; .5 SOLUTION RESPIRATORY (INHALATION) at 08:12

## 2025-01-22 RX ADMIN — DIPHENHYDRAMINE HYDROCHLORIDE 25 MG: 50 INJECTION, SOLUTION INTRAMUSCULAR; INTRAVENOUS at 02:06

## 2025-01-22 RX ADMIN — BUDESONIDE AND FORMOTEROL FUMARATE DIHYDRATE 2 PUFF: 160; 4.5 AEROSOL RESPIRATORY (INHALATION) at 19:32

## 2025-01-22 RX ADMIN — AZITHROMYCIN MONOHYDRATE 500 MG: 500 INJECTION, POWDER, LYOPHILIZED, FOR SOLUTION INTRAVENOUS at 06:21

## 2025-01-22 RX ADMIN — ACETAMINOPHEN 650 MG: 650 SOLUTION ORAL at 17:09

## 2025-01-22 RX ADMIN — ONDANSETRON 8 MG: 2 INJECTION INTRAMUSCULAR; INTRAVENOUS at 02:51

## 2025-01-22 RX ADMIN — ACETAMINOPHEN 650 MG: 650 SOLUTION ORAL at 23:52

## 2025-01-22 RX ADMIN — IPRATROPIUM BROMIDE AND ALBUTEROL SULFATE 1 DOSE: 2.5; .5 SOLUTION RESPIRATORY (INHALATION) at 23:19

## 2025-01-22 RX ADMIN — CHLORHEXIDINE GLUCONATE 0.12% ORAL RINSE 15 ML: 1.2 LIQUID ORAL at 07:47

## 2025-01-22 ASSESSMENT — PULMONARY FUNCTION TESTS
PIF_VALUE: 33
PIF_VALUE: 35
PIF_VALUE: 37
PIF_VALUE: 34
PIF_VALUE: 32
PIF_VALUE: 36
PIF_VALUE: 31
PIF_VALUE: 42
PIF_VALUE: 31
PIF_VALUE: 35
PIF_VALUE: 33
PIF_VALUE: 37
PIF_VALUE: 35
PIF_VALUE: 30
PIF_VALUE: 36
PIF_VALUE: 32
PIF_VALUE: 31
PIF_VALUE: 31
PIF_VALUE: 37
PIF_VALUE: 28

## 2025-01-22 ASSESSMENT — PAIN - FUNCTIONAL ASSESSMENT: PAIN_FUNCTIONAL_ASSESSMENT: NONE - DENIES PAIN

## 2025-01-22 NOTE — ED TRIAGE NOTES
Pt is here from home for SOB, Pt in on 4-Lpm home oxygen, pt refused non-re-breather mask with EMS due to anxiety.

## 2025-01-22 NOTE — ED PROVIDER NOTES
CHIEF COMPLAINT    Chief Complaint   Patient presents with    Shortness of Breath     HPI  Eloina Roca is a 59 y.o. female with history of COPD with chronic respiratory failure and diastolic CHF who presents to the ED via EMS with complaints of shortness of breath.  Patient states over the last 2 days she has been experiencing worsening shortness of breath that became acutely worse this evening.  She has an associated cough which is nonproductive in nature and does not feel to be worse than baseline.  Despite wearing her 4 L nasal cannula oxygen at home she continued to feel short of breath.  She also tried nebulized breathing treatments at home without relief.  Currently rates her shortness of breath as severe and exacerbated by exertion.  Her shortness of breath is constant.  Medics state on their arrival patient was saturating in the low 80s and tripoding.  Patient denies fevers, chills, nausea, vomiting, dizziness, lightheadedness      REVIEW OF SYSTEMS  Constitutional: No fever, chills   Eye: No visual changes  HENT: No earache or sore throat.  Resp: Complains of shortness of breath and cough  Cardio: No chest pain or palpitations.  GI: No abdominal pain, nausea, vomiting, constipation or diarrhea. No melena.  : No dysuria, urgency or frequency.  Endocrine: No heat intolerance, no cold intolerance, no polydipsia   Lymphatics: No adenopathy  Musculoskeletal: No new muscle aches or joint pain.  Neuro: No headaches.  Psych: No homicidal or suicidal thoughts  Skin: No rash, No itching.  ?  ?  PAST MEDICAL HISTORY  Past Medical History:   Diagnosis Date    COPD (chronic obstructive pulmonary disease) (HCC)     Hx of Doppler ultrasound Carotid Duplex Study 02/09/2022    The Left Proximal internal carotid artery exhibits 0-49% stenosis. Normal vertebral flow.    Hx of echocardiogram 02/09/2022    EF is 55-60% no wall motion abnormalities. Right side of heart is enlarged. No evidence of pericardial effusion

## 2025-01-22 NOTE — DISCHARGE INSTR - DIET
Good nutrition is important when healing from an illness, injury, or surgery.  Follow any nutrition recommendations given to you during your hospital stay.   If you were given an oral nutrition supplement while in the hospital, continue to take this supplement at home.  You can take it with meals, in-between meals, and/or before bedtime. These supplements can be purchased at most local grocery stores, pharmacies, and chain super-stores.   If you have any questions about your diet or nutrition, call the hospital and ask for the dietitian.  Due to malnutrition diagnosis, after discharge, RD recommends patient to drink high calorie, high protein oral nutrition supplements of greater than 200 calories per serving with at least or greater than 10 gm protein per serving, at least 2-3 times per day, to promote increased po intakes and weight gain. Coupons and High Calorie, High Protein Nutrition Therapy handouts provided.     Suggestions to follow at home to improve appetite:   Aim for small, frequent eating sessions. (I.e. 5-8 times per day of smaller portions)   Schedule eating times (I.e. every 2-4 hours would have a snack)   Enhance the eating environment (I.e. Eating with family and friends, watching favorite movie, or listening to favorite music with meal)   Identify problem smells taste, textures, and temperatures  Choose foods that are easy to chew and swallow   Avoid drinking fluids during eating session. Save drinks and sips between meals.     There is no need to apply all of these strategies at once.     Many patients benefit from adding 1 or 2 suggestions at a time to see how they affect their ability to eat, and then making an informed decision on how to proceed.     Please read Nutrition handout below:     High Calorie, High Protein Nutrition Therapy from Nutrition Care Manual     A high-calorie, high-protein diet has been recommended to you. Your registered dietitian nutritionist (RDN) may have recommended

## 2025-01-22 NOTE — PLAN OF CARE
Problem: Discharge Planning  Goal: Discharge to home or other facility with appropriate resources  Outcome: Progressing     Problem: Safety - Adult  Goal: Free from fall injury  Outcome: Progressing     Problem: Safety - Medical Restraint  Goal: Remains free of injury from restraints (Restraint for Interference with Medical Device)  Description: INTERVENTIONS:  1. Determine that other, less restrictive measures have been tried or would not be effective before applying the restraint  2. Evaluate the patient's condition at the time of restraint application  3. Inform patient/family regarding the reason for restraint  4. Q2H: Monitor safety, psychosocial status, comfort, nutrition and hydration  Outcome: Progressing  Flowsheets  Taken 1/22/2025 0800 by Maine Méndez RN  Remains free of injury from restraints (restraint for interference with medical device): Every 2 hours: Monitor safety, psychosocial status, comfort, nutrition and hydration  Taken 1/22/2025 0722 by Christy Velez RN  Remains free of injury from restraints (restraint for interference with medical device): Determine that other, less restrictive measures have been tried or would not be effective before applying the restraint     Problem: Pain  Goal: Verbalizes/displays adequate comfort level or baseline comfort level  Outcome: Progressing     Problem: Skin/Tissue Integrity  Goal: Absence of new skin breakdown  Description: 1.  Monitor for areas of redness and/or skin breakdown  2.  Assess vascular access sites hourly  3.  Every 4-6 hours minimum:  Change oxygen saturation probe site  4.  Every 4-6 hours:  If on nasal continuous positive airway pressure, respiratory therapy assess nares and determine need for appliance change or resting period.  Outcome: Progressing

## 2025-01-22 NOTE — CONSULTS
V2.0  [facility] Consult Note      Name:  Eloina Roca /Age/Sex: 1965  (59 y.o. female)   MRN & CSN:  [id number] & [id number] Encounter Date/Time: 2025 11:11 AM EST   Location:  -A PCP: Leny Aguillon MD     Attending:Jayden Donahue Jr., *  Consulting Provider: Jamshid Chan MD      Hospital Day: 1    Assessment and Recommendations   Eloina Roca is a 59 y.o. female who presents with Acute respiratory failure    Hospital Problems             Last Modified POA    * (Principal) Acute respiratory failure 2025 Yes    Moderate malnutrition (HCC) (Chronic) 2025 Yes       Recommendations:     Acute hypoxic and hypercarbic respiratory failure  Influenza pneumonia, suspicion for superimposed bacterial pneumonia  -SAT SBT as per critical care.  -Has been started on Tamiflu and broad-spectrum antibiotics with ceftriaxone azithromycin.  Follow results of the culture and narrow as able.    COPD with exacerbation  -Likely secondary to above.  Started on IV corticosteroids    Chronic diastolic heart failure  -Monitor volume status closely.    CAD: Aspirin and statin  Hyperlipidemia        Diet Diet NPO  ADULT TUBE FEEDING; Nasogastric; Peptide Based; Continuous; 10; Yes; 10; Q 6 hours; 40; 50; Q 4 hours   DVT Prophylaxis [x] Lovenox, []  Heparin, [] SCDs, [] Ambulation,  [] Eliquis, [] Xarelto   Code Status Full Code   Surrogate Decision Maker/ Paola Duran (Child)        Personally reviewed Lab Studies and Imaging     Monitor while on high-dose corticosteroids with blood glucose checks    History From:    History obtained from chart review.     History of Present Illness:      Chief Complaint:   Chief Complaint   Patient presents with    Shortness of Breath         Eloina Roca is a 59 y.o. female who presents with shortness of breath progressive getting worse for the last 2 to 3 days.  In the ER she was intubated and now admitted to ICU for further management.      Review

## 2025-01-22 NOTE — CARE COORDINATION
01/22/25 0800   Service Assessment   Patient Orientation Sedated  (Vent)   History Provided By Medical Record  (No family present)   Primary Caregiver Self   Support Systems Children   Patient's Healthcare Decision Maker is: Legal Next of Kin   PCP Verified by CM Yes   Prior Functional Level Independent in ADLs/IADLs   Current Functional Level Assistance with the following:;Toileting;Mobility  (Hospital policy)   Can patient return to prior living arrangement Unknown at present   Ability to make needs known: Unable  (Vent)   Family able to assist with home care needs: Other (comment)  (Unsure- no family present)   Would you like for me to discuss the discharge plan with any other family members/significant others, and if so, who?   (Unable to respond)   Social/Functional History   Lives With Alone   Type of Home Apartment   Bathroom Equipment Shower chair   Active  No   Patient's  Info Family   Occupation On disability     Attempted assessment. No family present; patient is intubated/sedated. Per previous CM notes- patient lives alone; has good family support. She has a PCP and insurance that assists with Rx when needed. CM will revisit post extubation. Amira Gupta RN

## 2025-01-22 NOTE — H&P
History and Physical 25        NAME: Eloina Roca  : 1965  MRN: [id number]      Assessment/Plan:  Eloina Roca is a 59 y.o. female with a history of COPD HFpEF HLD who presented to [facility] 2025 with shortness of breath that has progressively gotten worse over the last 2 days found to be in respiratory failure requring mechanical ventilatory support. Admitted to the ICU for further management       Problem list  Acute hypoxic and hypercarbic respiratory failure  COPD  Influenza pneumonia  CHF HFpEF  HLD  CAD    Neuro: Intubated, sedated titrate to RASS goal 0 to -1. Prior to intaubtion no noted focal deficits per ED. Pain control with multimodal regimen adjust as needed  Cardio:  Normotensive at this time, low threshold for initiation of vasoactive agents, titrate to MAP > 65 mmHg. Known to have CHF HfpEF with diastolic dysfunction and potential right heart failure from COPD. Repeat echo pending  Resp: Acute hypoxic and hypercarbic respiratory failure with Influenza pneumonia and superimposed COPD exacerbation requring mechanical ventilatory support. Adjust vent to optimize acid base status. Continue inhalers, nebs and aggressive pulm toileting. SAT/SBT with intent to extubate when clinically appropriate  GI: NPO, NGT, TF, GI ppx  : Cr 0.7, monitor uop, monitor electrolytes and replenish as needed  Heme:  Hgb and plts, pending. DVT ppx: Lovenox  ID: CBC pending. Noted to be positive for influenza. Intuabted for COPD exacerbation, started on oseltamivir, Azithromycin and ceftriaxone for COPD exacerabation. F/u cultures and sensitivities and de-escalate as able  Endo: POC BG ISS PRN. Maintain euglycemia.   MSK: DTI prevention, PT/OT/OOBTC when clinically appropriate    Tubes/Lines/Drains:  ETT, NGT, PIV    Code Status: Full         Chief Complaint:    Shortness of breath    History of Present Illness:    Eloina Roca is a 59 y.o. female with a history of COPD on home oxygen, HFpEF HLD

## 2025-01-22 NOTE — CONSULTS
Comprehensive Nutrition Assessment    Type and Reason for Visit:  Initial, Consult, Nutrition support (new vent TF order/manage)    Nutrition Recommendations/Plan:   Start TF- Vital AF 1.2 (peptide based formula) @ 40mL/hr continuous w/ 50mL FWF Q4H to provide 1152kcal (+92kcal from propofol), 72g PRO, and 1100mL fluids per day   Advance TF rate slowly, as pt at risk for refeeding syndrome   Monitor hemodynamic status, GI status, weights, labs, POC     Malnutrition Assessment:  Malnutrition Status:  Moderate malnutrition (01/22/25 0929)    Context:  Chronic Illness     Findings of the 6 clinical characteristics of malnutrition:  Energy Intake:  Unable to assess  Weight Loss:  Mild weight loss     Body Fat Loss:  Mild body fat loss Orbital, Triceps   Muscle Mass Loss:   (moderate) Temples (temporalis), Clavicles (pectoralis & deltoids), Thigh (quadriceps), Calf (gastrocnemius)  Fluid Accumulation:  No fluid accumulation     Strength:  Not Performed    Nutrition Assessment:    Admitted w/ acute respiratory failure, history of COPD HFpEF HLD. Currently intubated, sedated, not on pressors, MAP 77. TF ordered but not yet started. Mild wt loss noted over the past 8mo per chart review. Pt continues w/ mild-moderate wasting, consistent w/ prior admit. Advance TF slowly d/t risk of refeeding syndrome. Follow at high risk.    Nutrition Related Findings:    +propofol, fentanyl gtts. labs reviewed, annamarietes WNL. Wound Type: None       Current Nutrition Intake & Therapies:    Average Meal Intake: NPO  Average Supplements Intake: NPO  Diet NPO  ADULT TUBE FEEDING; Nasogastric; Peptide Based High Protein; Continuous; 10; Yes; 10; Q 6 hours; 65; 30; Q 1 hour; Protein; 1 Dose; TID  Additional Calorie Sources:  92kcal from propofol @ 3.5ml/hr    Anthropometric Measures:  Height: 170.2 cm (5' 7\")  Ideal Body Weight (IBW): 135 lbs (61 kg)    Admission Body Weight: 52.3 kg (115 lb 4.8 oz)  Current Body Weight: 52.3 kg (115 lb 4.8 oz),

## 2025-01-23 LAB
ACINETOBACTER CALCOACETICUS-BAUMANNII BY PCR: NOT DETECTED
ADENOVIRUS: NOT DETECTED
ALBUMIN SERPL-MCNC: 3.4 G/DL (ref 3.4–5)
ALBUMIN/GLOB SERPL: 1.4 {RATIO} (ref 1.1–2.2)
ALP SERPL-CCNC: 92 U/L (ref 40–129)
ALT SERPL-CCNC: 41 U/L (ref 10–40)
ANION GAP SERPL CALCULATED.3IONS-SCNC: 12 MMOL/L (ref 9–17)
ARTERIAL PATENCY WRIST A: ABNORMAL
AST SERPL-CCNC: 80 U/L (ref 15–37)
BASOPHILS # BLD: 0 K/UL
BASOPHILS NFR BLD: 0 % (ref 0–1)
BILIRUB DIRECT SERPL-MCNC: <0.2 MG/DL (ref 0–0.3)
BILIRUB INDIRECT SERPL-MCNC: ABNORMAL MG/DL (ref 0–0.7)
BILIRUB SERPL-MCNC: <0.2 MG/DL (ref 0–1)
BODY TEMPERATURE: 37
BUN SERPL-MCNC: 48 MG/DL (ref 7–20)
CA-I BLD-SCNC: 1.2 MMOL/L (ref 1.15–1.33)
CALCIUM SERPL-MCNC: 8.2 MG/DL (ref 8.3–10.6)
CHLAMYDIA PNEUMONIAE BY PCR: NOT DETECTED
CHLORIDE SERPL-SCNC: 101 MMOL/L (ref 99–110)
CHOLEST SERPL-MCNC: 195 MG/DL (ref 125–199)
CO2 SERPL-SCNC: 24 MMOL/L (ref 21–32)
COHGB MFR BLD: 0.3 % (ref 0.5–1.5)
COHGB MFR BLD: 0.3 % (ref 0.5–1.5)
COHGB MFR BLD: 0.4 % (ref 0.5–1.5)
CORONAVIRUS PCR: NOT DETECTED
CREAT SERPL-MCNC: 1.2 MG/DL (ref 0.6–1.2)
EKG ATRIAL RATE: 102 BPM
EKG DIAGNOSIS: NORMAL
EKG P AXIS: 38 DEGREES
EKG P-R INTERVAL: 98 MS
EKG Q-T INTERVAL: 332 MS
EKG QRS DURATION: 86 MS
EKG QTC CALCULATION (BAZETT): 432 MS
EKG R AXIS: 90 DEGREES
EKG T AXIS: 77 DEGREES
EKG VENTRICULAR RATE: 102 BPM
ENTEROBACTER CLOACAE COMPLEX BY PCR: NOT DETECTED
EOSINOPHIL # BLD: 0 K/UL
EOSINOPHILS RELATIVE PERCENT: 0 % (ref 0–3)
ERYTHROCYTE [DISTWIDTH] IN BLOOD BY AUTOMATED COUNT: 12.4 % (ref 11.7–14.9)
ESCHERICHIA COLI BY PCR: NOT DETECTED
EST. AVERAGE GLUCOSE BLD GHB EST-MCNC: 123 MG/DL
GFR, ESTIMATED: 46 ML/MIN/1.73M2
GLUCOSE BLD-MCNC: 173 MG/DL (ref 74–99)
GLUCOSE SERPL-MCNC: 257 MG/DL (ref 74–99)
GP B STREP DNA SPT NAA+NON-PRB-NCNCRNG: NOT DETECTED
HAEMOPHILUS INFLUENZAE BY PCR: ABNORMAL
HBA1C MFR BLD: 5.9 % (ref 4.2–6.3)
HCO3 VENOUS: 25.9 MMOL/L (ref 22–29)
HCO3 VENOUS: 28 MMOL/L (ref 22–29)
HCO3 VENOUS: 30.4 MMOL/L (ref 22–29)
HCT VFR BLD AUTO: 38.2 % (ref 37–47)
HDLC SERPL-MCNC: 77 MG/DL
HGB BLD-MCNC: 11.7 G/DL (ref 12.5–16)
HUMAN RHINOVIRUS/ENTEROVIRUS BY PCR: NOT DETECTED
IMM GRANULOCYTES # BLD AUTO: 0.01 K/UL
IMM GRANULOCYTES NFR BLD: 0 %
INFLUENZA A BY PCR: DETECTED
INFLUENZA B BY PCR: NOT DETECTED
INR PPP: 1
K AEROGENES DNA BAL NAA+NON-PRB-NCNCRNG: NOT DETECTED
K OXYTOCA DNA SPT NAA+NON-PRB-NCNCRNG: NOT DETECTED
K PNEU GRP DNA SPT NAA+NON-PRB-NCNCRNG: NOT DETECTED
LDLC SERPL CALC-MCNC: 100 MG/DL
LEGIONELLA PNEUMOPHILIA BY PCR: NOT DETECTED
LYMPHOCYTES NFR BLD: 0.21 K/UL
LYMPHOCYTES RELATIVE PERCENT: 4 % (ref 24–44)
M CATARRHALIS DNA BAL NAA+NON-PRB-NCNCRNG: NOT DETECTED
MAGNESIUM SERPL-MCNC: 2.4 MG/DL (ref 1.8–2.4)
MCH RBC QN AUTO: 29.5 PG (ref 27–31)
MCHC RBC AUTO-ENTMCNC: 30.6 G/DL (ref 32–36)
MCV RBC AUTO: 96.2 FL (ref 78–100)
METAPNEUMOVIRUS BY PCR: NOT DETECTED
METHEMOGLOBIN: 0.4 % (ref 0.5–1.5)
METHEMOGLOBIN: 0.4 % (ref 0.5–1.5)
METHEMOGLOBIN: 0.5 % (ref 0.5–1.5)
MONOCYTES NFR BLD: 0.14 K/UL
MONOCYTES NFR BLD: 3 % (ref 0–4)
MYCOPLASMA PNEUMONIAE PCR: NOT DETECTED
NEGATIVE BASE EXCESS, VEN: 0.2 MMOL/L (ref 0–3)
NEGATIVE BASE EXCESS, VEN: 2.6 MMOL/L (ref 0–3)
NEUTROPHILS NFR BLD: 93 % (ref 36–66)
NEUTS SEG NFR BLD: 4.85 K/UL
OXYHGB MFR BLD: 83.5 %
OXYHGB MFR BLD: 90.3 %
OXYHGB MFR BLD: 90.7 %
P AERUGINOSA DNA SPT NAA+NON-PRB-NCNCRNG: NOT DETECTED
PARAINFLUENZA VIRUS BY PCR: NOT DETECTED
PCO2 VENOUS: 62 MM HG (ref 38–54)
PCO2 VENOUS: 62 MM HG (ref 38–54)
PCO2 VENOUS: 77.1 MM HG (ref 38–54)
PH VENOUS: 7.21 (ref 7.32–7.43)
PH VENOUS: 7.24 (ref 7.32–7.43)
PH VENOUS: 7.27 (ref 7.32–7.43)
PHOSPHATE SERPL-MCNC: 5.7 MG/DL (ref 2.5–4.9)
PLATELET # BLD AUTO: 117 K/UL (ref 140–440)
PMV BLD AUTO: 12.4 FL (ref 7.5–11.1)
PO2 VENOUS: 52.9 MM HG (ref 23–48)
PO2 VENOUS: 67.5 MM HG (ref 23–48)
PO2 VENOUS: 72.4 MM HG (ref 23–48)
POSITIVE BASE EXCESS, VEN: 0.5 MMOL/L (ref 0–3)
POTASSIUM SERPL-SCNC: 4.6 MMOL/L (ref 3.5–5.1)
PROCALCITONIN SERPL-MCNC: 0.35 NG/ML
PROT SERPL-MCNC: 5.8 G/DL (ref 6.4–8.2)
PROTEUS SP DNA BAL NAA+NON-PRB-NCNCRNG: NOT DETECTED
PROTHROMBIN TIME: 13.3 SEC (ref 11.7–14.5)
RBC # BLD AUTO: 3.97 M/UL (ref 4.2–5.4)
RSV BY PCR: NOT DETECTED
S AUREUS DNA SPT NAA+NON-PRB-NCNCRNG: NOT DETECTED
S MARCESCENS DNA SPT NAA+NON-PRB-NCNCRNG: NOT DETECTED
S PNEUM DNA BAL NAA+NON-PRB-NCNCRNG: NOT DETECTED
S PYO DNA SPT NAA+NON-PRB-NCNCRNG: NOT DETECTED
SODIUM SERPL-SCNC: 137 MMOL/L (ref 136–145)
SOURCE: ABNORMAL
T4 SERPL-MCNC: 5.7 UG/DL (ref 4.5–10.9)
TEXT FOR RESPIRATORY: ABNORMAL
TRIGL SERPL-MCNC: 90 MG/DL
TSH SERPL DL<=0.05 MIU/L-ACNC: 0.69 UIU/ML (ref 0.27–4.2)
WBC OTHER # BLD: 5.2 K/UL (ref 4–10.5)

## 2025-01-23 PROCEDURE — 85025 COMPLETE CBC W/AUTO DIFF WBC: CPT

## 2025-01-23 PROCEDURE — 2700000000 HC OXYGEN THERAPY PER DAY

## 2025-01-23 PROCEDURE — 94799 UNLISTED PULMONARY SVC/PX: CPT

## 2025-01-23 PROCEDURE — 84100 ASSAY OF PHOSPHORUS: CPT

## 2025-01-23 PROCEDURE — 84436 ASSAY OF TOTAL THYROXINE: CPT

## 2025-01-23 PROCEDURE — 2500000003 HC RX 250 WO HCPCS: Performed by: STUDENT IN AN ORGANIZED HEALTH CARE EDUCATION/TRAINING PROGRAM

## 2025-01-23 PROCEDURE — 2580000003 HC RX 258: Performed by: STUDENT IN AN ORGANIZED HEALTH CARE EDUCATION/TRAINING PROGRAM

## 2025-01-23 PROCEDURE — 6370000000 HC RX 637 (ALT 250 FOR IP): Performed by: STUDENT IN AN ORGANIZED HEALTH CARE EDUCATION/TRAINING PROGRAM

## 2025-01-23 PROCEDURE — 2500000003 HC RX 250 WO HCPCS: Performed by: NURSE PRACTITIONER

## 2025-01-23 PROCEDURE — 2000000000 HC ICU R&B

## 2025-01-23 PROCEDURE — 83036 HEMOGLOBIN GLYCOSYLATED A1C: CPT

## 2025-01-23 PROCEDURE — 85610 PROTHROMBIN TIME: CPT

## 2025-01-23 PROCEDURE — 84443 ASSAY THYROID STIM HORMONE: CPT

## 2025-01-23 PROCEDURE — 84145 PROCALCITONIN (PCT): CPT

## 2025-01-23 PROCEDURE — 6360000002 HC RX W HCPCS: Performed by: STUDENT IN AN ORGANIZED HEALTH CARE EDUCATION/TRAINING PROGRAM

## 2025-01-23 PROCEDURE — 94640 AIRWAY INHALATION TREATMENT: CPT

## 2025-01-23 PROCEDURE — 94660 CPAP INITIATION&MGMT: CPT

## 2025-01-23 PROCEDURE — 94003 VENT MGMT INPAT SUBQ DAY: CPT

## 2025-01-23 PROCEDURE — 82962 GLUCOSE BLOOD TEST: CPT

## 2025-01-23 PROCEDURE — 80061 LIPID PANEL: CPT

## 2025-01-23 PROCEDURE — 6370000000 HC RX 637 (ALT 250 FOR IP): Performed by: SPECIALIST

## 2025-01-23 PROCEDURE — 89220 SPUTUM SPECIMEN COLLECTION: CPT

## 2025-01-23 PROCEDURE — 82248 BILIRUBIN DIRECT: CPT

## 2025-01-23 PROCEDURE — 36415 COLL VENOUS BLD VENIPUNCTURE: CPT

## 2025-01-23 PROCEDURE — 83735 ASSAY OF MAGNESIUM: CPT

## 2025-01-23 PROCEDURE — 93010 ELECTROCARDIOGRAM REPORT: CPT | Performed by: INTERNAL MEDICINE

## 2025-01-23 PROCEDURE — 82805 BLOOD GASES W/O2 SATURATION: CPT

## 2025-01-23 PROCEDURE — 80053 COMPREHEN METABOLIC PANEL: CPT

## 2025-01-23 PROCEDURE — 94761 N-INVAS EAR/PLS OXIMETRY MLT: CPT

## 2025-01-23 PROCEDURE — 5A1945Z RESPIRATORY VENTILATION, 24-96 CONSECUTIVE HOURS: ICD-10-PCS | Performed by: STUDENT IN AN ORGANIZED HEALTH CARE EDUCATION/TRAINING PROGRAM

## 2025-01-23 PROCEDURE — 82330 ASSAY OF CALCIUM: CPT

## 2025-01-23 RX ORDER — OSELTAMIVIR PHOSPHATE 6 MG/ML
30 FOR SUSPENSION ORAL 2 TIMES DAILY
Status: DISCONTINUED | OUTPATIENT
Start: 2025-01-23 | End: 2025-01-24

## 2025-01-23 RX ORDER — DEXMEDETOMIDINE HYDROCHLORIDE 4 UG/ML
.1-.8 INJECTION, SOLUTION INTRAVENOUS CONTINUOUS
Status: DISCONTINUED | OUTPATIENT
Start: 2025-01-23 | End: 2025-01-25

## 2025-01-23 RX ORDER — NOREPINEPHRINE BITARTRATE 0.06 MG/ML
1-100 INJECTION, SOLUTION INTRAVENOUS CONTINUOUS
Status: DISCONTINUED | OUTPATIENT
Start: 2025-01-23 | End: 2025-01-27

## 2025-01-23 RX ORDER — PREDNISONE 20 MG/1
40 TABLET ORAL DAILY
Status: DISCONTINUED | OUTPATIENT
Start: 2025-01-24 | End: 2025-01-25

## 2025-01-23 RX ORDER — FENTANYL CITRATE-0.9 % NACL/PF 10 MCG/ML
25-200 PLASTIC BAG, INJECTION (ML) INTRAVENOUS CONTINUOUS
Status: DISCONTINUED | OUTPATIENT
Start: 2025-01-23 | End: 2025-01-23

## 2025-01-23 RX ADMIN — ATORVASTATIN CALCIUM 40 MG: 40 TABLET, FILM COATED ORAL at 21:20

## 2025-01-23 RX ADMIN — ACETAMINOPHEN 650 MG: 650 SOLUTION ORAL at 05:45

## 2025-01-23 RX ADMIN — ACETAMINOPHEN 650 MG: 650 SOLUTION ORAL at 22:31

## 2025-01-23 RX ADMIN — ASPIRIN 81 MG CHEWABLE TABLET 81 MG: 81 TABLET CHEWABLE at 08:48

## 2025-01-23 RX ADMIN — CEFTRIAXONE SODIUM 2000 MG: 2 INJECTION, POWDER, FOR SOLUTION INTRAMUSCULAR; INTRAVENOUS at 04:53

## 2025-01-23 RX ADMIN — CARBOXYMETHYLCELLULOSE SODIUM 1 DROP: 10 GEL OPHTHALMIC at 05:45

## 2025-01-23 RX ADMIN — CARBOXYMETHYLCELLULOSE SODIUM 1 DROP: 10 GEL OPHTHALMIC at 03:17

## 2025-01-23 RX ADMIN — Medication 50 MCG/HR: at 03:27

## 2025-01-23 RX ADMIN — Medication 15 ML: at 08:48

## 2025-01-23 RX ADMIN — WATER 60 MG: 1 INJECTION INTRAMUSCULAR; INTRAVENOUS; SUBCUTANEOUS at 03:54

## 2025-01-23 RX ADMIN — BUDESONIDE AND FORMOTEROL FUMARATE DIHYDRATE 2 PUFF: 160; 4.5 AEROSOL RESPIRATORY (INHALATION) at 21:03

## 2025-01-23 RX ADMIN — OSELTAMIVIR PHOSPHATE 75 MG: 6 POWDER, FOR SUSPENSION ORAL at 08:47

## 2025-01-23 RX ADMIN — Medication 100 MG: at 08:47

## 2025-01-23 RX ADMIN — ACETAMINOPHEN 650 MG: 650 SOLUTION ORAL at 12:49

## 2025-01-23 RX ADMIN — OSELTAMIVIR PHOSPHATE 30 MG: 6 POWDER, FOR SUSPENSION ORAL at 22:31

## 2025-01-23 RX ADMIN — FOLIC ACID 1 MG: 1 TABLET ORAL at 08:48

## 2025-01-23 RX ADMIN — DEXMEDETOMIDINE HYDROCHLORIDE 1.4 MCG/KG/HR: 4 INJECTION, SOLUTION INTRAVENOUS at 03:47

## 2025-01-23 RX ADMIN — NOREPINEPHRINE BITARTRATE 5 MCG/MIN: 0.06 INJECTION, SOLUTION INTRAVENOUS at 06:57

## 2025-01-23 RX ADMIN — SODIUM CHLORIDE, PRESERVATIVE FREE 10 ML: 5 INJECTION INTRAVENOUS at 21:20

## 2025-01-23 RX ADMIN — SODIUM CHLORIDE, PRESERVATIVE FREE 10 ML: 5 INJECTION INTRAVENOUS at 10:33

## 2025-01-23 RX ADMIN — CHLORHEXIDINE GLUCONATE 0.12% ORAL RINSE 15 ML: 1.2 LIQUID ORAL at 08:48

## 2025-01-23 RX ADMIN — IPRATROPIUM BROMIDE AND ALBUTEROL SULFATE 1 DOSE: 2.5; .5 SOLUTION RESPIRATORY (INHALATION) at 21:04

## 2025-01-23 RX ADMIN — WATER 60 MG: 1 INJECTION INTRAMUSCULAR; INTRAVENOUS; SUBCUTANEOUS at 10:17

## 2025-01-23 RX ADMIN — IPRATROPIUM BROMIDE AND ALBUTEROL SULFATE 1 DOSE: 2.5; .5 SOLUTION RESPIRATORY (INHALATION) at 03:23

## 2025-01-23 RX ADMIN — ENOXAPARIN SODIUM 40 MG: 100 INJECTION SUBCUTANEOUS at 08:48

## 2025-01-23 RX ADMIN — IPRATROPIUM BROMIDE AND ALBUTEROL SULFATE 1 DOSE: 2.5; .5 SOLUTION RESPIRATORY (INHALATION) at 08:04

## 2025-01-23 RX ADMIN — ACETAMINOPHEN 650 MG: 650 SOLUTION ORAL at 18:05

## 2025-01-23 RX ADMIN — ZINC SULFATE 220 MG (50 MG) CAPSULE 50 MG: CAPSULE at 08:47

## 2025-01-23 RX ADMIN — BUDESONIDE AND FORMOTEROL FUMARATE DIHYDRATE 2 PUFF: 160; 4.5 AEROSOL RESPIRATORY (INHALATION) at 08:05

## 2025-01-23 RX ADMIN — AZITHROMYCIN MONOHYDRATE 500 MG: 500 INJECTION, POWDER, LYOPHILIZED, FOR SOLUTION INTRAVENOUS at 03:52

## 2025-01-23 RX ADMIN — DEXMEDETOMIDINE HYDROCHLORIDE 0.2 MCG/KG/HR: 4 INJECTION, SOLUTION INTRAVENOUS at 18:19

## 2025-01-23 ASSESSMENT — PULMONARY FUNCTION TESTS
PIF_VALUE: 34
PIF_VALUE: 45
PIF_VALUE: 34
PIF_VALUE: 29
PIF_VALUE: 28
PIF_VALUE: 34
PIF_VALUE: 28
PIF_VALUE: 23
PIF_VALUE: 29
PIF_VALUE: 36
PIF_VALUE: 39
PIF_VALUE: 26
PIF_VALUE: 16
PIF_VALUE: 44
PIF_VALUE: 16
PIF_VALUE: 25

## 2025-01-23 NOTE — DISCHARGE INSTR - COC
Continuity of Care Form    Patient Name: Eloina Roca   :  1965  MRN:  [id number]    Admit date:  2025  Discharge date:  ***    Code Status Order: Full Code   Advance Directives:   Advance Care Flowsheet Documentation             Admitting Physician:  Ana Vera MD  PCP: Leny Aguillon MD    Discharging Nurse: ***  Discharging Hospital Unit/Room#: 2122/2122-A  Discharging Unit Phone Number: ***    Emergency Contact:   Extended Emergency Contact Information  Primary Emergency Contact: Paola Fairchild  Home Phone: [phone]  Relation: Child  Secondary Emergency Contact: Chapin Elizabeth           [address]  Home Phone: [phone]  Relation: Child    Past Surgical History:  History reviewed. No pertinent surgical history.    Immunization History:   Immunization History   Administered Date(s) Administered    COVID-19, J&J, (age 18y+), IM, 0.5 mL 2021       Active Problems:  Patient Active Problem List   Diagnosis Code    Centrilobular emphysema ([facility]) J43.2    Occlusion of right carotid artery I65.21    Pneumonia J18.9    Severe malnutrition ([facility]) E43    Chronic right-sided heart failure ([facility]) I50.812    COPD (chronic obstructive pulmonary disease) ([facility]) J44.9    Cigarette smoker F17.210    SOBOE (shortness of breath on exertion) R06.02    Right upper lobe pulmonary nodule R91.1    Sleep related hypoxia G47.34    COPD exacerbation ([facility]) J44.1    Right lower lobe pulmonary nodule R91.1    Hypoxia R09.02    Chronic heart failure with preserved ejection fraction ([facility]) I50.32    Acute respiratory failure with hypoxia J96.01    Moderate malnutrition ([facility]) E44.0    Acute respiratory failure J96.00       Isolation/Infection:   Isolation            Droplet          Patient Infection Status       Infection Onset Added Last Indicated Last Indicated By Review Planned Expiration Resolved Resolved By    Influenza 25 Pneumonia Panel, Molecular

## 2025-01-24 LAB
ALBUMIN SERPL-MCNC: 3.6 G/DL (ref 3.4–5)
ALBUMIN/GLOB SERPL: 1.6 {RATIO} (ref 1.1–2.2)
ALP SERPL-CCNC: 85 U/L (ref 40–129)
ALT SERPL-CCNC: 51 U/L (ref 10–40)
ANION GAP SERPL CALCULATED.3IONS-SCNC: 6 MMOL/L (ref 9–17)
ARTERIAL PATENCY WRIST A: ABNORMAL
AST SERPL-CCNC: 114 U/L (ref 15–37)
BASOPHILS # BLD: 0.01 K/UL
BASOPHILS NFR BLD: 0 % (ref 0–1)
BILIRUB DIRECT SERPL-MCNC: <0.2 MG/DL (ref 0–0.3)
BILIRUB INDIRECT SERPL-MCNC: ABNORMAL MG/DL (ref 0–0.7)
BILIRUB SERPL-MCNC: <0.2 MG/DL (ref 0–1)
BODY TEMPERATURE: 37
BUN SERPL-MCNC: 34 MG/DL (ref 7–20)
CA-I BLD-SCNC: 1.25 MMOL/L (ref 1.15–1.33)
CALCIUM SERPL-MCNC: 8.7 MG/DL (ref 8.3–10.6)
CHLORIDE SERPL-SCNC: 104 MMOL/L (ref 99–110)
CO2 SERPL-SCNC: 30 MMOL/L (ref 21–32)
COHGB MFR BLD: 0.3 % (ref 0.5–1.5)
CREAT SERPL-MCNC: 0.6 MG/DL (ref 0.6–1.2)
EOSINOPHIL # BLD: 0 K/UL
EOSINOPHILS RELATIVE PERCENT: 0 % (ref 0–3)
ERYTHROCYTE [DISTWIDTH] IN BLOOD BY AUTOMATED COUNT: 12.6 % (ref 11.7–14.9)
GFR, ESTIMATED: >90 ML/MIN/1.73M2
GLUCOSE BLD-MCNC: 102 MG/DL (ref 74–99)
GLUCOSE SERPL-MCNC: 109 MG/DL (ref 74–99)
HCO3 VENOUS: 29.5 MMOL/L (ref 22–29)
HCT VFR BLD AUTO: 40.5 % (ref 37–47)
HGB BLD-MCNC: 12.3 G/DL (ref 12.5–16)
IMM GRANULOCYTES # BLD AUTO: 0.01 K/UL
IMM GRANULOCYTES NFR BLD: 0 %
INR PPP: 0.9
LYMPHOCYTES NFR BLD: 0.39 K/UL
LYMPHOCYTES RELATIVE PERCENT: 6 % (ref 24–44)
MAGNESIUM SERPL-MCNC: 2.6 MG/DL (ref 1.8–2.4)
MCH RBC QN AUTO: 29.6 PG (ref 27–31)
MCHC RBC AUTO-ENTMCNC: 30.4 G/DL (ref 32–36)
MCV RBC AUTO: 97.6 FL (ref 78–100)
METHEMOGLOBIN: 0.4 % (ref 0.5–1.5)
MICROORGANISM SPEC CULT: ABNORMAL
MICROORGANISM/AGENT SPEC: ABNORMAL
MONOCYTES NFR BLD: 0.82 K/UL
MONOCYTES NFR BLD: 13 % (ref 0–4)
NEUTROPHILS NFR BLD: 80 % (ref 36–66)
NEUTS SEG NFR BLD: 4.99 K/UL
OXYHGB MFR BLD: 90.3 %
PCO2 VENOUS: 72 MM HG (ref 38–54)
PH VENOUS: 7.23 (ref 7.32–7.43)
PHOSPHATE SERPL-MCNC: 3.2 MG/DL (ref 2.5–4.9)
PLATELET # BLD AUTO: 146 K/UL (ref 140–440)
PMV BLD AUTO: 12.1 FL (ref 7.5–11.1)
PO2 VENOUS: 65 MM HG (ref 23–48)
POSITIVE BASE EXCESS, VEN: 0.2 MMOL/L (ref 0–3)
POTASSIUM SERPL-SCNC: 5.5 MMOL/L (ref 3.5–5.1)
PROT SERPL-MCNC: 5.8 G/DL (ref 6.4–8.2)
PROTHROMBIN TIME: 12 SEC (ref 11.7–14.5)
RBC # BLD AUTO: 4.15 M/UL (ref 4.2–5.4)
SODIUM SERPL-SCNC: 141 MMOL/L (ref 136–145)
SPECIMEN DESCRIPTION: ABNORMAL
TEXT FOR RESPIRATORY: ABNORMAL
WBC OTHER # BLD: 6.2 K/UL (ref 4–10.5)

## 2025-01-24 PROCEDURE — 2580000003 HC RX 258: Performed by: STUDENT IN AN ORGANIZED HEALTH CARE EDUCATION/TRAINING PROGRAM

## 2025-01-24 PROCEDURE — 6370000000 HC RX 637 (ALT 250 FOR IP): Performed by: STUDENT IN AN ORGANIZED HEALTH CARE EDUCATION/TRAINING PROGRAM

## 2025-01-24 PROCEDURE — 6370000000 HC RX 637 (ALT 250 FOR IP): Performed by: NURSE PRACTITIONER

## 2025-01-24 PROCEDURE — 80053 COMPREHEN METABOLIC PANEL: CPT

## 2025-01-24 PROCEDURE — 36415 COLL VENOUS BLD VENIPUNCTURE: CPT

## 2025-01-24 PROCEDURE — 82962 GLUCOSE BLOOD TEST: CPT

## 2025-01-24 PROCEDURE — 85610 PROTHROMBIN TIME: CPT

## 2025-01-24 PROCEDURE — 6360000002 HC RX W HCPCS: Performed by: STUDENT IN AN ORGANIZED HEALTH CARE EDUCATION/TRAINING PROGRAM

## 2025-01-24 PROCEDURE — 94664 DEMO&/EVAL PT USE INHALER: CPT

## 2025-01-24 PROCEDURE — 83735 ASSAY OF MAGNESIUM: CPT

## 2025-01-24 PROCEDURE — 82330 ASSAY OF CALCIUM: CPT

## 2025-01-24 PROCEDURE — 94640 AIRWAY INHALATION TREATMENT: CPT

## 2025-01-24 PROCEDURE — 6370000000 HC RX 637 (ALT 250 FOR IP): Performed by: SPECIALIST

## 2025-01-24 PROCEDURE — 85025 COMPLETE CBC W/AUTO DIFF WBC: CPT

## 2025-01-24 PROCEDURE — 94660 CPAP INITIATION&MGMT: CPT

## 2025-01-24 PROCEDURE — 84100 ASSAY OF PHOSPHORUS: CPT

## 2025-01-24 PROCEDURE — 2700000000 HC OXYGEN THERAPY PER DAY

## 2025-01-24 PROCEDURE — 82805 BLOOD GASES W/O2 SATURATION: CPT

## 2025-01-24 PROCEDURE — 94761 N-INVAS EAR/PLS OXIMETRY MLT: CPT

## 2025-01-24 PROCEDURE — 82248 BILIRUBIN DIRECT: CPT

## 2025-01-24 PROCEDURE — 2500000003 HC RX 250 WO HCPCS: Performed by: STUDENT IN AN ORGANIZED HEALTH CARE EDUCATION/TRAINING PROGRAM

## 2025-01-24 PROCEDURE — 2500000003 HC RX 250 WO HCPCS: Performed by: NURSE PRACTITIONER

## 2025-01-24 PROCEDURE — 2000000000 HC ICU R&B

## 2025-01-24 RX ORDER — MUPIROCIN 20 MG/G
OINTMENT TOPICAL 2 TIMES DAILY
Status: DISCONTINUED | OUTPATIENT
Start: 2025-01-24 | End: 2025-01-27 | Stop reason: HOSPADM

## 2025-01-24 RX ORDER — CHLORHEXIDINE GLUCONATE 40 MG/ML
SOLUTION TOPICAL DAILY
Status: DISCONTINUED | OUTPATIENT
Start: 2025-01-25 | End: 2025-01-27 | Stop reason: HOSPADM

## 2025-01-24 RX ORDER — OSELTAMIVIR PHOSPHATE 75 MG/1
75 CAPSULE ORAL 2 TIMES DAILY
Status: COMPLETED | OUTPATIENT
Start: 2025-01-24 | End: 2025-01-26

## 2025-01-24 RX ORDER — DIAZEPAM 5 MG/1
5 TABLET ORAL EVERY 6 HOURS PRN
Status: DISCONTINUED | OUTPATIENT
Start: 2025-01-24 | End: 2025-01-27 | Stop reason: HOSPADM

## 2025-01-24 RX ORDER — MIDODRINE HYDROCHLORIDE 5 MG/1
5 TABLET ORAL
Status: DISCONTINUED | OUTPATIENT
Start: 2025-01-24 | End: 2025-01-27 | Stop reason: HOSPADM

## 2025-01-24 RX ADMIN — Medication 15 ML: at 08:41

## 2025-01-24 RX ADMIN — IPRATROPIUM BROMIDE AND ALBUTEROL SULFATE 1 DOSE: 2.5; .5 SOLUTION RESPIRATORY (INHALATION) at 08:05

## 2025-01-24 RX ADMIN — ATORVASTATIN CALCIUM 40 MG: 40 TABLET, FILM COATED ORAL at 21:09

## 2025-01-24 RX ADMIN — ASPIRIN 81 MG CHEWABLE TABLET 81 MG: 81 TABLET CHEWABLE at 08:39

## 2025-01-24 RX ADMIN — BUDESONIDE AND FORMOTEROL FUMARATE DIHYDRATE 2 PUFF: 160; 4.5 AEROSOL RESPIRATORY (INHALATION) at 08:13

## 2025-01-24 RX ADMIN — IPRATROPIUM BROMIDE AND ALBUTEROL SULFATE 1 DOSE: 2.5; .5 SOLUTION RESPIRATORY (INHALATION) at 15:58

## 2025-01-24 RX ADMIN — FOLIC ACID 1 MG: 1 TABLET ORAL at 08:42

## 2025-01-24 RX ADMIN — DEXMEDETOMIDINE HYDROCHLORIDE 0.6 MCG/KG/HR: 4 INJECTION, SOLUTION INTRAVENOUS at 09:02

## 2025-01-24 RX ADMIN — IPRATROPIUM BROMIDE AND ALBUTEROL SULFATE 1 DOSE: 2.5; .5 SOLUTION RESPIRATORY (INHALATION) at 10:51

## 2025-01-24 RX ADMIN — ZINC SULFATE 220 MG (50 MG) CAPSULE 50 MG: CAPSULE at 08:45

## 2025-01-24 RX ADMIN — OSELTAMIVIR PHOSPHATE 75 MG: 75 CAPSULE ORAL at 11:51

## 2025-01-24 RX ADMIN — IPRATROPIUM BROMIDE AND ALBUTEROL SULFATE 1 DOSE: 2.5; .5 SOLUTION RESPIRATORY (INHALATION) at 01:35

## 2025-01-24 RX ADMIN — DIAZEPAM 5 MG: 5 TABLET ORAL at 11:51

## 2025-01-24 RX ADMIN — IPRATROPIUM BROMIDE AND ALBUTEROL SULFATE 1 DOSE: 2.5; .5 SOLUTION RESPIRATORY (INHALATION) at 21:17

## 2025-01-24 RX ADMIN — MIDODRINE HYDROCHLORIDE 5 MG: 5 TABLET ORAL at 11:51

## 2025-01-24 RX ADMIN — CEFTRIAXONE SODIUM 2000 MG: 2 INJECTION, POWDER, FOR SOLUTION INTRAMUSCULAR; INTRAVENOUS at 04:52

## 2025-01-24 RX ADMIN — MIDODRINE HYDROCHLORIDE 5 MG: 5 TABLET ORAL at 17:45

## 2025-01-24 RX ADMIN — SODIUM CHLORIDE, PRESERVATIVE FREE 10 ML: 5 INJECTION INTRAVENOUS at 08:58

## 2025-01-24 RX ADMIN — BUDESONIDE AND FORMOTEROL FUMARATE DIHYDRATE 2 PUFF: 160; 4.5 AEROSOL RESPIRATORY (INHALATION) at 21:18

## 2025-01-24 RX ADMIN — IPRATROPIUM BROMIDE AND ALBUTEROL SULFATE 1 DOSE: 2.5; .5 SOLUTION RESPIRATORY (INHALATION) at 04:15

## 2025-01-24 RX ADMIN — SODIUM CHLORIDE, PRESERVATIVE FREE 10 ML: 5 INJECTION INTRAVENOUS at 21:09

## 2025-01-24 RX ADMIN — ACETAMINOPHEN 650 MG: 650 SOLUTION ORAL at 14:38

## 2025-01-24 RX ADMIN — MIDODRINE HYDROCHLORIDE 5 MG: 5 TABLET ORAL at 08:37

## 2025-01-24 RX ADMIN — DIAZEPAM 5 MG: 5 TABLET ORAL at 21:09

## 2025-01-24 RX ADMIN — ACETAMINOPHEN 650 MG: 650 SOLUTION ORAL at 17:45

## 2025-01-24 RX ADMIN — OSELTAMIVIR PHOSPHATE 75 MG: 75 CAPSULE ORAL at 21:09

## 2025-01-24 RX ADMIN — ENOXAPARIN SODIUM 40 MG: 100 INJECTION SUBCUTANEOUS at 08:57

## 2025-01-24 RX ADMIN — Medication 100 MG: at 08:45

## 2025-01-24 RX ADMIN — PREDNISONE 40 MG: 20 TABLET ORAL at 08:38

## 2025-01-24 NOTE — CARE COORDINATION
Cm in to visit pt. Pt is on bipap at present with son at bedside. Pt provided pt with permission to talk with son for assessment information. Pt lives alone in a 1 floor home(1st floor of an upstairs/downstairs duplex)Can we get PT/OT consult to assist in determining discharge needs has 3 children; son-Chapin, son-Peterson and a nephew-lou whom pt raised per son. Peterson and his wife, soren, take pt's laundry to their home to do. Family members  groceries for pt. Pt has home 02, neb and shower chair. Son states that pt hasn't driven for a few months and rarely goes out in cold weather.  Pt has never had HC nor been to SNF. Cm asked son if he thought pt might be open to the idea of having a HC RN visit and check on her and her resp status and he was unsure. Cm will return to see pt when she if off the bipap and able to talk with CM. Cm will follow for needs at discharge.

## 2025-01-25 ENCOUNTER — APPOINTMENT (OUTPATIENT)
Dept: GENERAL RADIOLOGY | Age: 60
DRG: 133 | End: 2025-01-25
Payer: COMMERCIAL

## 2025-01-25 LAB
ALBUMIN SERPL-MCNC: 3.5 G/DL (ref 3.4–5)
ALBUMIN/GLOB SERPL: 1.7 {RATIO} (ref 1.1–2.2)
ALP SERPL-CCNC: 73 U/L (ref 40–129)
ALT SERPL-CCNC: 53 U/L (ref 10–40)
ANION GAP SERPL CALCULATED.3IONS-SCNC: 6 MMOL/L (ref 9–17)
ARTERIAL PATENCY WRIST A: ABNORMAL
ARTERIAL PATENCY WRIST A: ABNORMAL
ARTERIAL PATENCY WRIST A: YES
AST SERPL-CCNC: 92 U/L (ref 15–37)
BASOPHILS # BLD: 0 K/UL
BASOPHILS NFR BLD: 0 % (ref 0–1)
BDY SITE: ABNORMAL
BILIRUB DIRECT SERPL-MCNC: <0.2 MG/DL (ref 0–0.3)
BILIRUB INDIRECT SERPL-MCNC: ABNORMAL MG/DL (ref 0–0.7)
BILIRUB SERPL-MCNC: <0.2 MG/DL (ref 0–1)
BODY TEMPERATURE: 37
BUN SERPL-MCNC: 20 MG/DL (ref 7–20)
CA-I BLD-SCNC: 1.3 MMOL/L (ref 1.15–1.33)
CALCIUM SERPL-MCNC: 8.8 MG/DL (ref 8.3–10.6)
CHLORIDE SERPL-SCNC: 102 MMOL/L (ref 99–110)
CO2 SERPL-SCNC: 38 MMOL/L (ref 21–32)
COHGB MFR BLD: 0 % (ref 0.5–1.5)
COHGB MFR BLD: 0.2 % (ref 0.5–1.5)
COHGB MFR BLD: 0.6 % (ref 0.5–1.5)
CREAT SERPL-MCNC: 0.5 MG/DL (ref 0.6–1.2)
EOSINOPHIL # BLD: 0 K/UL
EOSINOPHILS RELATIVE PERCENT: 0 % (ref 0–3)
ERYTHROCYTE [DISTWIDTH] IN BLOOD BY AUTOMATED COUNT: 12.7 % (ref 11.7–14.9)
GAS FLOW.O2 O2 DELIVERY SYS: ABNORMAL L/MIN
GAS FLOW.O2 SETTING OXYMISER: 16 L/MIN
GFR, ESTIMATED: >90 ML/MIN/1.73M2
GLUCOSE BLD-MCNC: 118 MG/DL (ref 74–99)
GLUCOSE BLD-MCNC: 146 MG/DL (ref 74–99)
GLUCOSE BLD-MCNC: 150 MG/DL (ref 74–99)
GLUCOSE BLD-MCNC: 90 MG/DL (ref 74–99)
GLUCOSE BLD-MCNC: 92 MG/DL (ref 74–99)
GLUCOSE SERPL-MCNC: 87 MG/DL (ref 74–99)
HCO3 ARTERIAL: 39.8 MMOL/L (ref 21–28)
HCO3 ARTERIAL: 47.5 MMOL/L (ref 21–28)
HCO3 VENOUS: 39.7 MMOL/L (ref 22–29)
HCT VFR BLD AUTO: 40.8 % (ref 37–47)
HGB BLD-MCNC: 11.9 G/DL (ref 12.5–16)
IMM GRANULOCYTES # BLD AUTO: 0.02 K/UL
IMM GRANULOCYTES NFR BLD: 0 %
INR PPP: 0.8
LYMPHOCYTES NFR BLD: 0.59 K/UL
LYMPHOCYTES RELATIVE PERCENT: 10 % (ref 24–44)
MAGNESIUM SERPL-MCNC: 2.3 MG/DL (ref 1.8–2.4)
MCH RBC QN AUTO: 29.3 PG (ref 27–31)
MCHC RBC AUTO-ENTMCNC: 29.2 G/DL (ref 32–36)
MCV RBC AUTO: 100.5 FL (ref 78–100)
METHEMOGLOBIN: 0.5 % (ref 0.5–1.5)
METHEMOGLOBIN: 0.5 % (ref 0.5–1.5)
METHEMOGLOBIN: 0.6 % (ref 0.5–1.5)
MONOCYTES NFR BLD: 0.59 K/UL
MONOCYTES NFR BLD: 10 % (ref 0–4)
NEUTROPHILS NFR BLD: 81 % (ref 36–66)
NEUTS SEG NFR BLD: 5.01 K/UL
OXYHGB MFR BLD: 83.7 %
OXYHGB MFR BLD: 91.2 %
OXYHGB MFR BLD: 98.9 %
PCO2 ARTERIAL: 151 MMHG (ref 32–45)
PCO2 ARTERIAL: 76.6 MMHG (ref 32–45)
PCO2 VENOUS: 93.3 MM HG (ref 38–54)
PEEP RESPIRATORY: 5 CM[H2O]
PH ARTERIAL: 7.12 (ref 7.35–7.45)
PH ARTERIAL: 7.33 (ref 7.35–7.45)
PH VENOUS: 7.25 (ref 7.32–7.43)
PHOSPHATE SERPL-MCNC: 1.7 MG/DL (ref 2.5–4.9)
PLATELET # BLD AUTO: 117 K/UL (ref 140–440)
PMV BLD AUTO: 12.2 FL (ref 7.5–11.1)
PO2 ARTERIAL: 397.3 MMHG (ref 83–108)
PO2 ARTERIAL: 75.7 MMHG (ref 83–108)
PO2 VENOUS: 51.5 MM HG (ref 23–48)
POSITIVE BASE EXCESS, ART: 10.9 MMOL/L (ref 0–3)
POSITIVE BASE EXCESS, ART: 11.9 MMOL/L (ref 0–3)
POSITIVE BASE EXCESS, VEN: 8.9 MMOL/L (ref 0–3)
POTASSIUM SERPL-SCNC: 4.6 MMOL/L (ref 3.5–5.1)
PROCALCITONIN SERPL-MCNC: 0.09 NG/ML
PROT SERPL-MCNC: 5.6 G/DL (ref 6.4–8.2)
PROTHROMBIN TIME: 11.8 SEC (ref 11.7–14.5)
RBC # BLD AUTO: 4.06 M/UL (ref 4.2–5.4)
SODIUM SERPL-SCNC: 146 MMOL/L (ref 136–145)
TEXT FOR RESPIRATORY: ABNORMAL
VT: 400
WBC OTHER # BLD: 6.2 K/UL (ref 4–10.5)

## 2025-01-25 PROCEDURE — 85025 COMPLETE CBC W/AUTO DIFF WBC: CPT

## 2025-01-25 PROCEDURE — 6360000002 HC RX W HCPCS: Performed by: STUDENT IN AN ORGANIZED HEALTH CARE EDUCATION/TRAINING PROGRAM

## 2025-01-25 PROCEDURE — 2000000000 HC ICU R&B

## 2025-01-25 PROCEDURE — 94761 N-INVAS EAR/PLS OXIMETRY MLT: CPT

## 2025-01-25 PROCEDURE — 36592 COLLECT BLOOD FROM PICC: CPT

## 2025-01-25 PROCEDURE — 83735 ASSAY OF MAGNESIUM: CPT

## 2025-01-25 PROCEDURE — 6360000002 HC RX W HCPCS

## 2025-01-25 PROCEDURE — 6360000002 HC RX W HCPCS: Performed by: NURSE PRACTITIONER

## 2025-01-25 PROCEDURE — 2580000003 HC RX 258: Performed by: STUDENT IN AN ORGANIZED HEALTH CARE EDUCATION/TRAINING PROGRAM

## 2025-01-25 PROCEDURE — 82330 ASSAY OF CALCIUM: CPT

## 2025-01-25 PROCEDURE — 6370000000 HC RX 637 (ALT 250 FOR IP): Performed by: NURSE PRACTITIONER

## 2025-01-25 PROCEDURE — 84100 ASSAY OF PHOSPHORUS: CPT

## 2025-01-25 PROCEDURE — 82248 BILIRUBIN DIRECT: CPT

## 2025-01-25 PROCEDURE — 2700000000 HC OXYGEN THERAPY PER DAY

## 2025-01-25 PROCEDURE — 36415 COLL VENOUS BLD VENIPUNCTURE: CPT

## 2025-01-25 PROCEDURE — 74018 RADEX ABDOMEN 1 VIEW: CPT

## 2025-01-25 PROCEDURE — 2500000003 HC RX 250 WO HCPCS: Performed by: STUDENT IN AN ORGANIZED HEALTH CARE EDUCATION/TRAINING PROGRAM

## 2025-01-25 PROCEDURE — 2500000003 HC RX 250 WO HCPCS: Performed by: NURSE PRACTITIONER

## 2025-01-25 PROCEDURE — 82962 GLUCOSE BLOOD TEST: CPT

## 2025-01-25 PROCEDURE — 94003 VENT MGMT INPAT SUBQ DAY: CPT

## 2025-01-25 PROCEDURE — 6370000000 HC RX 637 (ALT 250 FOR IP): Performed by: SPECIALIST

## 2025-01-25 PROCEDURE — 82805 BLOOD GASES W/O2 SATURATION: CPT

## 2025-01-25 PROCEDURE — 6370000000 HC RX 637 (ALT 250 FOR IP): Performed by: STUDENT IN AN ORGANIZED HEALTH CARE EDUCATION/TRAINING PROGRAM

## 2025-01-25 PROCEDURE — 80053 COMPREHEN METABOLIC PANEL: CPT

## 2025-01-25 PROCEDURE — 0BH17EZ INSERTION OF ENDOTRACHEAL AIRWAY INTO TRACHEA, VIA NATURAL OR ARTIFICIAL OPENING: ICD-10-PCS | Performed by: INTERNAL MEDICINE

## 2025-01-25 PROCEDURE — 94640 AIRWAY INHALATION TREATMENT: CPT

## 2025-01-25 PROCEDURE — 85610 PROTHROMBIN TIME: CPT

## 2025-01-25 PROCEDURE — 84145 PROCALCITONIN (PCT): CPT

## 2025-01-25 RX ORDER — IPRATROPIUM BROMIDE AND ALBUTEROL SULFATE 2.5; .5 MG/3ML; MG/3ML
1 SOLUTION RESPIRATORY (INHALATION) EVERY 4 HOURS PRN
Status: DISCONTINUED | OUTPATIENT
Start: 2025-01-25 | End: 2025-01-27 | Stop reason: HOSPADM

## 2025-01-25 RX ORDER — MIDAZOLAM HYDROCHLORIDE 2 MG/2ML
2 INJECTION, SOLUTION INTRAMUSCULAR; INTRAVENOUS ONCE
Status: COMPLETED | OUTPATIENT
Start: 2025-01-25 | End: 2025-01-25

## 2025-01-25 RX ORDER — MIDAZOLAM HYDROCHLORIDE 1 MG/ML
INJECTION, SOLUTION INTRAMUSCULAR; INTRAVENOUS
Status: COMPLETED
Start: 2025-01-25 | End: 2025-01-25

## 2025-01-25 RX ORDER — PROPOFOL 10 MG/ML
5-50 INJECTION, EMULSION INTRAVENOUS CONTINUOUS
Status: DISCONTINUED | OUTPATIENT
Start: 2025-01-25 | End: 2025-01-27

## 2025-01-25 RX ORDER — FENTANYL CITRATE-0.9 % NACL/PF 10 MCG/ML
25-200 PLASTIC BAG, INJECTION (ML) INTRAVENOUS CONTINUOUS
Status: DISCONTINUED | OUTPATIENT
Start: 2025-01-25 | End: 2025-01-27

## 2025-01-25 RX ORDER — DEXMEDETOMIDINE HYDROCHLORIDE 4 UG/ML
.1-1.5 INJECTION, SOLUTION INTRAVENOUS CONTINUOUS
Status: DISCONTINUED | OUTPATIENT
Start: 2025-01-25 | End: 2025-01-26

## 2025-01-25 RX ADMIN — MUPIROCIN: 20 OINTMENT TOPICAL at 02:03

## 2025-01-25 RX ADMIN — ENOXAPARIN SODIUM 40 MG: 100 INJECTION SUBCUTANEOUS at 08:36

## 2025-01-25 RX ADMIN — ACETAMINOPHEN 650 MG: 650 SOLUTION ORAL at 23:35

## 2025-01-25 RX ADMIN — DEXMEDETOMIDINE HYDROCHLORIDE 0.2 MCG/KG/HR: 4 INJECTION, SOLUTION INTRAVENOUS at 02:13

## 2025-01-25 RX ADMIN — FOLIC ACID 1 MG: 1 TABLET ORAL at 08:36

## 2025-01-25 RX ADMIN — OSELTAMIVIR PHOSPHATE 75 MG: 75 CAPSULE ORAL at 20:50

## 2025-01-25 RX ADMIN — Medication 50 MCG/HR: at 18:15

## 2025-01-25 RX ADMIN — IPRATROPIUM BROMIDE AND ALBUTEROL SULFATE 1 DOSE: 2.5; .5 SOLUTION RESPIRATORY (INHALATION) at 03:04

## 2025-01-25 RX ADMIN — PREDNISONE 40 MG: 20 TABLET ORAL at 08:36

## 2025-01-25 RX ADMIN — CHLORHEXIDINE GLUCONATE: 4 LIQUID TOPICAL at 08:38

## 2025-01-25 RX ADMIN — MIDODRINE HYDROCHLORIDE 5 MG: 5 TABLET ORAL at 18:16

## 2025-01-25 RX ADMIN — VANCOMYCIN HYDROCHLORIDE 1250 MG: 1.25 INJECTION, POWDER, LYOPHILIZED, FOR SOLUTION INTRAVENOUS at 02:19

## 2025-01-25 RX ADMIN — Medication 15 ML: at 08:35

## 2025-01-25 RX ADMIN — ASPIRIN 81 MG CHEWABLE TABLET 81 MG: 81 TABLET CHEWABLE at 08:35

## 2025-01-25 RX ADMIN — IPRATROPIUM BROMIDE AND ALBUTEROL SULFATE 1 DOSE: 2.5; .5 SOLUTION RESPIRATORY (INHALATION) at 08:22

## 2025-01-25 RX ADMIN — DIAZEPAM 5 MG: 5 TABLET ORAL at 08:35

## 2025-01-25 RX ADMIN — IPRATROPIUM BROMIDE AND ALBUTEROL SULFATE 1 DOSE: 2.5; .5 SOLUTION RESPIRATORY (INHALATION) at 23:43

## 2025-01-25 RX ADMIN — IPRATROPIUM BROMIDE AND ALBUTEROL SULFATE 1 DOSE: 2.5; .5 SOLUTION RESPIRATORY (INHALATION) at 10:29

## 2025-01-25 RX ADMIN — PROPOFOL 20 MCG/KG/MIN: 10 INJECTION, EMULSION INTRAVENOUS at 13:00

## 2025-01-25 RX ADMIN — IPRATROPIUM BROMIDE AND ALBUTEROL SULFATE 1 DOSE: 2.5; .5 SOLUTION RESPIRATORY (INHALATION) at 19:53

## 2025-01-25 RX ADMIN — MIDODRINE HYDROCHLORIDE 5 MG: 5 TABLET ORAL at 13:25

## 2025-01-25 RX ADMIN — IPRATROPIUM BROMIDE AND ALBUTEROL SULFATE 1 DOSE: 2.5; .5 SOLUTION RESPIRATORY (INHALATION) at 12:00

## 2025-01-25 RX ADMIN — CEFTRIAXONE SODIUM 2000 MG: 2 INJECTION, POWDER, FOR SOLUTION INTRAMUSCULAR; INTRAVENOUS at 05:05

## 2025-01-25 RX ADMIN — Medication 100 MG: at 08:35

## 2025-01-25 RX ADMIN — MUPIROCIN: 20 OINTMENT TOPICAL at 08:36

## 2025-01-25 RX ADMIN — SODIUM CHLORIDE, PRESERVATIVE FREE 10 ML: 5 INJECTION INTRAVENOUS at 20:36

## 2025-01-25 RX ADMIN — DIAZEPAM 5 MG: 5 TABLET ORAL at 02:14

## 2025-01-25 RX ADMIN — MUPIROCIN: 20 OINTMENT TOPICAL at 20:35

## 2025-01-25 RX ADMIN — ACETAMINOPHEN 650 MG: 650 SOLUTION ORAL at 02:01

## 2025-01-25 RX ADMIN — ACETAZOLAMIDE SODIUM 500 MG: 500 INJECTION, POWDER, LYOPHILIZED, FOR SOLUTION INTRAVENOUS at 23:56

## 2025-01-25 RX ADMIN — ZINC SULFATE 220 MG (50 MG) CAPSULE 50 MG: CAPSULE at 08:35

## 2025-01-25 RX ADMIN — MIDAZOLAM HYDROCHLORIDE 2 MG: 2 INJECTION, SOLUTION INTRAMUSCULAR; INTRAVENOUS at 12:47

## 2025-01-25 RX ADMIN — ATORVASTATIN CALCIUM 40 MG: 40 TABLET, FILM COATED ORAL at 20:50

## 2025-01-25 RX ADMIN — PROPOFOL 35 MCG/KG/MIN: 10 INJECTION, EMULSION INTRAVENOUS at 20:51

## 2025-01-25 RX ADMIN — ACETAMINOPHEN 650 MG: 650 SOLUTION ORAL at 06:18

## 2025-01-25 RX ADMIN — WATER 60 MG: 1 INJECTION INTRAMUSCULAR; INTRAVENOUS; SUBCUTANEOUS at 23:36

## 2025-01-25 RX ADMIN — NOREPINEPHRINE BITARTRATE 5 MCG/MIN: 0.06 INJECTION, SOLUTION INTRAVENOUS at 11:15

## 2025-01-25 RX ADMIN — VANCOMYCIN HYDROCHLORIDE 750 MG: 750 INJECTION, POWDER, LYOPHILIZED, FOR SOLUTION INTRAVENOUS at 15:11

## 2025-01-25 RX ADMIN — BUDESONIDE AND FORMOTEROL FUMARATE DIHYDRATE 2 PUFF: 160; 4.5 AEROSOL RESPIRATORY (INHALATION) at 08:23

## 2025-01-25 RX ADMIN — IPRATROPIUM BROMIDE AND ALBUTEROL SULFATE 1 DOSE: 2.5; .5 SOLUTION RESPIRATORY (INHALATION) at 16:27

## 2025-01-25 RX ADMIN — MIDAZOLAM 2 MG: 1 INJECTION INTRAMUSCULAR; INTRAVENOUS at 12:47

## 2025-01-25 RX ADMIN — SODIUM CHLORIDE, PRESERVATIVE FREE 10 ML: 5 INJECTION INTRAVENOUS at 08:37

## 2025-01-25 RX ADMIN — ACETAMINOPHEN 650 MG: 650 SOLUTION ORAL at 13:25

## 2025-01-25 RX ADMIN — ACETAMINOPHEN 650 MG: 650 SOLUTION ORAL at 18:16

## 2025-01-25 RX ADMIN — OSELTAMIVIR PHOSPHATE 75 MG: 75 CAPSULE ORAL at 08:35

## 2025-01-25 RX ADMIN — BUDESONIDE AND FORMOTEROL FUMARATE DIHYDRATE 2 PUFF: 160; 4.5 AEROSOL RESPIRATORY (INHALATION) at 19:54

## 2025-01-25 ASSESSMENT — PAIN DESCRIPTION - LOCATION
LOCATION: COCCYX
LOCATION: HEAD
LOCATION: COCCYX
LOCATION: OTHER (COMMENT)
LOCATION: HEAD

## 2025-01-25 ASSESSMENT — PAIN DESCRIPTION - DESCRIPTORS
DESCRIPTORS: ACHING
DESCRIPTORS: ACHING
DESCRIPTORS: OTHER (COMMENT)
DESCRIPTORS: OTHER (COMMENT)

## 2025-01-25 ASSESSMENT — PAIN DESCRIPTION - ORIENTATION
ORIENTATION: MID
ORIENTATION: MID
ORIENTATION: OTHER (COMMENT)
ORIENTATION: MID
ORIENTATION: MID

## 2025-01-25 ASSESSMENT — PAIN SCALES - GENERAL
PAINLEVEL_OUTOF10: 0

## 2025-01-25 ASSESSMENT — PULMONARY FUNCTION TESTS
PIF_VALUE: 25
PIF_VALUE: 27
PIF_VALUE: 24
PIF_VALUE: 25
PIF_VALUE: 24

## 2025-01-25 NOTE — PROCEDURES
PROCEDURE NOTE  Date: 1/25/2025   Name: Eloina Roca  YOB: 1965    Intubation    Date/Time: 1/25/2025 1:24 PM    Performed by: Brigida Kulkarni APRN - CNP  Authorized by: Brigida Kulkarni APRN - CNP  Consent: Verbal consent obtained.  Risks and benefits: risks, benefits and alternatives were discussed  Consent given by: patient  Patient understanding: patient states understanding of the procedure being performed  Imaging studies: imaging studies available  Patient identity confirmed: arm band  Time out: Immediately prior to procedure a \"time out\" was called to verify the correct patient, procedure, equipment, support staff and site/side marked as required.  Indications: respiratory distress, hypoxemia, hypercapnia and respiratory failure  Intubation method: direct  Patient status: paralyzed (RSI)  Preoxygenation: BVM  Pretreatment medications: none  Sedatives: etomidate  Paralytic: rocuronium  Laryngoscope size: Mac 3  Tube size: 7.0 mm  Tube type: cuffed  Number of attempts: 1  Ventilation between attempts: BVM  Cricoid pressure: no  Cords visualized: yes  Post-procedure assessment: chest rise and CO2 detector  Breath sounds: equal  Cuff inflated: yes  ETT to lip: 21 cm  Tube secured with: ETT poe  Chest x-ray interpreted by me.  Chest x-ray findings: endotracheal tube in appropriate position  Patient tolerance: patient tolerated the procedure well with no immediate complications          Brigida Kulkarni CNP  Critical Care Medicine

## 2025-01-26 LAB
ALBUMIN SERPL-MCNC: 3.3 G/DL (ref 3.4–5)
ALBUMIN/GLOB SERPL: 1.5 {RATIO} (ref 1.1–2.2)
ALP SERPL-CCNC: 68 U/L (ref 40–129)
ALT SERPL-CCNC: 42 U/L (ref 10–40)
ANION GAP SERPL CALCULATED.3IONS-SCNC: 7 MMOL/L (ref 9–17)
ARTERIAL PATENCY WRIST A: ABNORMAL
ARTERIAL PATENCY WRIST A: ABNORMAL
AST SERPL-CCNC: 53 U/L (ref 15–37)
BASOPHILS # BLD: 0 K/UL
BASOPHILS NFR BLD: 0 % (ref 0–1)
BILIRUB DIRECT SERPL-MCNC: <0.2 MG/DL (ref 0–0.3)
BILIRUB INDIRECT SERPL-MCNC: ABNORMAL MG/DL (ref 0–0.7)
BILIRUB SERPL-MCNC: <0.2 MG/DL (ref 0–1)
BODY TEMPERATURE: 37
BODY TEMPERATURE: 37
BUN SERPL-MCNC: 19 MG/DL (ref 7–20)
CA-I BLD-SCNC: 1.29 MMOL/L (ref 1.15–1.33)
CALCIUM SERPL-MCNC: 8.6 MG/DL (ref 8.3–10.6)
CHLORIDE SERPL-SCNC: 102 MMOL/L (ref 99–110)
CO2 SERPL-SCNC: 35 MMOL/L (ref 21–32)
COHGB MFR BLD: 0.1 % (ref 0.5–1.5)
COHGB MFR BLD: 0.1 % (ref 0.5–1.5)
CREAT SERPL-MCNC: 0.6 MG/DL (ref 0.6–1.2)
DATE LAST DOSE: NORMAL
EOSINOPHIL # BLD: 0 K/UL
EOSINOPHILS RELATIVE PERCENT: 0 % (ref 0–3)
ERYTHROCYTE [DISTWIDTH] IN BLOOD BY AUTOMATED COUNT: 12.4 % (ref 11.7–14.9)
GAS FLOW.O2 O2 DELIVERY SYS: ABNORMAL L/MIN
GAS FLOW.O2 O2 DELIVERY SYS: ABNORMAL L/MIN
GFR, ESTIMATED: >90 ML/MIN/1.73M2
GLUCOSE BLD-MCNC: 146 MG/DL (ref 74–99)
GLUCOSE BLD-MCNC: 178 MG/DL (ref 74–99)
GLUCOSE SERPL-MCNC: 160 MG/DL (ref 74–99)
HCO3 VENOUS: 29.4 MMOL/L (ref 22–29)
HCO3 VENOUS: 39.8 MMOL/L (ref 22–29)
HCT VFR BLD AUTO: 40.7 % (ref 37–47)
HGB BLD-MCNC: 11.9 G/DL (ref 12.5–16)
INR PPP: 0.8
LYMPHOCYTES NFR BLD: 0.11 K/UL
LYMPHOCYTES RELATIVE PERCENT: 3 % (ref 24–44)
MAGNESIUM SERPL-MCNC: 2.1 MG/DL (ref 1.8–2.4)
MCH RBC QN AUTO: 30.1 PG (ref 27–31)
MCHC RBC AUTO-ENTMCNC: 29.2 G/DL (ref 32–36)
MCV RBC AUTO: 103 FL (ref 78–100)
METHEMOGLOBIN: 0.4 % (ref 0.5–1.5)
METHEMOGLOBIN: 0.7 % (ref 0.5–1.5)
MONOCYTES NFR BLD: 0.14 K/UL
MONOCYTES NFR BLD: 4 % (ref 0–4)
NEUTROPHILS NFR BLD: 93 % (ref 36–66)
NEUTS SEG NFR BLD: 3.26 K/UL
OXYHGB MFR BLD: 82.4 %
OXYHGB MFR BLD: 98.4 %
PCO2 VENOUS: 100.2 MM HG (ref 38–54)
PCO2 VENOUS: 62 MM HG (ref 38–54)
PH VENOUS: 7.22 (ref 7.32–7.43)
PH VENOUS: 7.29 (ref 7.32–7.43)
PHOSPHATE SERPL-MCNC: 2.7 MG/DL (ref 2.5–4.9)
PLATELET # BLD AUTO: 131 K/UL (ref 140–440)
PLATELET ESTIMATE: NORMAL
PMV BLD AUTO: 12.4 FL (ref 7.5–11.1)
PO2 VENOUS: 224.7 MM HG (ref 23–48)
PO2 VENOUS: 47.4 MM HG (ref 23–48)
POSITIVE BASE EXCESS, VEN: 1.6 MMOL/L (ref 0–3)
POSITIVE BASE EXCESS, VEN: 8.5 MMOL/L (ref 0–3)
POTASSIUM SERPL-SCNC: 4.2 MMOL/L (ref 3.5–5.1)
PROT SERPL-MCNC: 5.5 G/DL (ref 6.4–8.2)
PROTHROMBIN TIME: 11.5 SEC (ref 11.7–14.5)
RBC # BLD AUTO: 3.95 M/UL (ref 4.2–5.4)
RBC # BLD: NORMAL 10*6/UL
SODIUM SERPL-SCNC: 143 MMOL/L (ref 136–145)
TEXT FOR RESPIRATORY: ABNORMAL
TEXT FOR RESPIRATORY: ABNORMAL
TME LAST DOSE: NORMAL H
VANCOMYCIN DOSE: NORMAL MG
VANCOMYCIN SERPL-MCNC: 20 UG/ML (ref 10–20)
WBC # BLD: NORMAL 10*3/UL
WBC OTHER # BLD: 3.5 K/UL (ref 4–10.5)

## 2025-01-26 PROCEDURE — 6370000000 HC RX 637 (ALT 250 FOR IP): Performed by: REGISTERED NURSE

## 2025-01-26 PROCEDURE — 36592 COLLECT BLOOD FROM PICC: CPT

## 2025-01-26 PROCEDURE — 2500000003 HC RX 250 WO HCPCS: Performed by: REGISTERED NURSE

## 2025-01-26 PROCEDURE — 6360000002 HC RX W HCPCS: Performed by: NURSE PRACTITIONER

## 2025-01-26 PROCEDURE — 2000000000 HC ICU R&B

## 2025-01-26 PROCEDURE — 6360000002 HC RX W HCPCS: Performed by: STUDENT IN AN ORGANIZED HEALTH CARE EDUCATION/TRAINING PROGRAM

## 2025-01-26 PROCEDURE — 6370000000 HC RX 637 (ALT 250 FOR IP): Performed by: STUDENT IN AN ORGANIZED HEALTH CARE EDUCATION/TRAINING PROGRAM

## 2025-01-26 PROCEDURE — 2500000003 HC RX 250 WO HCPCS: Performed by: NURSE PRACTITIONER

## 2025-01-26 PROCEDURE — 89220 SPUTUM SPECIMEN COLLECTION: CPT

## 2025-01-26 PROCEDURE — 94761 N-INVAS EAR/PLS OXIMETRY MLT: CPT

## 2025-01-26 PROCEDURE — 6360000002 HC RX W HCPCS: Performed by: REGISTERED NURSE

## 2025-01-26 PROCEDURE — 94003 VENT MGMT INPAT SUBQ DAY: CPT

## 2025-01-26 PROCEDURE — 6370000000 HC RX 637 (ALT 250 FOR IP): Performed by: NURSE PRACTITIONER

## 2025-01-26 PROCEDURE — 94640 AIRWAY INHALATION TREATMENT: CPT

## 2025-01-26 PROCEDURE — 6370000000 HC RX 637 (ALT 250 FOR IP): Performed by: SPECIALIST

## 2025-01-26 PROCEDURE — 2700000000 HC OXYGEN THERAPY PER DAY

## 2025-01-26 PROCEDURE — 2500000003 HC RX 250 WO HCPCS: Performed by: STUDENT IN AN ORGANIZED HEALTH CARE EDUCATION/TRAINING PROGRAM

## 2025-01-26 PROCEDURE — 2580000003 HC RX 258: Performed by: STUDENT IN AN ORGANIZED HEALTH CARE EDUCATION/TRAINING PROGRAM

## 2025-01-26 RX ORDER — SENNOSIDES A AND B 8.6 MG/1
2 TABLET, FILM COATED ORAL 2 TIMES DAILY
Status: DISCONTINUED | OUTPATIENT
Start: 2025-01-26 | End: 2025-01-27 | Stop reason: HOSPADM

## 2025-01-26 RX ORDER — PREDNISONE 20 MG/1
40 TABLET ORAL DAILY
Status: DISCONTINUED | OUTPATIENT
Start: 2025-01-27 | End: 2025-01-27 | Stop reason: HOSPADM

## 2025-01-26 RX ORDER — DEXMEDETOMIDINE HYDROCHLORIDE 4 UG/ML
.1-1.5 INJECTION, SOLUTION INTRAVENOUS CONTINUOUS
Status: DISCONTINUED | OUTPATIENT
Start: 2025-01-26 | End: 2025-01-27

## 2025-01-26 RX ADMIN — WATER 60 MG: 1 INJECTION INTRAMUSCULAR; INTRAVENOUS; SUBCUTANEOUS at 15:23

## 2025-01-26 RX ADMIN — SENNOSIDES 17.2 MG: 8.6 TABLET, FILM COATED ORAL at 21:32

## 2025-01-26 RX ADMIN — ENOXAPARIN SODIUM 40 MG: 100 INJECTION SUBCUTANEOUS at 08:12

## 2025-01-26 RX ADMIN — ACETAZOLAMIDE SODIUM 500 MG: 500 INJECTION, POWDER, LYOPHILIZED, FOR SOLUTION INTRAVENOUS at 17:41

## 2025-01-26 RX ADMIN — ACETAMINOPHEN 650 MG: 650 SOLUTION ORAL at 17:43

## 2025-01-26 RX ADMIN — Medication 100 MG: at 08:14

## 2025-01-26 RX ADMIN — WATER 60 MG: 1 INJECTION INTRAMUSCULAR; INTRAVENOUS; SUBCUTANEOUS at 08:11

## 2025-01-26 RX ADMIN — ACETAZOLAMIDE SODIUM 500 MG: 500 INJECTION, POWDER, LYOPHILIZED, FOR SOLUTION INTRAVENOUS at 05:45

## 2025-01-26 RX ADMIN — MIDODRINE HYDROCHLORIDE 5 MG: 5 TABLET ORAL at 08:15

## 2025-01-26 RX ADMIN — VANCOMYCIN HYDROCHLORIDE 750 MG: 750 INJECTION, POWDER, LYOPHILIZED, FOR SOLUTION INTRAVENOUS at 15:32

## 2025-01-26 RX ADMIN — ZINC SULFATE 220 MG (50 MG) CAPSULE 50 MG: CAPSULE at 08:14

## 2025-01-26 RX ADMIN — OSELTAMIVIR PHOSPHATE 75 MG: 75 CAPSULE ORAL at 21:32

## 2025-01-26 RX ADMIN — MUPIROCIN: 20 OINTMENT TOPICAL at 21:34

## 2025-01-26 RX ADMIN — CEFTRIAXONE SODIUM 2000 MG: 2 INJECTION, POWDER, FOR SOLUTION INTRAMUSCULAR; INTRAVENOUS at 04:18

## 2025-01-26 RX ADMIN — Medication 15 ML: at 08:11

## 2025-01-26 RX ADMIN — IPRATROPIUM BROMIDE AND ALBUTEROL SULFATE 1 DOSE: 2.5; .5 SOLUTION RESPIRATORY (INHALATION) at 23:59

## 2025-01-26 RX ADMIN — ACETAMINOPHEN 650 MG: 650 SOLUTION ORAL at 05:37

## 2025-01-26 RX ADMIN — PROPOFOL 45 MCG/KG/MIN: 10 INJECTION, EMULSION INTRAVENOUS at 12:11

## 2025-01-26 RX ADMIN — MUPIROCIN: 20 OINTMENT TOPICAL at 08:15

## 2025-01-26 RX ADMIN — ASPIRIN 81 MG CHEWABLE TABLET 81 MG: 81 TABLET CHEWABLE at 08:15

## 2025-01-26 RX ADMIN — IPRATROPIUM BROMIDE AND ALBUTEROL SULFATE 1 DOSE: 2.5; .5 SOLUTION RESPIRATORY (INHALATION) at 09:19

## 2025-01-26 RX ADMIN — MIDODRINE HYDROCHLORIDE 5 MG: 5 TABLET ORAL at 12:10

## 2025-01-26 RX ADMIN — BUDESONIDE AND FORMOTEROL FUMARATE DIHYDRATE 2 PUFF: 160; 4.5 AEROSOL RESPIRATORY (INHALATION) at 09:20

## 2025-01-26 RX ADMIN — ACETAMINOPHEN 650 MG: 650 SOLUTION ORAL at 12:09

## 2025-01-26 RX ADMIN — WATER 60 MG: 1 INJECTION INTRAMUSCULAR; INTRAVENOUS; SUBCUTANEOUS at 04:04

## 2025-01-26 RX ADMIN — PROPOFOL 40 MCG/KG/MIN: 10 INJECTION, EMULSION INTRAVENOUS at 19:23

## 2025-01-26 RX ADMIN — FOLIC ACID 1 MG: 1 TABLET ORAL at 08:14

## 2025-01-26 RX ADMIN — CHLORHEXIDINE GLUCONATE: 4 LIQUID TOPICAL at 08:16

## 2025-01-26 RX ADMIN — OSELTAMIVIR PHOSPHATE 75 MG: 75 CAPSULE ORAL at 08:14

## 2025-01-26 RX ADMIN — IPRATROPIUM BROMIDE AND ALBUTEROL SULFATE 1 DOSE: 2.5; .5 SOLUTION RESPIRATORY (INHALATION) at 16:30

## 2025-01-26 RX ADMIN — IPRATROPIUM BROMIDE AND ALBUTEROL SULFATE 1 DOSE: 2.5; .5 SOLUTION RESPIRATORY (INHALATION) at 19:33

## 2025-01-26 RX ADMIN — IPRATROPIUM BROMIDE AND ALBUTEROL SULFATE 1 DOSE: 2.5; .5 SOLUTION RESPIRATORY (INHALATION) at 11:50

## 2025-01-26 RX ADMIN — ATORVASTATIN CALCIUM 40 MG: 40 TABLET, FILM COATED ORAL at 21:32

## 2025-01-26 RX ADMIN — SENNOSIDES 17.2 MG: 8.6 TABLET, FILM COATED ORAL at 09:35

## 2025-01-26 RX ADMIN — VANCOMYCIN HYDROCHLORIDE 750 MG: 750 INJECTION, POWDER, LYOPHILIZED, FOR SOLUTION INTRAVENOUS at 02:56

## 2025-01-26 RX ADMIN — MIDODRINE HYDROCHLORIDE 5 MG: 5 TABLET ORAL at 17:43

## 2025-01-26 RX ADMIN — BUDESONIDE AND FORMOTEROL FUMARATE DIHYDRATE 2 PUFF: 160; 4.5 AEROSOL RESPIRATORY (INHALATION) at 19:34

## 2025-01-26 RX ADMIN — SODIUM CHLORIDE, PRESERVATIVE FREE 10 ML: 5 INJECTION INTRAVENOUS at 21:35

## 2025-01-26 RX ADMIN — Medication 75 MCG/HR: at 10:07

## 2025-01-26 RX ADMIN — IPRATROPIUM BROMIDE AND ALBUTEROL SULFATE 1 DOSE: 2.5; .5 SOLUTION RESPIRATORY (INHALATION) at 03:36

## 2025-01-26 RX ADMIN — WATER 60 MG: 1 INJECTION INTRAMUSCULAR; INTRAVENOUS; SUBCUTANEOUS at 21:32

## 2025-01-26 RX ADMIN — PROPOFOL 40 MCG/KG/MIN: 10 INJECTION, EMULSION INTRAVENOUS at 05:37

## 2025-01-26 RX ADMIN — SODIUM CHLORIDE, PRESERVATIVE FREE 10 ML: 5 INJECTION INTRAVENOUS at 08:15

## 2025-01-26 ASSESSMENT — PAIN SCALES - GENERAL
PAINLEVEL_OUTOF10: 0
PAINLEVEL_OUTOF10: 0

## 2025-01-26 ASSESSMENT — PULMONARY FUNCTION TESTS
PIF_VALUE: 26
PIF_VALUE: 29
PIF_VALUE: 27
PIF_VALUE: 25
PIF_VALUE: 39
PIF_VALUE: 28
PIF_VALUE: 25
PIF_VALUE: 24
PIF_VALUE: 25
PIF_VALUE: 25
PIF_VALUE: 24
PIF_VALUE: 25
PIF_VALUE: 27
PIF_VALUE: 40
PIF_VALUE: 25
PIF_VALUE: 26
PIF_VALUE: 25
PIF_VALUE: 27

## 2025-01-26 ASSESSMENT — PAIN DESCRIPTION - DESCRIPTORS: DESCRIPTORS: OTHER (COMMENT)

## 2025-01-26 ASSESSMENT — PAIN DESCRIPTION - LOCATION: LOCATION: OTHER (COMMENT)

## 2025-01-26 ASSESSMENT — PAIN DESCRIPTION - ORIENTATION: ORIENTATION: OTHER (COMMENT)

## 2025-01-27 ENCOUNTER — HOSPITAL ENCOUNTER (INPATIENT)
Age: 60
LOS: 1 days | DRG: 951 | End: 2025-01-28
Attending: INTERNAL MEDICINE | Admitting: INTERNAL MEDICINE
Payer: COMMERCIAL

## 2025-01-27 VITALS
WEIGHT: 125.44 LBS | OXYGEN SATURATION: 46 % | HEART RATE: 101 BPM | TEMPERATURE: 99.9 F | DIASTOLIC BLOOD PRESSURE: 80 MMHG | HEIGHT: 67 IN | SYSTOLIC BLOOD PRESSURE: 121 MMHG | RESPIRATION RATE: 15 BRPM | BODY MASS INDEX: 19.69 KG/M2

## 2025-01-27 LAB
ALBUMIN SERPL-MCNC: 3.4 G/DL (ref 3.4–5)
ALBUMIN/GLOB SERPL: 1.7 {RATIO} (ref 1.1–2.2)
ALP SERPL-CCNC: 60 U/L (ref 40–129)
ALT SERPL-CCNC: 36 U/L (ref 10–40)
ANION GAP SERPL CALCULATED.3IONS-SCNC: 8 MMOL/L (ref 9–17)
ARTERIAL PATENCY WRIST A: ABNORMAL
AST SERPL-CCNC: 34 U/L (ref 15–37)
ATYPICAL LYMPHOCYTE ABSOLUTE COUNT: 0.09 K/UL
ATYPICAL LYMPHOCYTES: 3 %
BASOPHILS # BLD: 0 K/UL
BASOPHILS NFR BLD: 0 % (ref 0–1)
BILIRUB DIRECT SERPL-MCNC: <0.2 MG/DL (ref 0–0.3)
BILIRUB INDIRECT SERPL-MCNC: ABNORMAL MG/DL (ref 0–0.7)
BILIRUB SERPL-MCNC: 0.2 MG/DL (ref 0–1)
BODY TEMPERATURE: 37
BUN SERPL-MCNC: 22 MG/DL (ref 7–20)
CA-I BLD-SCNC: 1.25 MMOL/L (ref 1.15–1.33)
CALCIUM SERPL-MCNC: 8.8 MG/DL (ref 8.3–10.6)
CHLORIDE SERPL-SCNC: 102 MMOL/L (ref 99–110)
CO2 SERPL-SCNC: 31 MMOL/L (ref 21–32)
COHGB MFR BLD: 0.3 % (ref 0.5–1.5)
CREAT SERPL-MCNC: 0.6 MG/DL (ref 0.6–1.2)
DATE LAST DOSE: NORMAL
EOSINOPHIL # BLD: 0 K/UL
EOSINOPHILS RELATIVE PERCENT: 0 % (ref 0–3)
ERYTHROCYTE [DISTWIDTH] IN BLOOD BY AUTOMATED COUNT: 12.5 % (ref 11.7–14.9)
GFR, ESTIMATED: >90 ML/MIN/1.73M2
GLUCOSE BLD-MCNC: 135 MG/DL (ref 74–99)
GLUCOSE SERPL-MCNC: 138 MG/DL (ref 74–99)
HCO3 VENOUS: 36.5 MMOL/L (ref 22–29)
HCT VFR BLD AUTO: 39.3 % (ref 37–47)
HGB BLD-MCNC: 11.6 G/DL (ref 12.5–16)
INR PPP: 0.9
LYMPHOCYTES NFR BLD: 0.37 K/UL
LYMPHOCYTES RELATIVE PERCENT: 12 % (ref 24–44)
MAGNESIUM SERPL-MCNC: 2.1 MG/DL (ref 1.8–2.4)
MCH RBC QN AUTO: 29.7 PG (ref 27–31)
MCHC RBC AUTO-ENTMCNC: 29.5 G/DL (ref 32–36)
MCV RBC AUTO: 100.5 FL (ref 78–100)
METHEMOGLOBIN: 0.5 % (ref 0.5–1.5)
MONOCYTES NFR BLD: 0.22 K/UL
MONOCYTES NFR BLD: 7 % (ref 0–4)
NEUTROPHILS NFR BLD: 78 % (ref 36–66)
NEUTS SEG NFR BLD: 2.42 K/UL
OXYHGB MFR BLD: 55.5 %
PCO2 VENOUS: 78.1 MM HG (ref 38–54)
PH VENOUS: 7.29 (ref 7.32–7.43)
PHOSPHATE SERPL-MCNC: 2.3 MG/DL (ref 2.5–4.9)
PLATELET # BLD AUTO: 153 K/UL (ref 140–440)
PLATELET ESTIMATE: NORMAL
PMV BLD AUTO: 12 FL (ref 7.5–11.1)
PO2 VENOUS: 28.2 MM HG (ref 23–48)
POSITIVE BASE EXCESS, VEN: 7.2 MMOL/L (ref 0–3)
POTASSIUM SERPL-SCNC: 3.8 MMOL/L (ref 3.5–5.1)
PROCALCITONIN SERPL-MCNC: 0.31 NG/ML
PROT SERPL-MCNC: 5.4 G/DL (ref 6.4–8.2)
PROTHROMBIN TIME: 12.5 SEC (ref 11.7–14.5)
RBC # BLD AUTO: 3.91 M/UL (ref 4.2–5.4)
RBC # BLD: ABNORMAL 10*6/UL
SODIUM SERPL-SCNC: 141 MMOL/L (ref 136–145)
TEXT FOR RESPIRATORY: ABNORMAL
TME LAST DOSE: NORMAL H
VANCOMYCIN DOSE: NORMAL MG
VANCOMYCIN SERPL-MCNC: 10.9 UG/ML (ref 10–20)
WBC # BLD: NORMAL 10*3/UL
WBC OTHER # BLD: 3.1 K/UL (ref 4–10.5)

## 2025-01-27 PROCEDURE — 2500000003 HC RX 250 WO HCPCS: Performed by: NURSE PRACTITIONER

## 2025-01-27 PROCEDURE — 82330 ASSAY OF CALCIUM: CPT

## 2025-01-27 PROCEDURE — 80053 COMPREHEN METABOLIC PANEL: CPT

## 2025-01-27 PROCEDURE — 6360000002 HC RX W HCPCS: Performed by: NURSE PRACTITIONER

## 2025-01-27 PROCEDURE — 94761 N-INVAS EAR/PLS OXIMETRY MLT: CPT

## 2025-01-27 PROCEDURE — 83735 ASSAY OF MAGNESIUM: CPT

## 2025-01-27 PROCEDURE — 6370000000 HC RX 637 (ALT 250 FOR IP): Performed by: STUDENT IN AN ORGANIZED HEALTH CARE EDUCATION/TRAINING PROGRAM

## 2025-01-27 PROCEDURE — 94003 VENT MGMT INPAT SUBQ DAY: CPT

## 2025-01-27 PROCEDURE — 84100 ASSAY OF PHOSPHORUS: CPT

## 2025-01-27 PROCEDURE — 89220 SPUTUM SPECIMEN COLLECTION: CPT

## 2025-01-27 PROCEDURE — 82805 BLOOD GASES W/O2 SATURATION: CPT

## 2025-01-27 PROCEDURE — 2700000000 HC OXYGEN THERAPY PER DAY

## 2025-01-27 PROCEDURE — 6360000002 HC RX W HCPCS: Performed by: STUDENT IN AN ORGANIZED HEALTH CARE EDUCATION/TRAINING PROGRAM

## 2025-01-27 PROCEDURE — 6360000002 HC RX W HCPCS: Performed by: PHYSICIAN ASSISTANT

## 2025-01-27 PROCEDURE — 1250000000 HC SEMI PRIVATE HOSPICE R&B

## 2025-01-27 PROCEDURE — 85610 PROTHROMBIN TIME: CPT

## 2025-01-27 PROCEDURE — 2580000003 HC RX 258: Performed by: STUDENT IN AN ORGANIZED HEALTH CARE EDUCATION/TRAINING PROGRAM

## 2025-01-27 PROCEDURE — 94640 AIRWAY INHALATION TREATMENT: CPT

## 2025-01-27 PROCEDURE — 80202 ASSAY OF VANCOMYCIN: CPT

## 2025-01-27 PROCEDURE — 36415 COLL VENOUS BLD VENIPUNCTURE: CPT

## 2025-01-27 PROCEDURE — 85025 COMPLETE CBC W/AUTO DIFF WBC: CPT

## 2025-01-27 PROCEDURE — 82248 BILIRUBIN DIRECT: CPT

## 2025-01-27 PROCEDURE — 82962 GLUCOSE BLOOD TEST: CPT

## 2025-01-27 PROCEDURE — 36592 COLLECT BLOOD FROM PICC: CPT

## 2025-01-27 PROCEDURE — 84145 PROCALCITONIN (PCT): CPT

## 2025-01-27 PROCEDURE — 6360000002 HC RX W HCPCS: Performed by: INTERNAL MEDICINE

## 2025-01-27 RX ORDER — MIDAZOLAM HYDROCHLORIDE 2 MG/2ML
0.5 INJECTION, SOLUTION INTRAMUSCULAR; INTRAVENOUS
Status: DISCONTINUED | OUTPATIENT
Start: 2025-01-27 | End: 2025-01-28 | Stop reason: HOSPADM

## 2025-01-27 RX ORDER — MORPHINE SULFATE 10 MG/ML
10 INJECTION, SOLUTION INTRAMUSCULAR; INTRAVENOUS ONCE
Status: COMPLETED | OUTPATIENT
Start: 2025-01-27 | End: 2025-01-27

## 2025-01-27 RX ORDER — LORAZEPAM 2 MG/ML
1 INJECTION INTRAMUSCULAR
Status: DISCONTINUED | OUTPATIENT
Start: 2025-01-27 | End: 2025-01-27 | Stop reason: HOSPADM

## 2025-01-27 RX ORDER — MORPHINE SULFATE 2 MG/ML
2 INJECTION, SOLUTION INTRAMUSCULAR; INTRAVENOUS
Status: DISCONTINUED | OUTPATIENT
Start: 2025-01-27 | End: 2025-01-27 | Stop reason: HOSPADM

## 2025-01-27 RX ORDER — MORPHINE SULFATE 2 MG/ML
2 INJECTION, SOLUTION INTRAMUSCULAR; INTRAVENOUS
Status: DISCONTINUED | OUTPATIENT
Start: 2025-01-27 | End: 2025-01-28 | Stop reason: HOSPADM

## 2025-01-27 RX ORDER — MORPHINE SULFATE 10 MG/ML
10 INJECTION, SOLUTION INTRAMUSCULAR; INTRAVENOUS
Status: COMPLETED | OUTPATIENT
Start: 2025-01-27 | End: 2025-01-27

## 2025-01-27 RX ORDER — MIDAZOLAM HYDROCHLORIDE 2 MG/2ML
2 INJECTION, SOLUTION INTRAMUSCULAR; INTRAVENOUS ONCE
Status: COMPLETED | OUTPATIENT
Start: 2025-01-27 | End: 2025-01-27

## 2025-01-27 RX ADMIN — IPRATROPIUM BROMIDE AND ALBUTEROL SULFATE 1 DOSE: 2.5; .5 SOLUTION RESPIRATORY (INHALATION) at 03:52

## 2025-01-27 RX ADMIN — MIDAZOLAM 2 MG: 1 INJECTION INTRAMUSCULAR; INTRAVENOUS at 17:59

## 2025-01-27 RX ADMIN — VANCOMYCIN HYDROCHLORIDE 750 MG: 750 INJECTION, POWDER, LYOPHILIZED, FOR SOLUTION INTRAVENOUS at 03:18

## 2025-01-27 RX ADMIN — PROPOFOL 45 MCG/KG/MIN: 10 INJECTION, EMULSION INTRAVENOUS at 06:34

## 2025-01-27 RX ADMIN — ACETAMINOPHEN 650 MG: 650 SOLUTION ORAL at 06:08

## 2025-01-27 RX ADMIN — IPRATROPIUM BROMIDE AND ALBUTEROL SULFATE 1 DOSE: 2.5; .5 SOLUTION RESPIRATORY (INHALATION) at 07:00

## 2025-01-27 RX ADMIN — MIDAZOLAM 2 MG: 1 INJECTION INTRAMUSCULAR; INTRAVENOUS at 09:44

## 2025-01-27 RX ADMIN — Medication 75 MCG/HR: at 00:13

## 2025-01-27 RX ADMIN — ACETAMINOPHEN 650 MG: 650 SOLUTION ORAL at 01:56

## 2025-01-27 RX ADMIN — PROPOFOL 45 MCG/KG/MIN: 10 INJECTION, EMULSION INTRAVENOUS at 01:58

## 2025-01-27 RX ADMIN — BUDESONIDE AND FORMOTEROL FUMARATE DIHYDRATE 2 PUFF: 160; 4.5 AEROSOL RESPIRATORY (INHALATION) at 07:00

## 2025-01-27 RX ADMIN — MORPHINE SULFATE 10 MG: 10 INJECTION INTRAVENOUS at 09:44

## 2025-01-27 RX ADMIN — MORPHINE SULFATE 10 MG: 10 INJECTION INTRAVENOUS at 17:57

## 2025-01-27 ASSESSMENT — PULMONARY FUNCTION TESTS
PIF_VALUE: 26
PIF_VALUE: 32
PIF_VALUE: 30
PIF_VALUE: 26
PIF_VALUE: 30
PIF_VALUE: 30
PIF_VALUE: 24
PIF_VALUE: 29
PIF_VALUE: 25
PIF_VALUE: 12
PIF_VALUE: 32

## 2025-01-27 ASSESSMENT — PAIN DESCRIPTION - LOCATION
LOCATION: OTHER (COMMENT)
LOCATION: OTHER (COMMENT)

## 2025-01-27 ASSESSMENT — PAIN SCALES - GENERAL
PAINLEVEL_OUTOF10: 0
PAINLEVEL_OUTOF10: 0

## 2025-01-27 ASSESSMENT — PAIN DESCRIPTION - ORIENTATION
ORIENTATION: OTHER (COMMENT)
ORIENTATION: OTHER (COMMENT)

## 2025-01-27 ASSESSMENT — PAIN DESCRIPTION - DESCRIPTORS
DESCRIPTORS: OTHER (COMMENT)
DESCRIPTORS: OTHER (COMMENT)

## 2025-01-27 NOTE — PROGRESS NOTES
Inpatient Progress Note 1/22/2025        Eloina Roca  1965  [id number]      Assessment/Plan:    Eloina Roca is a 59 y.o. female with a history of COPD HFpEF HLD who presented to [facility] 1/22/2025 with acute hypoxic and hypercapnic respiratory failure requiring intubation    Problem list  Acute hypoxic and hypercarbic respiratory failure  COPD  Influenza pneumonia  CHF HFpEF  HLD  CAD      Neuro: Intubated, precedex titrate to RASS goal 0 to -1. Prior to intaubtion no noted focal deficits per ED. Pain control with multimodal regimen adjust as needed  Cardio:  Remains on levophed, low dose, titrate to MAP > 65 mmHg. Known to have CHF HfpEF with diastolic dysfunction and potential right heart failure from COPD. Repeat echo 1/22 EF 55-60% RSVP 33 mmHg.   Resp: Acute hypoxic and hypercarbic respiratory failure with Influenza pneumonia and superimposed COPD exacerbation requring mechanical ventilatory support. Adjust vent to optimize acid base status. Continue inhalers, nebs and aggressive pulm toileting. SAT/SBT with intent to extubate when clinically appropriate  GI: NPO, NGT, TF, GI ppx  : Cr 0.7, monitor uop, monitor electrolytes and replenish as needed  Heme:  Hgb and plts, pending. DVT ppx: Lovenox  ID: CBC pending. Noted to be positive for influenza. Intuabted for COPD exacerbation, started on oseltamivir, Azithromycin and ceftriaxone for COPD exacerabation. F/u cultures and sensitivities and de-escalate as able  Endo: POC BG ISS PRN. Maintain euglycemia.   MSK: DTI prevention, PT/OT/OOBTC when clinically appropriate    Tubes/Lines/Drains:    PIV  Code Status:   Full Code     Subjective:    Patient seen and examined at bedside. No overnight events. Cont to wean mechanical vent, Plan SBT when able.       Review of Systems   Respiratory:  Positive for shortness of breath.    Neurological:  Positive for weakness.        Physical Exam:            BP (!) 74/57   Pulse (!) 102   Temp (!) 100.9 °F (38.3 
     Inpatient Progress Note 1/24/2025        Eloina Roca  1965  [id number]      Assessment/Plan:    Eloina Roca is a 59 y.o. female with a history of COPD HFpEF HLD who presented to [facility] 1/22/2025 with acute hypoxic and hypercapnic respiratory failure requiring intubation    Problem list  Acute on chronic hypoxic and hypercarbic respiratory failure  COPD exacerbation   Influenza A pneumonia  Hx CHF HFpEF  Hx HLD  Hx CAD      Neuro: Precedex for bipap compliance, wean as able. Restart home PRN valium  Cardio:  Remains on levophed, low dose, titrate to MAP > 65 mmHg. Known to have CHF HfpEF with diastolic dysfunction and potential right heart failure from COPD. Repeat echo 1/22 EF 55-60% RSVP 33 mmHg. Add midodrine.   Resp: Extubated 1/23, BIPAP HS, naps and PRN. Continue inhalers, nebs and aggressive pulm toileting.   GI: Tolerating diet. GI ppx   : Cr 0.6, k+ 5.5, repeat BMP. monitor uop, monitor electrolytes and replenish as needed  Heme:  Hgb 12.3, stable. Plt 146. DVT ppx: Lovenox  ID: Noted to be positive for influenza. Continue oseltamivir, Azithromycin and ceftriaxone for COPD exacerabation. F/u cultures and sensitivities and de-escalate as able  Endo: POC BG ISS PRN. Maintain euglycemia.   MSK: DTI prevention, PT/OT/OOBTC when clinically appropriate    Tubes/Lines/Drains:    PIV  Code Status:   Full Code     Subjective:    Patient seen and examined at bedside. Refusing bipap. Pco2 72, discussed importance of bipap compliance. Patient does wish to be intubated if respiratory status continues to decline.       Review of Systems   Respiratory:  Positive for shortness of breath.    Neurological:  Positive for weakness.        Physical Exam:            /69   Pulse 65   Temp 98.2 °F (36.8 °C) (Bladder)   Resp 22   Ht 1.702 m (5' 7\")   Wt 52.3 kg (115 lb 4.8 oz)   SpO2 95%   BMI 18.06 kg/m²     General: NAD chronically ill   Eyes: EOMI  ENT: neck supple  Cardiovascular: Regular 
     Inpatient Progress Note 1/25/2025        Eloina Roca  1965  [id number]      Assessment/Plan:    Eloina Roca is a 59 y.o. female with a history of COPD, HFpEF, HLD who presented to [facility] 1/22/2025 with acute hypoxic and hypercapnic respiratory failure requiring intubation. She was extubated 1/23 however required reintubation 1/25 due to respiratory decompensation.     Problem list  Acute on chronic hypoxic and hypercarbic respiratory failure  COPD exacerbation   Influenza A pneumonia  Hx HFpEF  Hx HLD  Hx CAD      Neuro: Transition to prop and fent infusions to maintain RASS -3 to -2 on ventilator to achieve compliance.   Cardio:  Hypertensive prior to intubation 1/25 however required reinstitution of pressor support. Titrate levo to maintain MAP > 65. TTE (1/22) w/ EF 55-60% RSVP 33 mmHg. Continue midodrine.   Resp: Intubated 1/22-23, reintubated 1/25. Continue inhalers, nebs, steroids, and abx. SpO2 > 88%.   GI: NGT placed post intubation, initiate TF.    : Renal function stable, trend chemistries daily.  Heme:  Hgb stable. Continue chemical DVT ppx.   ID: Continue oseltamivir for Flu A. Azithromycin and ceftriaxone for COPD exacerabation. F/u cultures and sensitivities and de-escalate as able  Endo: Titrate SSI to maintain -180  MSK: DTI prevention, PT/OT/OOBTC when clinically appropriate    Tubes/Lines/Drains:    PIV, NGT, ETT  Code Status:   Full Code     Subjective:    Patient seen and examined this morning in follow up. She is wearing bipap in tri pod position unable to answer questions. She is weak and she/family agreeable to reintubation.       Review of Systems   Respiratory:  Positive for shortness of breath.    Neurological:  Positive for weakness.        Physical Exam:            BP (!) 64/46   Pulse 98   Temp 99.3 °F (37.4 °C) (Bladder)   Resp 16   Ht 1.702 m (5' 7\")   Wt 57.6 kg (126 lb 15.8 oz)   SpO2 100%   BMI 19.89 kg/m²     General: NAD chronically ill   Eyes: 
     Inpatient Progress Note 1/26/2025        Eloina Roca  1965  [id number]      Assessment/Plan:    Eloina Roca is a 59 y.o. female with a history of COPD, HFpEF, HLD who presented to [facility] 1/22/2025 with acute hypoxic and hypercapnic respiratory failure requiring intubation. She was extubated 1/23 however required reintubation 1/25 due to respiratory decompensation.     Problem list  Acute on chronic hypoxic and hypercarbic respiratory failure  COPD exacerbation   Influenza A pneumonia  Hx HFpEF  Hx HLD  Hx CAD      Neuro: Sedated, continue propofol and fentanyl.  Noted issues to ventilator compliance yesterday, currently breathing comfortable on mechanical ventilation.  Liberalize RASS goal to 0 to -1 today.   Cardio:  Continues to require vasopressor support with levophed, suspect this is sedation related. Titrate levo to maintain MAP > 65. TTE (1/22) w/ EF 55-60% RSVP 33 mmHg. Continue midodrine.   Resp: Intubated 1/22-23, reintubated 1/25. ABG this morning with worsening respiratory acidosis, will increase respiratory rate on the ventilation as she is heavily sedated. Continue inhaled bronchodilators and transition from IV solumedrol to daily PO prednisone for 5 day course. SpO2 > 88%.  Discussed with family, will optimize respiratory status for 48hrs with plans to extubate with no reintubation.  Now DNR CCA.  GI: NGT with TF, no documented BM this admission, will schedule bowel regimen today  : Renal function stable, trend chemistries daily.   Heme:  Hgb stable. Continue chemical DVT ppx.   ID: WBC- 3.5, afebrile. Pneumonia panel with H Flu and Influenza A. Continue oseltamivir for Flu A. Completing courses of rocephin and vanco.   Endo: Titrate SSI to maintain -180.   MSK: DTI prevention, PT/OT/OOBTC when clinically appropriate    Tubes/Lines/Drains:    PIV, NGT, ETT  Code Status:   Full Code     Subjective:    Patient seen and examined this morning in follow up. Sedated and on mechanical 
    V2.0    [facility] Progress Note      Name:  Eloina Roca /Age/Sex: 1965  (59 y.o. female)   MRN & CSN:  [id number] & [id number] Encounter Date/Time: 2025 11:09 AM EST   Location:  -A PCP: Leny Aguillon MD     Attending:Jayden Donahue Jr., *       Hospital Day: 5    Assessment and Recommendations   Eloina Roca is a 59 y.o. female who presents with Acute respiratory failure      Plan:     Acute hypoxic and hypercarbic respiratory failure  Influenza pneumonia, suspicion for superimposed bacterial pneumonia  -Extubated and failed positive pressure ventilation and has been reintubated now on .  -Finish course of Tamiflu.  Short course of antibiotics completed  -As per critical care discussion with family and myself as well, patient's CODE STATUS is now DNR CCA.  As per family based on her previous wishes she would not like to be mechanically ventilated via tracheostomy given quality of life concerns.  As per family if she fails extubation again they plan to transition her to comfort care pressures.     COPD with exacerbation  -Likely secondary to above.  Complete short course of corticosteroids.     Chronic diastolic heart failure  -Monitor volume status closely.     CAD: Aspirin and statin  Hyperlipidemia      Diet Diet NPO   DVT Prophylaxis [x] Lovenox, []  Heparin, [] SCDs, [] Ambulation,  [] Eliquis, [] Xarelto  [] Coumadin   Code Status DNR-CCA   Disposition From: Home  Expected Disposition: To be decided  Estimated Date of Discharge: 3-4 days  Patient requires continued admission due to respiratory failure   Surrogate Decision Maker/ Paola Duran (Child)      Personally reviewed Lab Studies and Imaging       Subjective:     Chief Complaint:   Chief Complaint   Patient presents with    Shortness of Breath       Family at bedside.  Patient intubated and sedated..  Per patient's son at bedside patient communicated to them while she was off the ventilator that she would not 
    V2.0    [facility] [place]      Name:  Eloina Roca /Age/Sex: 1965  (59 y.o. female)   MRN & CSN:  [id number] & [id number] Encounter Date/Time: 2025 11:09 AM EST   Location:  -A PCP: Leny Aguillon MD     Attending:Jayden Donahue Jr., *       Hospital Day: 3    Assessment and Recommendations   Eloina Roca is a 59 y.o. female who presents with Acute respiratory failure      Plan:     Acute hypoxic and hypercarbic respiratory failure  Influenza pneumonia, suspicion for superimposed bacterial pneumonia  -Extubated but is high oxygen requirements now.  Intermittently refusing BiPAP.  Precedex has been ordered.  -Has been started on Tamiflu and broad-spectrum antibiotics with ceftriaxone azithromycin.  Follow results of the culture and narrow as able.     COPD with exacerbation  -Likely secondary to above.  Complete short course of corticosteroids.     Chronic diastolic heart failure  -Monitor volume status closely.     CAD: Aspirin and statin  Hyperlipidemia      Diet ADULT DIET; Full Liquid   DVT Prophylaxis [x] Lovenox, []  Heparin, [] SCDs, [] Ambulation,  [] Eliquis, [] Xarelto  [] Coumadin   Code Status Full Code   Disposition From: Home  Expected Disposition: To be decided  Estimated Date of Discharge: 3-4 days  Patient requires continued admission due to respiratory failure   Surrogate Decision Maker/ Paola Duran (Child)      Personally reviewed Lab Studies and Imaging       Subjective:     Chief Complaint:   Chief Complaint   Patient presents with    Shortness of Breath       Feels okay.  Reports she does not like the BiPAP mask.  Reports she is short of breath. denies any other complaints.    Review of Systems:      Pertinent positives and negatives discussed in HPI    Objective:     Intake/Output Summary (Last 24 hours) at 2025 1045  Last data filed at 2025 0905  Gross per 24 hour   Intake 624.03 ml   Output 1175 ml   Net -550.97 ml      Vitals: 
4 Eyes Skin Assessment     NAME:  Eloina Roca  YOB: 1965  MEDICAL RECORD NUMBER:  [id number]    The patient is being assessed for  Admission    I agree that at least one RN has performed a thorough Head to Toe Skin Assessment on the patient. ALL assessment sites listed below have been assessed.      Areas assessed by both nurses:    Head, Face, Ears, Shoulders, Back, Chest, Arms, Elbows, Hands, Sacrum. Buttock, Coccyx, Ischium, Legs. Feet and Heels, and Under Medical Devices         Does the Patient have a Wound? No noted wound(s)       Zachary Prevention initiated by RN: Yes  Wound Care Orders initiated by RN: No    Pressure Injury (Stage 3,4, Unstageable, DTI, NWPT, and Complex wounds) if present, place Wound referral order by RN under : No    New Ostomies, if present place, Ostomy referral order under : No     Nurse 1 eSignature: Electronically signed by Christy Velez RN on 1/22/25 at 7:01 AM EST    **SHARE this note so that the co-signing nurse can place an eSignature**    Nurse 2 eSignature: Electronically signed by Maine Méndez RN on 1/22/25 at 11:11 AM EST    
Comprehensive Nutrition Assessment    Type and Reason for Visit:  Reassess    Nutrition Recommendations/Plan:   Advance to a Regular Diet as timely as able  Begin renal oral nutrition supplement tid, between meals     Malnutrition Assessment:  Malnutrition Status:  Moderate malnutrition (01/22/25 0929)    Context:  Chronic Illness     Findings of the 6 clinical characteristics of malnutrition:  Energy Intake:  Unable to assess  Weight Loss:  Mild weight loss     Body Fat Loss:  Mild body fat loss Orbital, Triceps   Muscle Mass Loss:   (moderate) Temples (temporalis), Clavicles (pectoralis & deltoids), Thigh (quadriceps), Calf (gastrocnemius)  Fluid Accumulation:  No fluid accumulation     Strength:  Not Performed    Nutrition Assessment:    Pt extubated, refusing bipap, EN d/c, taking some liquids on a Full Liquid Diet. Will add oral supplements to optimize her intake and continue to follow as high nutrition risk.    Nutrition Related Findings:    K 5.5, BUN 34, Mg 2.6, Glu 109   Wound Type: None       Current Nutrition Intake & Therapies:    Average Meal Intake: Unable to assess  Average Supplements Intake: None Ordered  ADULT DIET; Full Liquid    Anthropometric Measures:  Height: 170.2 cm (5' 7\")  Ideal Body Weight (IBW): 135 lbs (61 kg)    Admission Body Weight: 52.3 kg (115 lb 4.8 oz)  Current Body Weight: 52.3 kg (115 lb 4.8 oz), 85.4 % IBW. Weight Source: Bed scale  Current BMI (kg/m2): 18.1  Usual Body Weight: 57.2 kg (126 lb 1.7 oz) (5/13/24)     % Weight Change (Calculated): -8.6  Weight Adjustment For: No Adjustment                 BMI Categories: Underweight (BMI less than 18.5)    Estimated Daily Nutrient Needs:  Energy Requirements Based On: Kcal/kg  Weight Used for Energy Requirements: Current  Energy (kcal/day): 8716-4944 (30-35 kcal/kg)  Weight Used for Protein Requirements: Current  Protein (g/day): 62-73 (1.2-1.4 g/kg)  Method Used for Fluid Requirements: 1 ml/kcal  Fluid (ml/day): 
Current oxygen reading 87% on 30% on the vent. Notified Heather RAMOS. States to increase oxygen to keep oxygen sat greater than 90%. Increased oxygen on the vent to 50%. Notified Lorena BRYAN.   
Extubated per protocol with CATHI Zapata at bed side. Placed on 4L 02 NC.  Pre extubation NIF - 26, RSBI 45, Vital capacity  320.  Patient tolerating extubation well. Will continue to monitor.               
Family at bedside agree with plan of care this shift, no questions at this time.  
I have personally seen and examined the patient independently. I have reviewed the patient's available data,including medical history and recent test results.  I assume responsibility for patient's care and management.  I have reviewed and discussed note as documented by AMPARO.  I agree with the physical exam findings, assessment and plan. My documented MDM is a substantive portion of the supervisory note.  Complex medical decisions required for evaluation management, reviewed during critical care rounds     Acute and chronic respiratory failure with hypoxemia hypercapnia  COPD (very severe based upon current clinical features) with acute exacerbation  Influenza A pneumonia question of concurrent bacterial infection  Hypotension (positive pressure ventilation as the etiology), necessitating low-dose pressor administration  History of diastolic heart failure  History of pulmonary hypertension (secondary to above plus COPD)  History of coronary artery disease  History of hyperlipidemia        Re-intubated 1/25/2025, continue mechanical ventilatory support, continue sedation (continuous infusions of propofol and fentanyl)  Hemodynamic support low-dose Levophed infusion  Bronchodilators, systemic steroid  Tamiflu plus antimicrobial therapy for treatment of pneumonia  Ulcer, DVT prophylaxis  Medical management hyperlipidemia  Enteral nutritional support      All pertinent laboratory data imaging reviewed        Examination female appears older than stated age, cachectic,  moderate respiratory distress, vitals reviewed.  Head normal.  Pupils are reactive motor intact.  Hearing intact opens eyes to name.  Mouth oral endotracheal tube.  Neck supple there is mild JVD.  Chest exam notable for severely reduced breath sounds throughout all fields minimal rhonchi bilaterally, moderate  use of accessory respiratory muscles.  Regular rate rhythm, subtle gallop.  Abdomen soft nontender bowel sounds are noted throughout, abdominal 
I have personally seen and examined the patient independently. I have reviewed the patient's available data,including medical history and recent test results.  I assume responsibility for patient's care and management.  I have reviewed and discussed note as documented by AMPARO.  I agree with the physical exam findings, assessment and plan. My documented MDM is a substantive portion of the supervisory note.  Complex medical decisions required for evaluation management, reviewed during critical care rounds     Acute chronic respiratory failure with hypoxemia hypercapnia  COPD (very severe based upon current evaluation) with acute exacerbation  Influenza A pneumonia question of concurrent bacterial infection  History of diastolic heart failure  History of pulmonary hypertension (secondary to above plus COPD)  History of coronary artery disease  History of hyperlipidemia        Continue intermittent noninvasive ventilatory support although more than likely based upon clinical statrus, patient will require re-intubation, invasive ventilatory support  Bronchodilators, systemic steroid  Tamiflu plus antimicrobial therapy for treatment of pneumonia  Ulcer, DVT prophylaxis  Medical management hyperlipidemia  Enteral nutritional support      All pertinent laboratory data imaging reviewed        Examination female appears older than stated age, cachectic,  moderate respiratory distress, vitals reviewed.  Head normal.  Pupils are reactive motor intact.  Hearing intact opens eyes to name.  Face mask-BiPAP. Neck supple there is mild JVD.  Chest exam notable for severely reduced breath sounds throughout all fields minimal rhonchi bilaterally, moderate  use of accessory respiratory muscles.  Regular rate rhythm, subtle gallop.  Abdomen soft nontender bowel sounds are noted throughout, abdominal paradox.  Extremities are warm to palpation intact pulses trace leg edema.  Skin exam absence of rash or eruption.Awake, alert, interactive, 
[facility] called with update regarding code status change and decision to extubation.  
Maritza PA at bedside to s/w family regarding plan/goals of care. Per family, patient would not want extraordinary efforts in place for survival. Hospice discussed. Family on board with consulting hospice, but would like to wait for more family members to be present before making any decisions.  
OG advanced by 10 cm per Brigida RAMOS. Secured at 69 at lip  
PEEP decreased from 10 to 8 per orders from Heather, NP  
PHARMACY VANCOMYCIN MONITORING SERVICE  Pharmacy consulted by Dr. Vera for monitoring and adjustment.    Indication for treatment: Bloodstream Infection  Goal trough: Trough Goal: 15-20 mcg/mL  AUC/JOSE: 400-600    Pertinent Laboratory Values:   Temp Readings from Last 3 Encounters:   01/25/25 99.1 °F (37.3 °C) (Bladder)   05/14/24 97.7 °F (36.5 °C)   05/24/23 98.1 °F (36.7 °C) (Oral)     Recent Labs     01/23/25  0440 01/24/25  0445   WBC 5.2 6.2     Recent Labs     01/23/25  0440 01/24/25  0445   BUN 48* 34*   CREATININE 1.2 0.6     Estimated Creatinine Clearance: 81 mL/min (based on SCr of 0.6 mg/dL).    Intake/Output Summary (Last 24 hours) at 1/25/2025 0355  Last data filed at 1/24/2025 2330  Gross per 24 hour   Intake 1190.56 ml   Output 1725 ml   Net -534.44 ml     Last Encounter Weight:  Wt Readings from Last 3 Encounters:   01/23/25 52.3 kg (115 lb 4.8 oz)   05/13/24 57.2 kg (126 lb 1.7 oz)   03/05/24 102.8 kg (226 lb 9.6 oz)       Pertinent Cultures:   Date    Source    Results  01/22   Blood    No growth at 2 days  01/22   RESP Panel   Influenza A  01/22   Pneumonia Panel  Influenza A, Haemophilus Influenzae  01/22   MRSA PCR   Not detected  01/22   Respiratory   Rare gram positive cocci, rare gram negative rods  01/22   Strep Pneumo   Negative  01/22   Legionella   Negative    Assessment:  HPI: 59 y.o. female with history of COPD, HFpEF, and HLD presented to the ED on 01/22 with respiratory failure requiring intubation.  Viral panels returned positive for Influenza A; There is suspicion for concurrent bacterial infection.  SCr = 0.6, BUN = 34, and good UOP  Day(s) of therapy: 1   Vancomycin concentration:   01/26 - To be collected    Plan:  Vancomycin 1,250 mg IV loading dose; Follow with Vancomycin 750 mg IV Q 12 Hours  Steady state predictions: AUC: 452, Trough 13.6  Pharmacy will continue to monitor patient and adjust therapy as indicated    VANCOMYCIN CONCENTRATION SCHEDULED FOR 01/26/2025 @ 6 
PHARMACY VANCOMYCIN MONITORING SERVICE  Pharmacy consulted by Dr. Vera for monitoring and adjustment.    Indication for treatment: Bloodstream Infection  Goal trough: Trough Goal: 15-20 mcg/mL  AUC/JOSE: 400-600    Pertinent Laboratory Values:   Temp Readings from Last 3 Encounters:   01/26/25 99.3 °F (37.4 °C) (Bladder)   05/14/24 97.7 °F (36.5 °C)   05/24/23 98.1 °F (36.7 °C) (Oral)     Recent Labs     01/24/25  0445 01/25/25  0542 01/26/25  0515   WBC 6.2 6.2 3.5*     Recent Labs     01/24/25  0445 01/25/25  0542 01/26/25  0515   BUN 34* 20 19   CREATININE 0.6 0.5* 0.6     Estimated Creatinine Clearance: 85 mL/min (based on SCr of 0.6 mg/dL).    Intake/Output Summary (Last 24 hours) at 1/26/2025 1102  Last data filed at 1/26/2025 0920  Gross per 24 hour   Intake 417.86 ml   Output 1200 ml   Net -782.14 ml     Last Encounter Weight:  Wt Readings from Last 3 Encounters:   01/26/25 54.6 kg (120 lb 5.9 oz)   05/13/24 57.2 kg (126 lb 1.7 oz)   03/05/24 102.8 kg (226 lb 9.6 oz)       Pertinent Cultures:   Date    Source    Results  01/22   Blood    No growth at 2 days  01/22   RESP Panel   Influenza A  01/22   Pneumonia Panel  Influenza A, Haemophilus Influenzae  01/22   MRSA PCR   Not detected  01/22   Respiratory   Rare gram positive cocci, rare gram negative rods  01/22   Strep Pneumo   Negative  01/22   Legionella   Negative    Assessment:  HPI: 59 y.o. female with history of COPD, HFpEF, and HLD presented to the ED on 01/22 with respiratory failure requiring intubation.  Viral panels returned positive for Influenza A; There is suspicion for concurrent bacterial infection.  SCr = 0.6, BUN = 19, and good UOP  Day(s) of therapy: 2   Vancomycin concentration:   01/26 - 20 mg/L, 2.5 hour level on vancomycin 750 mg IV q12 hours, level likely inaccurate due to being drawn close to end of infusion  Plan:  Will continue with Vancomycin 750 mg IV q12hr for now  Steady state predictions: AUC: 442, Trough 13.6  Level 
Patient is refusing to wear BiPAP at this time. She states \" she's just not ready. She needs to get herself together first\".   
Patient put on SBT, CPAP/PS of 10 per orders from Dr. Donahue. Settings in the flowsheet liset RN notified.     01/23/25 1104   Patient Observation   Pulse 79   Respirations 24   SpO2 93 %   Vent Information   Vent Mode (S)  CPAP/PS   Ventilator Settings   FiO2  45 %   PEEP/CPAP (cmH2O) (S)  5   Pressure Support (cm H2O) (S)  10 cm H2O   Vent Patient Data (Readings)   Vt (Measured) 285 mL   Peak Inspiratory Pressure (cmH2O) 16 cmH2O   Rate Measured 26 br/min   Minute Volume (L/min) 7.39 Liters   Mean Airway Pressure (cmH2O) 7.8 cmH20   Plateau Pressure (cm H2O) 0 cm H2O   Driving Pressure -5   I:E Ratio 1:3.00   Backup Apnea On   Backup Rate 20 Breaths Per Minute   Backup Vt 450   Vent Alarm Settings   High Pressure (cmH2O) 40 cmH2O   Low Minute Volume (lpm) 2.5 L/min   High Minute Volume (lpm) 20 L/min   Low Exhaled Vt (ml) 0.29 mL   High Exhaled Vt (ml) 1000 mL   RR High (bpm) 40 br/min   Apnea (secs) 20 secs   Additional Respiratoray Assessments   Humidification Source HME   Ambu Bag With Mask At Bedside Yes   Ventilator Associated Pneumonia Bundle   Oral Care Performed Mouth swabbed       
Patients work of breathing started increasing dramatically on bipap. Oxygen saturations staying in the mid 80's. Tripod stance in bed. Respiratory and ICU team made aware. Intubation took place at 1108. 60mg Rocuronium and 20mg Etomidate used. Tolerated well but BP slight drop. Levo started at 5 mcg per order. ETT size 7 measured 21 at lip. OG placed 16 Fr, 59 at lip.     [facility] called and updated at 1204  
Per [facility], OK to extubate. 1x orders placed for Versed and Morphine to be given PRIOR to extubation. See MAR. RT Vishal called and informed of plan.  
Per discussion with family, \"no heroics\", once extubated (within the next 48 hours), no re-intubation and transition to comfort care measures if she is failing. DNR CCA for current time.    E Godfrey  [phone]  
Pt NG increased to 65 cm per Dr. Vera at bedside after reading NG tube x-ray. States no need for further x-ray to be completed.   
Pt cleared to extubate. Extubated at this time with this RN, Lorraine RN and Mello Rt. Pt placed on 4L O2 (baseline at home) and tolerating well. O2 sat 94%. Pt talking and weak cough. Will continue to monitor.   
Pt extubated at 1000 by RT Vishal. Family at bedside and updated.    Per family, have already chosen a  home- [place].    Telemetry monitor placed in comfort care mode.    Continuous IV gtts stopped.  
Spiritual Health History and Assessment/Progress Note  [facility]    Spiritual/Emotional Needs,  ,  ,      Name: Eloina Roca MRN: [id number]    Age: 59 y.o.     Sex: female   Language: English   Yazidism: Patient Refused   Acute respiratory failure     Date: 1/22/2025            Total Time Calculated: 4 min              Spiritual Assessment began in [facility] ICU        Referral/Consult From: Rounding   Encounter Overview/Reason: Spiritual/Emotional Needs  Service Provided For: Patient and family together    Maria Luisa, Belief, Meaning:   Patient unable to assess at this time  Family/Friends Other: unable to assess      Importance and Influence:  Patient unable to assess at this time  Family/Friends Other: unable to assess    Community:  Patient Other: PT seems to have family support but was not alert at time of encounter,. Grandson was in room with PT.  Family/Friends feel well-supported. Support system includes: Extended family    Assessment and Plan of Care:     Patient Interventions include:   Family/Friends Interventions include: Facilitated expression of thoughts and feelings    Patient Plan of Care: Spiritual Care available upon further referral  Family/Friends Plan of Care: Spiritual Care available upon further referral    Electronically signed by Chaplain Santos on 1/22/2025 at 4:12 PM   
breath. denies any other complaints.    Review of Systems:      Pertinent positives and negatives discussed in HPI    Objective:     Intake/Output Summary (Last 24 hours) at 1/25/2025 1025  Last data filed at 1/25/2025 1015  Gross per 24 hour   Intake 831.03 ml   Output 1575 ml   Net -743.97 ml      Vitals:   Vitals:    01/25/25 0702 01/25/25 0800 01/25/25 0900 01/25/25 1000   BP: (!) 141/66 (!) 142/65 (!) 109/56 (!) 155/80   Pulse: 90 86 (!) 105 (!) 115   Resp: 29 (!) 31 19 24   Temp:  99.3 °F (37.4 °C)     TempSrc:  Bladder     SpO2: 94%      Weight:       Height:             Physical Exam:      General: NAD  Eyes: EOMI  ENT: neck supple  Cardiovascular: Regular rate.  Respiratory: Bilateral rhonchi.  Short of breath unable to complete full sentences  Gastrointestinal: Soft, non tender  Genitourinary: no suprapubic tenderness  Musculoskeletal: No edema  Skin: warm, dry  Neuro: Alert.  Oriented to time place person.  Strength 5 out of 5 in bilateral upper and lower extremities.  Psych: Mood appropriate.         Medications:   Medications:    vancomycin  750 mg IntraVENous Q12H    midodrine  5 mg Oral TID WC    oseltamivir  75 mg Oral BID    mupirocin   Each Nostril BID    And    chlorhexidine gluconate   Topical Daily    predniSONE  40 mg Oral Daily    sodium chloride flush  5-40 mL IntraVENous 2 times per day    enoxaparin  40 mg SubCUTAneous Daily    ipratropium 0.5 mg-albuterol 2.5 mg  1 Dose Inhalation Q4H RT    budesonide-formoterol  2 puff Inhalation BID RT    aspirin  81 mg Oral Daily    CENTRUM/CERTA-CAITLYN with minerals oral  15 mL Oral Daily    thiamine  100 mg Oral Daily    zinc sulfate  50 mg Oral Daily    folic acid  1 mg Oral Daily    cefTRIAXone (ROCEPHIN) IV  2,000 mg IntraVENous Q24H    atorvastatin  40 mg Oral Nightly    acetaminophen  650 mg Oral 4 times per day      Infusions:    norepinephrine Stopped (01/24/25 0846)    dexmedeTOMIDine 0.4 mcg/kg/hr (01/25/25 1014)    sodium chloride       PRN 
Imaging   Echo (TTE) complete (PRN contrast/bubble/strain/3D)    Result Date: 1/22/2025    Left Ventricle: The EF by visual approximation is 55 - 60%. Normal wall motion.   Right Ventricle: Right ventricle is mildly dilated. Reduced systolic function. TAPSE is 1.4 cm.   Aortic Valve: Sclerosis of the aortic valve cusps.   Tricuspid Valve: Mild regurgitation. The estimated RVSP is 33 mmHg.   Pericardium: No pericardial effusion.   Image quality is adequate. Procedure performed with the patient in a supine position.     XR ABDOMEN FOR NG/OG/NE TUBE PLACEMENT    Result Date: 1/22/2025  PROCEDURE: XR ABDOMEN FOR NG/OG/NE TUBE PLACEMENT DATE OF EXAM:  1/22/2025 4:51 DEMOGRAPHICS: 59 years old Female INDICATION: Confirmation of course of NG/OG/NE tube and location of tip of tube COMPARISON: No existing relevant imaging study corresponding to the same anatomical region is available. TECHNIQUE: A single AP supine view of the abdomen was obtained. FINDINGS: Enteric tube tip projects over the expected region of the GE junction.  Side hole port projects over the lower mediastinum. IMPRESSION: 1.  Enteric tube tip at the expected region of the GE junction. Advancement of at least 13 cm recommended if clinically warranted. This dictation was created with voice recognition software.  While attempts have  been made to review the dictation as it is transcribed, on occasion the spoken word can be misinterpreted by the technology leading to omissions or inappropriate words, phrases or sentences.  Dictated and Electronically Signed By: Antonio Andres DO 1/22/2025 4:23        XR CHEST PORTABLE    Result Date: 1/22/2025  EXAMINATION: XR CHEST PORTABLE DATE OF EXAM:  1/22/2025 4:19 DEMOGRAPHICS: 59 years old Female INDICATION: Postintubation COMPARISON: Chest x-ray 1/22/2025 TECHNIQUE: Single AP portable chest radiograph was obtained. FINDINGS: ET tube tip is approximately 7.7 cm above the jovan. Lungs are hyperinflated and hyperlucent.

## 2025-01-27 NOTE — ACP (ADVANCE CARE PLANNING)
Patient's family decided to change the code status to DNR CC, and proceed with compassionate extubation.  Code status updated, hospice consult placed. Orders for terminal extubation along with comfort meds placed.

## 2025-01-27 NOTE — DISCHARGE SUMMARY
Electronically Signed By: Antonio Andres DO 1/22/2025 4:21        XR CHEST PORTABLE    Result Date: 1/22/2025  EXAMINATION: XR CHEST PORTABLE DATE OF EXAM:  1/22/2025 1:40 DEMOGRAPHICS: 59 years old Female INDICATION: sob COMPARISON: Chest x-ray 5/13/2024 TECHNIQUE: Single AP portable chest radiograph was obtained. FINDINGS: Lungs are hyperinflated and hyperlucent. No consolidation, pleural effusion or pneumothorax. Cardiac silhouette and vascular pedicle are unremarkable. Trachea is midline. Osseous structures and soft tissues appear grossly intact.Thoracic aorta is atherosclerotic. IMPRESSION: 1.  Nonacute chest. 2.  Pulmonary emphysema. This dictation was created with voice recognition software.  While attempts have  been made to review the dictation as it is transcribed, on occasion the spoken word can be misinterpreted by the technology leading to omissions or inappropriate words, phrases or sentences.  Dictated and Electronically Signed By: Antonio Andres DO 1/22/2025 0:52          CBC:   Recent Labs     01/25/25  0542 01/26/25  0515 01/27/25  0610   WBC 6.2 3.5* 3.1*   HGB 11.9* 11.9* 11.6*   * 131* 153     BMP:    Recent Labs     01/25/25  0542 01/26/25  0515 01/27/25  0610   * 143 141   K 4.6 4.2 3.8    102 102   CO2 38* 35* 31   BUN 20 19 22*   CREATININE 0.5* 0.6 0.6   GLUCOSE 87 160* 138*     Hepatic:   Recent Labs     01/25/25  0542 01/26/25  0515 01/27/25  0610   AST 92* 53* 34   ALT 53* 42* 36   BILITOT <0.2 <0.2 0.2   ALKPHOS 73 68 60     Lipids:   Lab Results   Component Value Date/Time    CHOL 195 01/23/2025 04:40 AM    HDL 77 01/23/2025 04:40 AM    TRIG 90 01/23/2025 04:40 AM     Hemoglobin A1C:   Lab Results   Component Value Date/Time    LABA1C 5.9 01/23/2025 04:40 AM     TSH:   Lab Results   Component Value Date/Time    TSH 0.69 01/23/2025 04:40 AM     Troponin:   Lab Results   Component Value Date/Time    TROPONINT <0.010 05/15/2023 12:37 PM    TROPONINT 0.011 12/28/2021

## 2025-01-27 NOTE — PROGRESS NOTES
RN continues to round on patient and patient family. Turns/repositioning have been offered, but patient's family refuses- stating that patient's O2 saturation drops when she is turned or \"moved\" in bed.    RN has also offered to reassess needs for PRN medications for anxiety/pain. Pt family states that she is not currently in need of any medications and will let the nurse know if/when she needs them.

## 2025-01-27 NOTE — PROGRESS NOTES
RN attempted to perform mouth care for comfort. Pt family refused- telling RN to \"get off of her.\"    RN assessed patient need for PRN medication. Pt shaking her head \"no\" when asked.

## 2025-01-28 VITALS — OXYGEN SATURATION: 46 % | DIASTOLIC BLOOD PRESSURE: 39 MMHG | SYSTOLIC BLOOD PRESSURE: 82 MMHG | TEMPERATURE: 99.5 F

## 2025-01-28 PROCEDURE — 6360000002 HC RX W HCPCS: Performed by: PHYSICIAN ASSISTANT

## 2025-01-28 RX ADMIN — MORPHINE SULFATE 2 MG: 2 INJECTION, SOLUTION INTRAMUSCULAR; INTRAVENOUS at 00:55

## 2025-01-28 ASSESSMENT — PAIN SCALES - GENERAL: PAINLEVEL_OUTOF10: 0

## 2025-01-28 NOTE — PROGRESS NOTES
[facility] notified, would like to hold off on releasing the body to the  home as the patient is a potential corneal donor. Will maris sánchez RN know

## 2025-01-28 NOTE — H&P
Admission History and Physical       Name:  Eloina Roca /Age/Sex: 1965 (59 y.o. female)   Admit Date: 2025  Discharge Date: 25    MRN & CSN:  [id number] & [id number] Encounter Date and Time 25 9:17 AM EST    Attending:  Ana Vera MD Discharging Provider: Shameka Henriquez PA-C         Hospital Course:      Brief HPI: Eloina Roca is a 59 y.o. female with significant PMH of COPD, HFpEF, HLD, who presented to [facility] on 2025 with progressively worsening SOB for 2 days prior to admission with worsening nonproductive cough, no fevers chills nausea vomiting dizziness or lightheadedness chest pain abdominal pain nausea UTI changes.  Patient was intubated for airway protection in the ER, admitted under critical care service and was transferred to ICU.  Venous blood gas-significant for hypercapnia-pCO2 80, pneumonia PCR panel-positive for influenza A, haemophilus influenza respiratory culture positive for gram-positive cocci and gram-positive rods.  Started on broad-spectrum antibiotic coverage and Tamiflu, with aggressive pulmonary toileting, patient was extubated on , reintubated on  for worsening respiratory acidosis.  Goals of care discussion with the family on , family decided to wait for 48 hours with plan to extubate and no reintubation, CODE STATUS changed to DNR CCA.  This morning on , patient's son change the CODE STATUS to DNR comfort care, he did not want her to suffer any longer, rest of the family agreed as the patient never wanted to be on machines, patient was compassionately extubated after changing the CODE STATUS to DNR COMFORT CARE at 1000 hrs. Comfort care only medications ordered.  With extubation, pt maintained wakefulness and death does not appear imminent.   Hospice consulted,    Discharged to hospice.        Brief Problem Based Course:      Hospice care at expected end of life  Acute on chronic hypoxic and hypercarbic respiratory failure  COPD

## 2025-01-28 NOTE — PROGRESS NOTES
This RN went into the room to offer oral care as well as to offer to turn the patient. The family stated, \"we will do that, if we need you, we will get you\". Charting family refusal for turning as well as oral care.

## 2025-01-28 NOTE — PLAN OF CARE
Problem: Discharge Planning  Goal: Discharge to home or other facility with appropriate resources  1/28/2025 0453 by Maci Rivas RN  Outcome: Progressing  1/28/2025 0453 by Maci Rivas RN  Outcome: Progressing     Problem: Safety - Adult  Goal: Free from fall injury  1/28/2025 0453 by Maci Rivas RN  Outcome: Progressing  1/28/2025 0453 by Maci Rivas RN  Outcome: Progressing     Problem: Skin/Tissue Integrity  Goal: Skin integrity remains intact  Description: 1.  Monitor for areas of redness and/or skin breakdown  2.  Assess vascular access sites hourly  3.  Every 4-6 hours minimum:  Change oxygen saturation probe site  4.  Every 4-6 hours:  If on nasal continuous positive airway pressure, respiratory therapy assess nares and determine need for appliance change or resting period  1/28/2025 0453 by Maci Rivas RN  Outcome: Progressing  1/28/2025 0453 by Maci Rivas RN  Outcome: Progressing     Problem: Infection - Adult  Goal: Absence of infection at discharge  1/28/2025 0453 by Maci Rivas RN  Outcome: Progressing  1/28/2025 0453 by Maci Rivas RN  Reactivated  Goal: Absence of infection during hospitalization  1/28/2025 0453 by Maci Rivas RN  Outcome: Progressing  1/28/2025 0453 by Maci Rivas RN  Reactivated

## 2025-01-28 NOTE — DISCHARGE SUMMARY
Discharge Summary    Name:  Eloina Roca /Age/Sex: 1965  (59 y.o. female)   MRN & CSN:  [id number] & [id number] Admission Date/Time: 2025 11:47 AM   Attending:  Eboni Santana DO Discharging Physician: Ana Vera MD     Hospital Course:   Eloina Roca is a 59 y.o. female with significant PMH of COPD, HFpEF, HLD, who presented to [facility] on 2025 with progressively worsening SOB for 2 days prior to admission with worsening nonproductive cough, no fevers chills nausea vomiting dizziness or lightheadedness chest pain abdominal pain nausea UTI changes.  Patient was intubated for airway protection in the ER, admitted under critical care service and was transferred to ICU.  Venous blood gas-significant for hypercapnia-pCO2 80, pneumonia PCR panel-positive for influenza A, haemophilus influenza respiratory culture positive for gram-positive cocci and gram-positive rods.  Started on broad-spectrum antibiotic coverage and Tamiflu, with aggressive pulmonary toileting, patient was extubated on , reintubated on  for worsening respiratory acidosis.  Goals of care discussion with the family on , family decided to wait for 48 hours with plan to extubate and no reintubation, CODE STATUS changed to DNR CCA.  This morning on , patient's son change the CODE STATUS to DNR comfort care, he did not want her to suffer any longer, rest of the family agreed as the patient never wanted to be on machines, patient was compassionately extubated after changing the CODE STATUS to DNR COMFORT CARE at 1000 hrs. Comfort care only medications ordered. Hospice consulted. Patient terminally extubated.    Patient was deemed appropriate for inpatient hospice     Hospice diagnosis: Acute on chronic respiratory failure,     Patient passed on 2025 at 0602 AM.         Brief Problem Based Course:      Hospice care at expected end of life  Acute on chronic hypoxic and hypercarbic respiratory failure  COPD exacerbation

## 2025-01-29 LAB
MICROORGANISM SPEC CULT: NORMAL
MICROORGANISM SPEC CULT: NORMAL
SERVICE CMNT-IMP: NORMAL
SERVICE CMNT-IMP: NORMAL
SPECIMEN DESCRIPTION: NORMAL
SPECIMEN DESCRIPTION: NORMAL